# Patient Record
Sex: MALE | Race: BLACK OR AFRICAN AMERICAN | Employment: OTHER | URBAN - METROPOLITAN AREA
[De-identification: names, ages, dates, MRNs, and addresses within clinical notes are randomized per-mention and may not be internally consistent; named-entity substitution may affect disease eponyms.]

---

## 2019-06-08 ENCOUNTER — HOME HEALTH ADMISSION (OUTPATIENT)
Dept: HOME HEALTH SERVICES | Facility: HOME HEALTH | Age: 69
End: 2019-06-08

## 2019-06-08 ENCOUNTER — HOSPITAL ENCOUNTER (EMERGENCY)
Age: 69
Discharge: HOME | DRG: 629 | End: 2019-06-08
Attending: EMERGENCY MEDICINE
Payer: MEDICARE

## 2019-06-08 ENCOUNTER — APPOINTMENT (OUTPATIENT)
Dept: GENERAL RADIOLOGY | Age: 69
DRG: 629 | End: 2019-06-08
Payer: MEDICARE

## 2019-06-08 VITALS
TEMPERATURE: 98 F | HEART RATE: 66 BPM | OXYGEN SATURATION: 99 % | DIASTOLIC BLOOD PRESSURE: 72 MMHG | SYSTOLIC BLOOD PRESSURE: 152 MMHG | RESPIRATION RATE: 16 BRPM

## 2019-06-08 DIAGNOSIS — E11.621 DIABETIC ULCER OF RIGHT HEEL ASSOCIATED WITH TYPE 2 DIABETES MELLITUS, UNSPECIFIED ULCER STAGE (HCC): Primary | ICD-10-CM

## 2019-06-08 DIAGNOSIS — L97.419 DIABETIC ULCER OF RIGHT HEEL ASSOCIATED WITH TYPE 2 DIABETES MELLITUS, UNSPECIFIED ULCER STAGE (HCC): Primary | ICD-10-CM

## 2019-06-08 LAB
ANION GAP SERPL CALC-SCNC: 6 MMOL/L (ref 5–15)
BASOPHILS # BLD: 0 K/UL (ref 0–0.1)
BASOPHILS NFR BLD: 1 % (ref 0–1)
BUN SERPL-MCNC: 22 MG/DL (ref 6–20)
BUN/CREAT SERPL: 17 (ref 12–20)
CALCIUM SERPL-MCNC: 9 MG/DL (ref 8.5–10.1)
CHLORIDE SERPL-SCNC: 105 MMOL/L (ref 97–108)
CO2 SERPL-SCNC: 28 MMOL/L (ref 21–32)
CREAT SERPL-MCNC: 1.28 MG/DL (ref 0.7–1.3)
DIFFERENTIAL METHOD BLD: ABNORMAL
EOSINOPHIL # BLD: 0.3 K/UL (ref 0–0.4)
EOSINOPHIL NFR BLD: 5 % (ref 0–7)
ERYTHROCYTE [DISTWIDTH] IN BLOOD BY AUTOMATED COUNT: 12.7 % (ref 11.5–14.5)
GLUCOSE SERPL-MCNC: 133 MG/DL (ref 65–100)
HCT VFR BLD AUTO: 34 % (ref 36.6–50.3)
HGB BLD-MCNC: 10.7 G/DL (ref 12.1–17)
IMM GRANULOCYTES # BLD AUTO: 0 K/UL (ref 0–0.04)
IMM GRANULOCYTES NFR BLD AUTO: 0 % (ref 0–0.5)
LACTATE SERPL-SCNC: 0.8 MMOL/L (ref 0.4–2)
LYMPHOCYTES # BLD: 1 K/UL (ref 0.8–3.5)
LYMPHOCYTES NFR BLD: 17 % (ref 12–49)
MCH RBC QN AUTO: 28.5 PG (ref 26–34)
MCHC RBC AUTO-ENTMCNC: 31.5 G/DL (ref 30–36.5)
MCV RBC AUTO: 90.4 FL (ref 80–99)
MONOCYTES # BLD: 0.8 K/UL (ref 0–1)
MONOCYTES NFR BLD: 13 % (ref 5–13)
NEUTS SEG # BLD: 3.9 K/UL (ref 1.8–8)
NEUTS SEG NFR BLD: 64 % (ref 32–75)
NRBC # BLD: 0 K/UL (ref 0–0.01)
NRBC BLD-RTO: 0 PER 100 WBC
PLATELET # BLD AUTO: 354 K/UL (ref 150–400)
PMV BLD AUTO: 10 FL (ref 8.9–12.9)
POTASSIUM SERPL-SCNC: 4.3 MMOL/L (ref 3.5–5.1)
RBC # BLD AUTO: 3.76 M/UL (ref 4.1–5.7)
SODIUM SERPL-SCNC: 139 MMOL/L (ref 136–145)
WBC # BLD AUTO: 6.1 K/UL (ref 4.1–11.1)

## 2019-06-08 PROCEDURE — 87077 CULTURE AEROBIC IDENTIFY: CPT

## 2019-06-08 PROCEDURE — 99282 EMERGENCY DEPT VISIT SF MDM: CPT

## 2019-06-08 PROCEDURE — 80048 BASIC METABOLIC PNL TOTAL CA: CPT

## 2019-06-08 PROCEDURE — 83605 ASSAY OF LACTIC ACID: CPT

## 2019-06-08 PROCEDURE — 73630 X-RAY EXAM OF FOOT: CPT

## 2019-06-08 PROCEDURE — 85025 COMPLETE CBC W/AUTO DIFF WBC: CPT

## 2019-06-08 PROCEDURE — 87205 SMEAR GRAM STAIN: CPT

## 2019-06-08 PROCEDURE — 87147 CULTURE TYPE IMMUNOLOGIC: CPT

## 2019-06-08 PROCEDURE — 87186 SC STD MICRODIL/AGAR DIL: CPT

## 2019-06-08 PROCEDURE — 36415 COLL VENOUS BLD VENIPUNCTURE: CPT

## 2019-06-08 RX ORDER — METFORMIN HYDROCHLORIDE 1000 MG/1
1000 TABLET ORAL DAILY
COMMUNITY

## 2019-06-08 RX ORDER — SODIUM CHLORIDE 0.9 % (FLUSH) 0.9 %
5-40 SYRINGE (ML) INJECTION EVERY 8 HOURS
Status: DISCONTINUED | OUTPATIENT
Start: 2019-06-08 | End: 2019-06-08 | Stop reason: HOSPADM

## 2019-06-08 RX ORDER — SODIUM CHLORIDE 0.9 % (FLUSH) 0.9 %
5-40 SYRINGE (ML) INJECTION AS NEEDED
Status: DISCONTINUED | OUTPATIENT
Start: 2019-06-08 | End: 2019-06-08 | Stop reason: HOSPADM

## 2019-06-08 NOTE — ED NOTES
MD Trent Ingram have reviewed discharge instructions with the patient and caregiver. The patient and caregiver verbalized understanding. Pt will leave via w/c with caregiver.

## 2019-06-08 NOTE — ED NOTES
Bedside shift change report given to [address] (oncoming nurse) by Flor Chapin RN (offgoing nurse). Report included the following information SBAR, Kardex, ED Summary and MAR     Pt resting in bed, Ankit WEIR RN compelled wound care; wet to dry dressing.

## 2019-06-08 NOTE — DISCHARGE INSTRUCTIONS
Patient Education        Patient Education        Diabetic Foot Ulcer: Care Instructions  Your Care Instructions  Diabetes can damage the nerve endings and blood vessels in your feet. That means you are less likely to notice when your feet are injured. A small skin problem like a callus, blister, or cracked skin can turn into a larger sore, called a foot ulcer. Foot ulcers form most often on the pad (ball) of the foot or the bottom of the big toe. You can also get them on the top and bottom of each toe. Foot ulcers can get infected. If the infection is severe, then tissue in the foot can die. This is called gangrene. In that case, one or more of the toes, part or all of the foot, and sometimes part of the leg may have to be removed (amputated). Your doctor may have removed the dead tissue and cleaned the ulcer. Your foot wound may be wrapped in a protective bandage. It is very important to keep your weight off your injured foot. After a foot ulcer has formed, it will not heal as long as you keep putting weight on the area. Always get early treatment for foot problems. A minor irritation can lead to a major problem if it's not taken care of soon. Follow-up care is a key part of your treatment and safety. Be sure to make and go to all appointments, and call your doctor if you are having problems. It's also a good idea to know your test results and keep a list of the medicines you take. How can you care for yourself at home? · Follow your doctor's instructions about keeping pressure off the foot ulcer. You may need to use crutches or a wheelchair. Or you may wear a cast or a walking boot. · Follow your doctor's instructions on how to clean the ulcer and change the bandage. · If your doctor prescribed antibiotics, take them as directed. Do not stop taking them just because you feel better. You need to take the full course of antibiotics.   To prevent foot ulcers  · Keep your blood sugar close to normal by watching what and how much you eat. Track your blood sugar, take medicines if prescribed, and get regular exercise. · Do not smoke. Smoking affects blood flow and can make foot problems worse. If you need help quitting, talk to your doctor about stop-smoking programs and medicines. These can increase your chances of quitting for good. · Do not go barefoot. Protect your feet by wearing shoes that fit well. Choose shoes that are made of materials that are flexible and breathable, such as leather or cloth. · Inspect your feet daily for blisters, cuts, cracks, or sores. If you can't see well, use a mirror or have someone help you. · Have your doctor check your feet during each visit. If you have a foot problem, see your doctor. Do not try to treat your foot problem on your own. Home remedies or treatments that you can buy without a prescription (such as corn removers) can be harmful. When should you call for help? Call your doctor now or seek immediate medical care if:    · You have symptoms of infection, such as:  ? Increased pain, swelling, warmth, or redness. ? Red streaks leading from the area. ? Pus draining from the area. ? A fever.    Watch closely for changes in your health, and be sure to contact your doctor if:    · You have a new problem with your feet, such as:  ? A new sore or ulcer. ? A break in the skin that is not healing after several days. ? Bleeding corns or calluses. ? An ingrown toenail.     · You do not get better as expected. Where can you learn more? Go to http://tabitha-carla.info/. Enter T131 in the search box to learn more about \"Diabetic Foot Ulcer: Care Instructions. \"  Current as of: July 25, 2018  Content Version: 11.9  © 2335-1101 [employer]. Care instructions adapted under license by [employer] (which disclaims liability or warranty for this information).  If you have questions about a medical condition or this instruction, always ask your healthcare professional. [place] [address] any warranty or liability for your use of this information. Diabetes Foot Health: Care Instructions  Your Care Instructions    When you have diabetes, your feet need extra care and attention. Diabetes can damage the nerve endings and blood vessels in your feet, making you less likely to notice when your feet are injured. Diabetes also limits your body's ability to fight infection and get blood to areas that need it. If you get a minor foot injury, it could become an ulcer or a serious infection. With good foot care, you can prevent most of these problems. Caring for your feet can be quick and easy. Most of the care can be done when you are bathing or getting ready for bed. Follow-up care is a key part of your treatment and safety. Be sure to make and go to all appointments, and call your doctor if you are having problems. It's also a good idea to know your test results and keep a list of the medicines you take. How can you care for yourself at home? · Keep your blood sugar close to normal by watching what and how much you eat, monitoring blood sugar, taking medicines if prescribed, and getting regular exercise. · Do not smoke. Smoking affects blood flow and can make foot problems worse. If you need help quitting, talk to your doctor about stop-smoking programs and medicines. These can increase your chances of quitting for good. · Eat a diet that is low in fats. High fat intake can cause fat to build up in your blood vessels and decrease blood flow. · Inspect your feet daily for blisters, cuts, cracks, or sores. If you cannot see well, use a mirror or have someone help you. · Take care of your feet:  ? Wash your feet every day. Use warm (not hot) water. Check the water temperature with your wrists or other part of your body, not your feet. ? Dry your feet well. Pat them dry. Do not rub the skin on your feet too hard.  Dry well between your toes. If the skin on your feet stays moist, bacteria or a fungus can grow, which can lead to infection. ? Keep your skin soft. Use moisturizing skin cream to keep the skin on your feet soft and prevent calluses and cracks. But do not put the cream between your toes, and stop using any cream that causes a rash. ? Clean underneath your toenails carefully. Do not use a sharp object to clean underneath your toenails. Use the blunt end of a nail file or other rounded tool. ? Trim and file your toenails straight across to prevent ingrown toenails. Use a nail clipper, not scissors. Use an emery board to smooth the edges. · Change socks daily. Socks without seams are best, because seams often rub the feet. You can find socks for people with diabetes from specialty catalogs. · Look inside your shoes every day for things like gravel or torn linings, which could cause blisters or sores. · Buy shoes that fit well:  ? Look for shoes that have plenty of space around the toes. This helps prevent bunions and blisters. ? Try on shoes while wearing the kind of socks you will usually wear with the shoes. ? Avoid plastic shoes. They may rub your feet and cause blisters. Good shoes should be made of materials that are flexible and breathable, such as leather or cloth. ? Break in new shoes slowly by wearing them for no more than an hour a day for several days. Take extra time to check your feet for red areas, blisters, or other problems after you wear new shoes. · Do not go barefoot. Do not wear sandals, and do not wear shoes with very thin soles. Thin soles are easy to puncture. They also do not protect your feet from hot pavement or cold weather. · Have your doctor check your feet during each visit. If you have a foot problem, see your doctor. Do not try to treat an early foot problem at home. Home remedies or treatments that you can buy without a prescription (such as corn removers) can be harmful.   · Always get early treatment for foot problems. A minor irritation can lead to a major problem if not properly cared for early. When should you call for help? Call your doctor now or seek immediate medical care if:    · You have a foot sore, an ulcer or break in the skin that is not healing after 4 days, bleeding corns or calluses, or an ingrown toenail.     · You have blue or black areas, which can mean bruising or blood flow problems.     · You have peeling skin or tiny blisters between your toes or cracking or oozing of the skin.     · You have a fever for more than 24 hours and a foot sore.     · You have new numbness or tingling in your feet that does not go away after you move your feet or change positions.     · You have unexplained or unusual swelling of the foot or ankle.    Watch closely for changes in your health, and be sure to contact your doctor if:    · You cannot do proper foot care. Where can you learn more? Go to http://tabitha-carla.info/. Enter A739 in the search box to learn more about \"Diabetes Foot Health: Care Instructions. \"  Current as of: July 25, 2018  Content Version: 11.9  © 1383-3757 [employer]. Care instructions adapted under license by [employer] (which disclaims liability or warranty for this information). If you have questions about a medical condition or this instruction, always ask your healthcare professional. [place] [address] any warranty or liability for your use of this information.

## 2019-06-08 NOTE — ED TRIAGE NOTES
Assumed care of pt from [facility]. Pt is A&O x 4 and reports CC of wound to right foot. Pt reports he has had the wound for about a month and it has gotten larger. Pt denies pain. Pt reports he was seen at the [place] yesterday and is here for another opinion. Pt denies any other symptoms at this time. Pt placed on monitor x 2.  VSS

## 2019-06-08 NOTE — PROGRESS NOTES
Mr. Jasper Rodriguez is a 68 YOM, presented to ED for evaluation of right foot wound. CM consulted for assistance with transitional care plans/coordination. CM reviewed medical record, met with Patient and friend/emergency contact Ms. Teresa present at bedside; address/contact information verified on chart. Patient is a retired [employer] , 100% Service-Connected Disabled, receives monthly income from [employer] and skilled nursing. Patient is insured with Medicare A&B, [employer], and Veterans Benefits. Patient lives alone in his home in [place], reported continued independence with ADLs/IADLs, no DME in use at home. Patient has prior medical history of DM, has right foot diabetic ulcer, presented to [facility] ED for second opinion. Patient reported he was seen at [facility] this week, provided with prescription ointment and gauze for wound dressings, and scheduled to see Provider in [address] at [facility] on [facility] 6/10/19. As requested, CM provided additional education, resource information re: wound care services through [facility] and skilled home health care. Patient requested referral for [facility], [facility], referral sent to [facility] as requested. CM encouraged Patient to follow up in [address] as scheduled on 6/10/19, for additional orders, supplies, and treatment recommendations. Patient/friend verbalized understanding. Patient's PCP is Dr. Kirk Zimmerman at the [facility], [phone]. No additional questions/concerns reported at this time. CM provided emotional support, encouragement. CM advised Attending Physician of above assessment information. CM remains available to further assist with any additional transitional care needs as indicated.      Reason for Admission:  Evaluated/treated in ED for right foot diabetic ulcer                    RRAT Score:  5                   Plan for utilizing home health: Referral sent to [facility] for skilled nursing                       Current Advanced Directive/Advance Care Plan: Not on file    Likelihood of Readmission: Low                          Transition of Care Plan: Discharge home, referral sent to [facility]                    Care Management Interventions  PCP Verified by CM: Yes  Mode of Transport at Discharge:  Other (see comment)(family)  Transition of Care Consult (CM Consult): Home Health, Discharge Planning  Discharge Durable Medical Equipment: No  Physical Therapy Consult: No  Occupational Therapy Consult: No  Speech Therapy Consult: No  Current Support Network: Own Home  Confirm Follow Up Transport: Family  Plan discussed with Pt/Family/Caregiver: Yes  Freedom of Choice Offered: Yes  Discharge Location  Discharge Placement: Home with home health    Shirley Hall, [address] [facility]

## 2019-06-10 LAB
BACTERIA SPEC CULT: ABNORMAL
GRAM STN SPEC: ABNORMAL
GRAM STN SPEC: ABNORMAL
SERVICE CMNT-IMP: ABNORMAL

## 2019-06-10 NOTE — PROGRESS NOTES
Called patient. Verify demographics. He notes that he is taking Cipro. He states the wound is improving. He is concerned because home health has not contacted him about follow-up. Information provided to ED case management who will follow up on the home health concern.

## 2019-06-10 NOTE — ED PROVIDER NOTES
EMERGENCY DEPARTMENT HISTORY AND PHYSICAL EXAM      Date: 6/8/2019  Patient Name: Autumn Nails    History of Presenting Illness     Chief Complaint   Patient presents with    Foot Pain     pt stated he has an ulcer on his right foot that he thinks is infected . pt is diabetic        History Provided By: Patient    HPI: Autumn Nails, 68 y.o. male with history of DM presents to the ED for evaluation of a diabetic ulcer on his right heel. He is a [facility] patient and usually gets his care at the [address] [facility]. His partner noticed the ulceration today and became concerned that it is infected. Patient denies fevers or chills. Has had the foot ulceration for about one month. Sees podiatry the [address] [facility] but otherwise performs wound care himself. Has been able to walk and bear weight on the extremity. No swelling or calf pain in the leg. There are no other complaints, changes, or physical findings at this time. PCP: Other, MD Simón    No current facility-administered medications on file prior to encounter. Current Outpatient Medications on File Prior to Encounter   Medication Sig Dispense Refill    metFORMIN (GLUCOPHAGE) 1,000 mg tablet Take 1,000 mg by mouth daily. Past History     Past Medical History:  Past Medical History:   Diagnosis Date    Diabetes [facility])        Past Surgical History:  History reviewed. No pertinent surgical history. Family History:  History reviewed. No pertinent family history. Social History:  Social History     Tobacco Use    Smoking status: Never Smoker    Smokeless tobacco: Never Used   Substance Use Topics    Alcohol use: Yes     Comment: occ    Drug use: Never       Allergies:  No Known Allergies      Review of Systems   Review of Systems   Constitutional: Negative for appetite change, chills and fever. Respiratory: Negative for cough, chest tightness and shortness of breath. Cardiovascular: Negative for chest pain, palpitations and leg swelling.    Gastrointestinal: Negative for abdominal distention, abdominal pain, blood in stool, constipation, diarrhea, nausea and vomiting. Genitourinary: Negative for decreased urine volume, difficulty urinating, dysuria, flank pain, frequency and hematuria. Musculoskeletal: Negative for arthralgias, joint swelling, myalgias, neck pain and neck stiffness. Neurological: Negative for dizziness, weakness, light-headedness, numbness and headaches. Hematological: Negative for adenopathy. All other systems reviewed and are negative. Physical Exam   Physical Exam   Constitutional: He is oriented to person, place, and time. He appears well-developed and well-nourished. No distress. HENT:   Head: Atraumatic. Mouth/Throat: Oropharynx is clear and moist.   Eyes: Pupils are equal, round, and reactive to light. Conjunctivae and EOM are normal. No scleral icterus. Neck: Normal range of motion. Neck supple. No JVD present. Cardiovascular: Normal rate, regular rhythm, normal heart sounds and intact distal pulses. Pulmonary/Chest: Effort normal and breath sounds normal. He exhibits no tenderness. Abdominal: Soft. Bowel sounds are normal. He exhibits no distension. There is no tenderness. Musculoskeletal: Normal range of motion. He exhibits no edema or deformity. Diabetic ulcer on right heel. Patient's dressing removed. Ulcer appears clean with surrounding granulation tissue but without evidence of infection. No purulent drainage, no fluctuance, no erythema surrounding ulcer, not warm to touch. DP pulses intact. Neurological: He is alert and oriented to person, place, and time. No cranial nerve deficit. Skin: Skin is warm and dry. He is not diaphoretic. Nursing note and vitals reviewed.       Diagnostic Study Results     Labs -     Recent Results (from the past 50 hour(s))   CBC WITH AUTOMATED DIFF    Collection Time: 06/08/19  9:14 AM   Result Value Ref Range    WBC 6.1 4.1 - 11.1 K/uL    RBC 3.76 (L) 4.10 - 5.70 M/uL HGB 10.7 (L) 12.1 - 17.0 g/dL    HCT 34.0 (L) 36.6 - 50.3 %    MCV 90.4 80.0 - 99.0 FL    MCH 28.5 26.0 - 34.0 PG    MCHC 31.5 30.0 - 36.5 g/dL    RDW 12.7 11.5 - 14.5 %    PLATELET 949 065 - 325 K/uL    MPV 10.0 8.9 - 12.9 FL    NRBC 0.0 0  WBC    ABSOLUTE NRBC 0.00 0.00 - 0.01 K/uL    NEUTROPHILS 64 32 - 75 %    LYMPHOCYTES 17 12 - 49 %    MONOCYTES 13 5 - 13 %    EOSINOPHILS 5 0 - 7 %    BASOPHILS 1 0 - 1 %    IMMATURE GRANULOCYTES 0 0.0 - 0.5 %    ABS. NEUTROPHILS 3.9 1.8 - 8.0 K/UL    ABS. LYMPHOCYTES 1.0 0.8 - 3.5 K/UL    ABS. MONOCYTES 0.8 0.0 - 1.0 K/UL    ABS. EOSINOPHILS 0.3 0.0 - 0.4 K/UL    ABS. BASOPHILS 0.0 0.0 - 0.1 K/UL    ABS. IMM. GRANS. 0.0 0.00 - 0.04 K/UL    DF AUTOMATED     METABOLIC PANEL, BASIC    Collection Time: 06/08/19  9:14 AM   Result Value Ref Range    Sodium 139 136 - 145 mmol/L    Potassium 4.3 3.5 - 5.1 mmol/L    Chloride 105 97 - 108 mmol/L    CO2 28 21 - 32 mmol/L    Anion gap 6 5 - 15 mmol/L    Glucose 133 (H) 65 - 100 mg/dL    BUN 22 (H) 6 - 20 MG/DL    Creatinine 1.28 0.70 - 1.30 MG/DL    BUN/Creatinine ratio 17 12 - 20      GFR est AA >60 >60 ml/min/1.73m2    GFR est non-AA 55 (L) >60 ml/min/1.73m2    Calcium 9.0 8.5 - 10.1 MG/DL   CULTURE, WOUND W GRAM STAIN    Collection Time: 06/08/19  9:14 AM   Result Value Ref Range    Special Requests: NO SPECIAL REQUESTS      GRAM STAIN OCCASIONAL WBCS SEEN      GRAM STAIN 3+ GRAM NEGATIVE RODS      Culture result: MODERATE POSSIBLE ENTEROCOCCUS SPECIES (A)      Culture result: (A)       LIGHT STREPTOCOCCI, BETA HEMOLYTIC GROUP B . Penicillin and ampicillin are drugs of choice for treatment of beta-hemolytic streptococcal infections. Susceptibility testing of penicillins and beta-lactams approved by the FDA for treatment of beta-hemolytic streptococcal infections need not be performed routinely, because nonsusceptible isolates are extremely rare.  CLSI 2012    Culture result: FEW PROBABLE PSEUDOMONAS SPECIES (A)     LACTIC ACID Collection Time: 06/08/19  9:14 AM   Result Value Ref Range    Lactic acid 0.8 0.4 - 2.0 MMOL/L       Radiologic Studies -   XR FOOT RT MIN 3 V   Final Result   IMPRESSION:    1. Ulceration of the plantar aspect of the heel without evidence of   osteomyelitis. 2. Mild soft tissue swelling of the dorsum of the foot. 3. Degenerative changes. CT Results  (Last 48 hours)    None        CXR Results  (Last 48 hours)    None            Medical Decision Making   I am the first provider for this patient. I reviewed the vital signs, available nursing notes, past medical history, past surgical history, family history and social history. Vital Signs-Reviewed the patient's vital signs. Records Reviewed: Nursing Notes and Old Medical Records    Differential Diagnosis: abscess, cellulitis, gangrene, osteo    Provider Notes (Medical Decision Making):   Patient is a well-appearing 67 yo with a history of dm and pvd. He has had a diabetic ulcer on his right heal for about a month. He sees a podiatrist at the [place] but due to the slow healing process of the ulcer, patient's partner urged him to get evaluated in our ED  The exam does not suggest any evidence of infection, normal wbc, xrays do not show cellulitis. Provided reassurance to patient and partner. I also explained the nature and healing course of these type of ulcers and that a longer healing period is not unusual.  Discussed in detail prevention measures, importance of glucose control and good wound care. CC has assessed patient and we will set up home health to help him with wound care. He sees a podiatrist at the [place] and has a scheduled appt there on Monday. I have urged him to keep that appt. Pt has remained asymptomatic with normal vital signs and feels comfortable going home. I think this is reasonable as no findings today suggest a life-threatening condition. Pt was discharged and was provided written discharge instructions.   Additional verbal instructions and return precautions were given and discussed in detail. Pt was asked to return to the ED immediately for any new or concerning symptoms or if they worsen. Advised to follow-up with PMD or specialist as instructed. Pt was in agreement, endorsed understanding, and all questions were answered. ED Course:     Initial assessment performed. The patients presenting problems have been discussed, and they are in agreement with the care plan formulated and outlined with them. I have encouraged them to ask questions as they arise throughout their visit. ED Course as of Carlos 10 0059   Sat Jun 08, 2019   1058 Seen by Cc. Home health referral made. He has podiatry appt on Monday. [TZ]      ED Course User Index  [TZ] Anthony Anna MD         Disposition:  dc      Diagnosis     Clinical Impression:   1.  Diabetic ulcer of right heel associated with type 2 diabetes mellitus, unspecified ulcer stage ([facility] [address]

## 2019-06-11 ENCOUNTER — HOSPITAL ENCOUNTER (INPATIENT)
Age: 69
LOS: 10 days | Discharge: NURSING, REHAB OR LONG-TERM CARE | DRG: 629 | End: 2019-06-21
Attending: EMERGENCY MEDICINE | Admitting: INTERNAL MEDICINE
Payer: MEDICARE

## 2019-06-11 ENCOUNTER — APPOINTMENT (OUTPATIENT)
Dept: VASCULAR SURGERY | Age: 69
DRG: 629 | End: 2019-06-11
Payer: MEDICARE

## 2019-06-11 ENCOUNTER — APPOINTMENT (OUTPATIENT)
Dept: MRI IMAGING | Age: 69
DRG: 629 | End: 2019-06-11
Payer: MEDICARE

## 2019-06-11 DIAGNOSIS — M86.171 ACUTE OSTEOMYELITIS OF RIGHT FOOT (HCC): ICD-10-CM

## 2019-06-11 DIAGNOSIS — E11.621 DIABETIC ULCER OF RIGHT HEEL ASSOCIATED WITH TYPE 2 DIABETES MELLITUS, UNSPECIFIED ULCER STAGE (HCC): Primary | ICD-10-CM

## 2019-06-11 DIAGNOSIS — L97.419 DIABETIC ULCER OF RIGHT HEEL ASSOCIATED WITH TYPE 2 DIABETES MELLITUS, UNSPECIFIED ULCER STAGE (HCC): Primary | ICD-10-CM

## 2019-06-11 PROBLEM — E11.628 CONTROLLED TYPE 2 DIABETES MELLITUS WITH SKIN COMPLICATION, WITHOUT LONG-TERM CURRENT USE OF INSULIN (HCC): Chronic | Status: ACTIVE | Noted: 2019-06-11

## 2019-06-11 PROBLEM — L97.509 DIABETIC FOOT ULCER (HCC): Status: ACTIVE | Noted: 2019-06-11

## 2019-06-11 LAB
ANION GAP SERPL CALC-SCNC: 4 MMOL/L (ref 5–15)
BASOPHILS # BLD: 0 K/UL (ref 0–0.1)
BASOPHILS NFR BLD: 1 % (ref 0–1)
BUN SERPL-MCNC: 23 MG/DL (ref 6–20)
BUN/CREAT SERPL: 17 (ref 12–20)
CALCIUM SERPL-MCNC: 9.2 MG/DL (ref 8.5–10.1)
CHLORIDE SERPL-SCNC: 108 MMOL/L (ref 97–108)
CO2 SERPL-SCNC: 26 MMOL/L (ref 21–32)
CREAT SERPL-MCNC: 1.35 MG/DL (ref 0.7–1.3)
DIFFERENTIAL METHOD BLD: ABNORMAL
EOSINOPHIL # BLD: 0.3 K/UL (ref 0–0.4)
EOSINOPHIL NFR BLD: 4 % (ref 0–7)
ERYTHROCYTE [DISTWIDTH] IN BLOOD BY AUTOMATED COUNT: 12.8 % (ref 11.5–14.5)
GLUCOSE SERPL-MCNC: 84 MG/DL (ref 65–100)
HCT VFR BLD AUTO: 35.3 % (ref 36.6–50.3)
HGB BLD-MCNC: 11.2 G/DL (ref 12.1–17)
IMM GRANULOCYTES # BLD AUTO: 0 K/UL (ref 0–0.04)
IMM GRANULOCYTES NFR BLD AUTO: 0 % (ref 0–0.5)
LACTATE SERPL-SCNC: 0.7 MMOL/L (ref 0.4–2)
LEFT ABI: 0.8
LEFT ANTERIOR TIBIAL: 115 MMHG
LEFT ARM BP: 137 MMHG
LEFT POSTERIOR TIBIAL: 77 MMHG
LEFT TBI: 0.22
LEFT TOE PRESSURE: 32 MMHG
LYMPHOCYTES # BLD: 2 K/UL (ref 0.8–3.5)
LYMPHOCYTES NFR BLD: 28 % (ref 12–49)
MCH RBC QN AUTO: 28.7 PG (ref 26–34)
MCHC RBC AUTO-ENTMCNC: 31.7 G/DL (ref 30–36.5)
MCV RBC AUTO: 90.5 FL (ref 80–99)
MONOCYTES # BLD: 0.8 K/UL (ref 0–1)
MONOCYTES NFR BLD: 12 % (ref 5–13)
NEUTS SEG # BLD: 3.8 K/UL (ref 1.8–8)
NEUTS SEG NFR BLD: 55 % (ref 32–75)
NRBC # BLD: 0 K/UL (ref 0–0.01)
NRBC BLD-RTO: 0 PER 100 WBC
PLATELET # BLD AUTO: 369 K/UL (ref 150–400)
PMV BLD AUTO: 10.1 FL (ref 8.9–12.9)
POTASSIUM SERPL-SCNC: 4.2 MMOL/L (ref 3.5–5.1)
RBC # BLD AUTO: 3.9 M/UL (ref 4.1–5.7)
RIGHT ABI: 1.15
RIGHT ANTERIOR TIBIAL: 153 MMHG
RIGHT ARM BP: 143 MMHG
RIGHT POSTERIOR TIBIAL: 164 MMHG
RIGHT TBI: 0.52
RIGHT TOE PRESSURE: 74 MMHG
SODIUM SERPL-SCNC: 138 MMOL/L (ref 136–145)
WBC # BLD AUTO: 6.9 K/UL (ref 4.1–11.1)

## 2019-06-11 PROCEDURE — 96365 THER/PROPH/DIAG IV INF INIT: CPT

## 2019-06-11 PROCEDURE — 96376 TX/PRO/DX INJ SAME DRUG ADON: CPT

## 2019-06-11 PROCEDURE — 83605 ASSAY OF LACTIC ACID: CPT

## 2019-06-11 PROCEDURE — 36415 COLL VENOUS BLD VENIPUNCTURE: CPT

## 2019-06-11 PROCEDURE — 74011250637 HC RX REV CODE- 250/637: Performed by: EMERGENCY MEDICINE

## 2019-06-11 PROCEDURE — 93922 UPR/L XTREMITY ART 2 LEVELS: CPT

## 2019-06-11 PROCEDURE — 80048 BASIC METABOLIC PNL TOTAL CA: CPT

## 2019-06-11 PROCEDURE — 87040 BLOOD CULTURE FOR BACTERIA: CPT

## 2019-06-11 PROCEDURE — 73720 MRI LWR EXTREMITY W/O&W/DYE: CPT

## 2019-06-11 PROCEDURE — 99284 EMERGENCY DEPT VISIT MOD MDM: CPT

## 2019-06-11 PROCEDURE — 65270000015 HC RM PRIVATE ONCOLOGY

## 2019-06-11 PROCEDURE — 96375 TX/PRO/DX INJ NEW DRUG ADDON: CPT

## 2019-06-11 PROCEDURE — 85025 COMPLETE CBC W/AUTO DIFF WBC: CPT

## 2019-06-11 PROCEDURE — 93005 ELECTROCARDIOGRAM TRACING: CPT

## 2019-06-11 PROCEDURE — 74011000258 HC RX REV CODE- 258: Performed by: EMERGENCY MEDICINE

## 2019-06-11 PROCEDURE — 74011250636 HC RX REV CODE- 250/636: Performed by: INTERNAL MEDICINE

## 2019-06-11 PROCEDURE — 74011250636 HC RX REV CODE- 250/636: Performed by: EMERGENCY MEDICINE

## 2019-06-11 PROCEDURE — A9575 INJ GADOTERATE MEGLUMI 0.1ML: HCPCS | Performed by: EMERGENCY MEDICINE

## 2019-06-11 PROCEDURE — 99282 EMERGENCY DEPT VISIT SF MDM: CPT

## 2019-06-11 RX ORDER — GADOTERATE MEGLUMINE 376.9 MG/ML
15 INJECTION INTRAVENOUS
Status: COMPLETED | OUTPATIENT
Start: 2019-06-11 | End: 2019-06-11

## 2019-06-11 RX ORDER — SODIUM CHLORIDE 9 MG/ML
75 INJECTION, SOLUTION INTRAVENOUS CONTINUOUS
Status: DISCONTINUED | OUTPATIENT
Start: 2019-06-11 | End: 2019-06-13

## 2019-06-11 RX ORDER — INSULIN LISPRO 100 [IU]/ML
INJECTION, SOLUTION INTRAVENOUS; SUBCUTANEOUS EVERY 6 HOURS
Status: DISCONTINUED | OUTPATIENT
Start: 2019-06-12 | End: 2019-06-19

## 2019-06-11 RX ORDER — ACETAMINOPHEN 325 MG/1
650 TABLET ORAL
Status: DISCONTINUED | OUTPATIENT
Start: 2019-06-11 | End: 2019-06-12

## 2019-06-11 RX ORDER — ACETAMINOPHEN 500 MG
1000 TABLET ORAL ONCE
Status: COMPLETED | OUTPATIENT
Start: 2019-06-11 | End: 2019-06-11

## 2019-06-11 RX ORDER — SODIUM CHLORIDE 0.9 % (FLUSH) 0.9 %
5-40 SYRINGE (ML) INJECTION EVERY 8 HOURS
Status: DISCONTINUED | OUTPATIENT
Start: 2019-06-11 | End: 2019-06-21 | Stop reason: HOSPADM

## 2019-06-11 RX ORDER — ONDANSETRON 2 MG/ML
4 INJECTION INTRAMUSCULAR; INTRAVENOUS
Status: DISCONTINUED | OUTPATIENT
Start: 2019-06-11 | End: 2019-06-21 | Stop reason: HOSPADM

## 2019-06-11 RX ORDER — MAGNESIUM SULFATE 100 %
4 CRYSTALS MISCELLANEOUS AS NEEDED
Status: DISCONTINUED | OUTPATIENT
Start: 2019-06-11 | End: 2019-06-21 | Stop reason: HOSPADM

## 2019-06-11 RX ORDER — LORAZEPAM 2 MG/ML
0.5 INJECTION INTRAMUSCULAR
Status: COMPLETED | OUTPATIENT
Start: 2019-06-11 | End: 2019-06-11

## 2019-06-11 RX ORDER — ASPIRIN 81 MG/1
81 TABLET ORAL DAILY
Status: DISCONTINUED | OUTPATIENT
Start: 2019-06-12 | End: 2019-06-21 | Stop reason: HOSPADM

## 2019-06-11 RX ORDER — VANCOMYCIN/0.9 % SOD CHLORIDE 1.5G/250ML
1500 PLASTIC BAG, INJECTION (ML) INTRAVENOUS ONCE
Status: COMPLETED | OUTPATIENT
Start: 2019-06-11 | End: 2019-06-11

## 2019-06-11 RX ORDER — SODIUM CHLORIDE 0.9 % (FLUSH) 0.9 %
5-40 SYRINGE (ML) INJECTION AS NEEDED
Status: DISCONTINUED | OUTPATIENT
Start: 2019-06-11 | End: 2019-06-21 | Stop reason: HOSPADM

## 2019-06-11 RX ORDER — PEPPERMINT OIL
SPIRIT ORAL ONCE
Status: COMPLETED | OUTPATIENT
Start: 2019-06-11 | End: 2019-06-11

## 2019-06-11 RX ORDER — ENOXAPARIN SODIUM 100 MG/ML
40 INJECTION SUBCUTANEOUS EVERY 24 HOURS
Status: DISCONTINUED | OUTPATIENT
Start: 2019-06-11 | End: 2019-06-12

## 2019-06-11 RX ORDER — LORAZEPAM 2 MG/ML
1 INJECTION INTRAMUSCULAR
Status: COMPLETED | OUTPATIENT
Start: 2019-06-11 | End: 2019-06-11

## 2019-06-11 RX ADMIN — SODIUM CHLORIDE 75 ML/HR: 900 INJECTION, SOLUTION INTRAVENOUS at 23:17

## 2019-06-11 RX ADMIN — LORAZEPAM 0.5 MG: 2 INJECTION INTRAMUSCULAR; INTRAVENOUS at 18:38

## 2019-06-11 RX ADMIN — PIPERACILLIN SODIUM,TAZOBACTAM SODIUM 3.38 G: 3; .375 INJECTION, POWDER, FOR SOLUTION INTRAVENOUS at 20:21

## 2019-06-11 RX ADMIN — LORAZEPAM 0.5 MG: 2 INJECTION INTRAMUSCULAR; INTRAVENOUS at 19:05

## 2019-06-11 RX ADMIN — ENOXAPARIN SODIUM 40 MG: 40 INJECTION SUBCUTANEOUS at 23:27

## 2019-06-11 RX ADMIN — GADOTERATE MEGLUMINE 15 ML: 376.9 INJECTION INTRAVENOUS at 19:42

## 2019-06-11 RX ADMIN — SODIUM CHLORIDE 1000 ML: 900 INJECTION, SOLUTION INTRAVENOUS at 20:30

## 2019-06-11 RX ADMIN — Medication 5 ML: at 23:27

## 2019-06-11 RX ADMIN — Medication: at 20:21

## 2019-06-11 RX ADMIN — ACETAMINOPHEN 1000 MG: 500 TABLET ORAL at 20:20

## 2019-06-11 RX ADMIN — VANCOMYCIN HYDROCHLORIDE 1500 MG: 10 INJECTION, POWDER, LYOPHILIZED, FOR SOLUTION INTRAVENOUS at 21:08

## 2019-06-11 NOTE — ED NOTES
Delay in starting antibiotics so patient can go to MRI; Dr. Freddy Ni aware and okay with patient going to MRI first.

## 2019-06-11 NOTE — ED PROVIDER NOTES
EMERGENCY DEPARTMENT HISTORY AND PHYSICAL EXAM          Date: 6/11/2019  Patient Name: Maritza Velasquez    History of Presenting Illness     Chief Complaint   Patient presents with    Wound Check     Patient sent by foot doctor for chronic diabetic foot ulcer to right foot x 2 months; requests for MRI and DASIA's       History Provided By: Patient    HPI: Maritza Velasquez is a 68 y.o. male, pmhx DM, who presents ambulatory to the ED c/o foot wound x 2 months. Pt hs a rt sided foot wound and was seen Saturday and discharged home without antibiotics. He saw his podiatrist today who sent him for admission. Patient specifically denies any recent fevers, chills, nausea, vomiting, diarrhea, abd pain, CP, SOB, urinary sxs, changes in BM, or headache. His DM is a new dx as of March and his BS have been in the 110s this weekend. PCP: Simón Barr MD   Podiatry: [place]    Allergies: nkda  Social Hx: -tobacco, occasional EtOH,     There are no other complaints, changes, or physical findings at this time. Current Facility-Administered Medications   Medication Dose Route Frequency Provider Last Rate Last Dose    sodium chloride 0.9 % bolus infusion 1,000 mL  1,000 mL IntraVENous ONCE Shauna Gutierrez MD        piperacillin-tazobactam (ZOSYN) 3.375 g in 0.9% sodium chloride (MBP/ADV) 100 mL  3.375 g IntraVENous NOW Shauna Gutierrez MD        vancomycin (VANCOCIN) 1500 mg in  ml infusion  1,500 mg IntraVENous ONCE Shauna Gutierrez MD        LORazepam (ATIVAN) injection 1 mg  1 mg IntraVENous NOW Shauna Gutierrez MD        acetaminophen (TYLENOL) tablet 1,000 mg  1,000 mg Oral ONCE Shauna Gutierrez MD         Current Outpatient Medications   Medication Sig Dispense Refill    metFORMIN (GLUCOPHAGE) 1,000 mg tablet Take 1,000 mg by mouth daily.          Past History     Past Medical History:  Past Medical History:   Diagnosis Date    Diabetes [facility])        Past Surgical History:  No past surgical history on file. Family History:  No family history on file. Social History:  Social History     Tobacco Use    Smoking status: Never Smoker    Smokeless tobacco: Never Used   Substance Use Topics    Alcohol use: Yes     Comment: occ    Drug use: Never       Allergies:  No Known Allergies      Review of Systems   Review of Systems   Constitutional: Negative for activity change, appetite change, chills, fever and unexpected weight change. HENT: Negative for congestion. Eyes: Negative for pain and visual disturbance. Respiratory: Negative for cough and shortness of breath. Cardiovascular: Negative for chest pain. Gastrointestinal: Negative for abdominal pain, diarrhea, nausea and vomiting. Genitourinary: Negative for dysuria. Musculoskeletal: Negative for back pain. Skin: Positive for wound. Negative for rash. Neurological: Negative for headaches. Physical Exam   Physical Exam   Constitutional: He is oriented to person, place, and time. He appears well-developed and well-nourished. Well appearing elderly male currently in mild discomfort   HENT:   Head: Normocephalic and atraumatic. Mouth/Throat: Oropharynx is clear and moist.   Eyes: Pupils are equal, round, and reactive to light. Conjunctivae and EOM are normal. Right eye exhibits no discharge. Left eye exhibits no discharge. Neck: Normal range of motion. Neck supple. Cardiovascular: Normal rate, regular rhythm and normal heart sounds. No murmur heard. Pulmonary/Chest: Effort normal and breath sounds normal. No respiratory distress. He has no wheezes. He has no rales. Abdominal: Soft. Bowel sounds are normal. He exhibits no distension. There is no tenderness. Musculoskeletal: Normal range of motion. He exhibits no edema. Neurological: He is alert and oriented to person, place, and time. No cranial nerve deficit. He exhibits normal muscle tone. Skin: Skin is warm and dry. No rash noted. He is not diaphoretic. Rt lateral heel wound approximately 3x5cm with central necrosis, surrounding erythema and malodorous drainage   Nursing note and vitals reviewed. Diagnostic Study Results     Labs -     Recent Results (from the past 12 hour(s))   CBC WITH AUTOMATED DIFF    Collection Time: 06/11/19  3:22 PM   Result Value Ref Range    WBC 6.9 4.1 - 11.1 K/uL    RBC 3.90 (L) 4.10 - 5.70 M/uL    HGB 11.2 (L) 12.1 - 17.0 g/dL    HCT 35.3 (L) 36.6 - 50.3 %    MCV 90.5 80.0 - 99.0 FL    MCH 28.7 26.0 - 34.0 PG    MCHC 31.7 30.0 - 36.5 g/dL    RDW 12.8 11.5 - 14.5 %    PLATELET 418 840 - 636 K/uL    MPV 10.1 8.9 - 12.9 FL    NRBC 0.0 0  WBC    ABSOLUTE NRBC 0.00 0.00 - 0.01 K/uL    NEUTROPHILS 55 32 - 75 %    LYMPHOCYTES 28 12 - 49 %    MONOCYTES 12 5 - 13 %    EOSINOPHILS 4 0 - 7 %    BASOPHILS 1 0 - 1 %    IMMATURE GRANULOCYTES 0 0.0 - 0.5 %    ABS. NEUTROPHILS 3.8 1.8 - 8.0 K/UL    ABS. LYMPHOCYTES 2.0 0.8 - 3.5 K/UL    ABS. MONOCYTES 0.8 0.0 - 1.0 K/UL    ABS. EOSINOPHILS 0.3 0.0 - 0.4 K/UL    ABS. BASOPHILS 0.0 0.0 - 0.1 K/UL    ABS. IMM.  GRANS. 0.0 0.00 - 0.04 K/UL    DF AUTOMATED     METABOLIC PANEL, BASIC    Collection Time: 06/11/19  3:22 PM   Result Value Ref Range    Sodium 138 136 - 145 mmol/L    Potassium 4.2 3.5 - 5.1 mmol/L    Chloride 108 97 - 108 mmol/L    CO2 26 21 - 32 mmol/L    Anion gap 4 (L) 5 - 15 mmol/L    Glucose 84 65 - 100 mg/dL    BUN 23 (H) 6 - 20 MG/DL    Creatinine 1.35 (H) 0.70 - 1.30 MG/DL    BUN/Creatinine ratio 17 12 - 20      GFR est AA >60 >60 ml/min/1.73m2    GFR est non-AA 52 (L) >60 ml/min/1.73m2    Calcium 9.2 8.5 - 10.1 MG/DL   ANKLE BRACHIAL INDEX    Collection Time: 06/11/19  4:22 PM   Result Value Ref Range    Left arm  mmHg    Left posterior tibial 77 mmHg    Left anterior tibial 115 mmHg    Left toe pressure 32 mmHg    Right arm  mmHg    Right posterior tibial 164 mmHg    Right anterior tibial 153 mmHg    Right toe pressure 74 mmHg    Left DASIA 0.80     Right DASIA 1.15     Left TBI 0.22     Right TBI 0.52        Radiologic Studies -   MRI FOOT RT W WO CONT    (Results Pending)     CT Results  (Last 48 hours)    None        CXR Results  (Last 48 hours)    None            Medical Decision Making   I am the first provider for this patient. I reviewed the vital signs, available nursing notes, past medical history, past surgical history, family history and social history. Vital Signs-Reviewed the patient's vital signs. Patient Vitals for the past 12 hrs:   Temp Pulse Resp BP SpO2   06/11/19 1512 97.8 °F (36.6 °C) 80 18 116/75 99 %       Pulse Oximetry Analysis - 99% on RA    Cardiac Monitor:   Rate: 80bpm  Rhythm: Normal Sinus Rhythm      Records Reviewed: Nursing Notes, Old Medical Records, Previous Radiology Studies and Previous Laboratory Studies    Provider Notes (Medical Decision Making):   MDM: Elderly male with acute worsening of the diabetic wound. On exam today his family member notes it appears much worse as the odor, edema and central necrosis are all new. Broad spectrum abx have been initiated with code purple evaluation. ED Course:   Initial assessment performed. The patients presenting problems have been discussed, and they are in agreement with the care plan formulated and outlined with them. I have encouraged them to ask questions as they arise throughout their visit. CONSULT NOTE:   6:47 PM  Milena Espinosa MD spoke with Dr Phu Rodriguez,   Specialty: Podiatry  Discussed pt's hx, disposition, and available diagnostic and imaging results. Reviewed care plans. Consultant agrees with plans as outlined. He will come to the ER and evaluate the patient and continue with inpatient care. CONSULT NOTE:   6:48 PM  Milena Espinosa MD spoke with Dr Aniyah Mcknight,   Specialty: Hospitalist  Discussed pt's hx, disposition, and available diagnostic and imaging results. Reviewed care plans. Consultant agrees with plans as outlined.   They agreed to admission. Critical Care Time:   0      Diagnosis     Clinical Impression:   1. Diabetic ulcer of right heel associated with type 2 diabetes mellitus, unspecified ulcer stage ([address]        PLAN:  1. Admit IM hospitalist team  2. Podiatry consult      Please note, this dictation was completed with [employer], the computer voice recognition software. Quite often unanticipated grammatical, syntax, homophones, and other interpretive errors are inadvertently transcribed by the computer software. Please disregard these errors. Please excuse any errors that have escaped final proof reading.

## 2019-06-11 NOTE — PROGRESS NOTES

## 2019-06-11 NOTE — ED NOTES
Bedside and Verbal shift change report given to Whit Bliss RN (oncoming nurse) by Bo Dennis RN (offgoing nurse). Report included the following information SBAR, Kardex, ED Summary and Recent Results     Pt in MRI at this time. Will make contact when pt returns.  Antibiotics and meds will be given upon pt return from the room

## 2019-06-11 NOTE — ED NOTES
Gave patient additional 0.5mg of ativan after reporting feeling no difference after the first 0.5 as discussed with Dr. Eddi Joy

## 2019-06-12 ENCOUNTER — ANESTHESIA (OUTPATIENT)
Dept: SURGERY | Age: 69
DRG: 629 | End: 2019-06-12
Payer: MEDICARE

## 2019-06-12 ENCOUNTER — ANESTHESIA EVENT (OUTPATIENT)
Dept: SURGERY | Age: 69
DRG: 629 | End: 2019-06-12
Payer: MEDICARE

## 2019-06-12 LAB
ANION GAP SERPL CALC-SCNC: 4 MMOL/L (ref 5–15)
ATRIAL RATE: 77 BPM
BUN SERPL-MCNC: 18 MG/DL (ref 6–20)
BUN/CREAT SERPL: 17 (ref 12–20)
CALCIUM SERPL-MCNC: 8.3 MG/DL (ref 8.5–10.1)
CALCULATED P AXIS, ECG09: 63 DEGREES
CALCULATED R AXIS, ECG10: 20 DEGREES
CALCULATED T AXIS, ECG11: 23 DEGREES
CHLORIDE SERPL-SCNC: 110 MMOL/L (ref 97–108)
CO2 SERPL-SCNC: 26 MMOL/L (ref 21–32)
CREAT SERPL-MCNC: 1.06 MG/DL (ref 0.7–1.3)
DIAGNOSIS, 93000: NORMAL
ERYTHROCYTE [DISTWIDTH] IN BLOOD BY AUTOMATED COUNT: 12.6 % (ref 11.5–14.5)
EST. AVERAGE GLUCOSE BLD GHB EST-MCNC: 180 MG/DL
GLUCOSE BLD STRIP.AUTO-MCNC: 118 MG/DL (ref 65–100)
GLUCOSE BLD STRIP.AUTO-MCNC: 122 MG/DL (ref 65–100)
GLUCOSE BLD STRIP.AUTO-MCNC: 144 MG/DL (ref 65–100)
GLUCOSE BLD STRIP.AUTO-MCNC: 152 MG/DL (ref 65–100)
GLUCOSE BLD STRIP.AUTO-MCNC: 76 MG/DL (ref 65–100)
GLUCOSE SERPL-MCNC: 98 MG/DL (ref 65–100)
HBA1C MFR BLD: 7.9 % (ref 4.2–6.3)
HCT VFR BLD AUTO: 31 % (ref 36.6–50.3)
HGB BLD-MCNC: 9.9 G/DL (ref 12.1–17)
MCH RBC QN AUTO: 28.5 PG (ref 26–34)
MCHC RBC AUTO-ENTMCNC: 31.9 G/DL (ref 30–36.5)
MCV RBC AUTO: 89.3 FL (ref 80–99)
NRBC # BLD: 0 K/UL (ref 0–0.01)
NRBC BLD-RTO: 0 PER 100 WBC
P-R INTERVAL, ECG05: 214 MS
PLATELET # BLD AUTO: 282 K/UL (ref 150–400)
PMV BLD AUTO: 9.6 FL (ref 8.9–12.9)
POTASSIUM SERPL-SCNC: 3.7 MMOL/L (ref 3.5–5.1)
Q-T INTERVAL, ECG07: 388 MS
QRS DURATION, ECG06: 96 MS
QTC CALCULATION (BEZET), ECG08: 439 MS
RBC # BLD AUTO: 3.47 M/UL (ref 4.1–5.7)
SERVICE CMNT-IMP: ABNORMAL
SERVICE CMNT-IMP: NORMAL
SODIUM SERPL-SCNC: 140 MMOL/L (ref 136–145)
VENTRICULAR RATE, ECG03: 77 BPM
WBC # BLD AUTO: 4.6 K/UL (ref 4.1–11.1)

## 2019-06-12 PROCEDURE — 74011000272 HC RX REV CODE- 272: Performed by: PODIATRIST

## 2019-06-12 PROCEDURE — 83036 HEMOGLOBIN GLYCOSYLATED A1C: CPT

## 2019-06-12 PROCEDURE — 87185 SC STD ENZYME DETCJ PER NZM: CPT

## 2019-06-12 PROCEDURE — 74011000250 HC RX REV CODE- 250: Performed by: INTERNAL MEDICINE

## 2019-06-12 PROCEDURE — 74011250636 HC RX REV CODE- 250/636

## 2019-06-12 PROCEDURE — 74011250636 HC RX REV CODE- 250/636: Performed by: PODIATRIST

## 2019-06-12 PROCEDURE — 77030039825 HC MSK NSL PAP SUPERNO2VA VYRM -B: Performed by: NURSE ANESTHETIST, CERTIFIED REGISTERED

## 2019-06-12 PROCEDURE — 74011250636 HC RX REV CODE- 250/636: Performed by: INTERNAL MEDICINE

## 2019-06-12 PROCEDURE — 77030002996 HC SUT SLK J&J -A: Performed by: PODIATRIST

## 2019-06-12 PROCEDURE — 77030011640 HC PAD GRND REM COVD -A: Performed by: PODIATRIST

## 2019-06-12 PROCEDURE — 77030020143 HC AIRWY LARYN INTUB CGAS -A: Performed by: NURSE ANESTHETIST, CERTIFIED REGISTERED

## 2019-06-12 PROCEDURE — 77030000032 HC CUF TRNQT ZIMM -B: Performed by: PODIATRIST

## 2019-06-12 PROCEDURE — 65270000015 HC RM PRIVATE ONCOLOGY

## 2019-06-12 PROCEDURE — 88311 DECALCIFY TISSUE: CPT

## 2019-06-12 PROCEDURE — 0QBL0ZZ EXCISION OF RIGHT TARSAL, OPEN APPROACH: ICD-10-PCS | Performed by: PODIATRIST

## 2019-06-12 PROCEDURE — 85027 COMPLETE CBC AUTOMATED: CPT

## 2019-06-12 PROCEDURE — 76210000006 HC OR PH I REC 0.5 TO 1 HR: Performed by: PODIATRIST

## 2019-06-12 PROCEDURE — 74011000250 HC RX REV CODE- 250

## 2019-06-12 PROCEDURE — 76060000035 HC ANESTHESIA 2 TO 2.5 HR: Performed by: PODIATRIST

## 2019-06-12 PROCEDURE — 77030013708 HC HNDPC SUC IRR PULS STRY –B: Performed by: PODIATRIST

## 2019-06-12 PROCEDURE — 77030014007 HC SPNG HEMSTAT J&J -B: Performed by: PODIATRIST

## 2019-06-12 PROCEDURE — 87075 CULTR BACTERIA EXCEPT BLOOD: CPT

## 2019-06-12 PROCEDURE — 77030019908 HC STETH ESOPH SIMS -A: Performed by: NURSE ANESTHETIST, CERTIFIED REGISTERED

## 2019-06-12 PROCEDURE — 36415 COLL VENOUS BLD VENIPUNCTURE: CPT

## 2019-06-12 PROCEDURE — 87147 CULTURE TYPE IMMUNOLOGIC: CPT

## 2019-06-12 PROCEDURE — 88307 TISSUE EXAM BY PATHOLOGIST: CPT

## 2019-06-12 PROCEDURE — 77030032490 HC SLV COMPR SCD KNE COVD -B: Performed by: PODIATRIST

## 2019-06-12 PROCEDURE — 77030018836 HC SOL IRR NACL ICUM -A: Performed by: PODIATRIST

## 2019-06-12 PROCEDURE — 77030020782 HC GWN BAIR PAWS FLX 3M -B

## 2019-06-12 PROCEDURE — 80048 BASIC METABOLIC PNL TOTAL CA: CPT

## 2019-06-12 PROCEDURE — 88305 TISSUE EXAM BY PATHOLOGIST: CPT

## 2019-06-12 PROCEDURE — 74011250636 HC RX REV CODE- 250/636: Performed by: ANESTHESIOLOGY

## 2019-06-12 PROCEDURE — 87102 FUNGUS ISOLATION CULTURE: CPT

## 2019-06-12 PROCEDURE — 74011000258 HC RX REV CODE- 258

## 2019-06-12 PROCEDURE — 74011000258 HC RX REV CODE- 258: Performed by: INTERNAL MEDICINE

## 2019-06-12 PROCEDURE — 82962 GLUCOSE BLOOD TEST: CPT

## 2019-06-12 PROCEDURE — 87176 TISSUE HOMOGENIZATION CULTR: CPT

## 2019-06-12 PROCEDURE — 76010000131 HC OR TIME 2 TO 2.5 HR: Performed by: PODIATRIST

## 2019-06-12 PROCEDURE — 77030006788 HC BLD SAW OSC STRY -B: Performed by: PODIATRIST

## 2019-06-12 PROCEDURE — 87077 CULTURE AEROBIC IDENTIFY: CPT

## 2019-06-12 PROCEDURE — 87116 MYCOBACTERIA CULTURE: CPT

## 2019-06-12 PROCEDURE — 87186 SC STD MICRODIL/AGAR DIL: CPT

## 2019-06-12 PROCEDURE — 74011000250 HC RX REV CODE- 250: Performed by: PODIATRIST

## 2019-06-12 PROCEDURE — 87205 SMEAR GRAM STAIN: CPT

## 2019-06-12 RX ORDER — BUPIVACAINE HYDROCHLORIDE 5 MG/ML
INJECTION, SOLUTION EPIDURAL; INTRACAUDAL AS NEEDED
Status: DISCONTINUED | OUTPATIENT
Start: 2019-06-12 | End: 2019-06-12 | Stop reason: HOSPADM

## 2019-06-12 RX ORDER — FENTANYL CITRATE 50 UG/ML
INJECTION, SOLUTION INTRAMUSCULAR; INTRAVENOUS AS NEEDED
Status: DISCONTINUED | OUTPATIENT
Start: 2019-06-12 | End: 2019-06-12 | Stop reason: HOSPADM

## 2019-06-12 RX ORDER — OXYCODONE AND ACETAMINOPHEN 7.5; 325 MG/1; MG/1
1 TABLET ORAL
Status: DISCONTINUED | OUTPATIENT
Start: 2019-06-12 | End: 2019-06-21 | Stop reason: HOSPADM

## 2019-06-12 RX ORDER — MIDAZOLAM HYDROCHLORIDE 1 MG/ML
INJECTION, SOLUTION INTRAMUSCULAR; INTRAVENOUS AS NEEDED
Status: DISCONTINUED | OUTPATIENT
Start: 2019-06-12 | End: 2019-06-12 | Stop reason: HOSPADM

## 2019-06-12 RX ORDER — SODIUM CHLORIDE, SODIUM LACTATE, POTASSIUM CHLORIDE, CALCIUM CHLORIDE 600; 310; 30; 20 MG/100ML; MG/100ML; MG/100ML; MG/100ML
INJECTION, SOLUTION INTRAVENOUS
Status: DISCONTINUED | OUTPATIENT
Start: 2019-06-12 | End: 2019-06-12 | Stop reason: HOSPADM

## 2019-06-12 RX ORDER — MORPHINE SULFATE 2 MG/ML
2 INJECTION, SOLUTION INTRAMUSCULAR; INTRAVENOUS
Status: DISCONTINUED | OUTPATIENT
Start: 2019-06-12 | End: 2019-06-21 | Stop reason: HOSPADM

## 2019-06-12 RX ORDER — PROPOFOL 10 MG/ML
INJECTION, EMULSION INTRAVENOUS
Status: DISCONTINUED | OUTPATIENT
Start: 2019-06-12 | End: 2019-06-12 | Stop reason: HOSPADM

## 2019-06-12 RX ORDER — SODIUM CHLORIDE 0.9 % (FLUSH) 0.9 %
5-40 SYRINGE (ML) INJECTION AS NEEDED
Status: DISCONTINUED | OUTPATIENT
Start: 2019-06-12 | End: 2019-06-12 | Stop reason: HOSPADM

## 2019-06-12 RX ORDER — PROPOFOL 10 MG/ML
INJECTION, EMULSION INTRAVENOUS AS NEEDED
Status: DISCONTINUED | OUTPATIENT
Start: 2019-06-12 | End: 2019-06-12 | Stop reason: HOSPADM

## 2019-06-12 RX ORDER — LIDOCAINE HYDROCHLORIDE 10 MG/ML
0.1 INJECTION, SOLUTION EPIDURAL; INFILTRATION; INTRACAUDAL; PERINEURAL AS NEEDED
Status: DISCONTINUED | OUTPATIENT
Start: 2019-06-12 | End: 2019-06-12 | Stop reason: HOSPADM

## 2019-06-12 RX ORDER — PHENYLEPHRINE HCL IN 0.9% NACL 0.4MG/10ML
SYRINGE (ML) INTRAVENOUS AS NEEDED
Status: DISCONTINUED | OUTPATIENT
Start: 2019-06-12 | End: 2019-06-12 | Stop reason: HOSPADM

## 2019-06-12 RX ORDER — KETAMINE HYDROCHLORIDE 10 MG/ML
INJECTION, SOLUTION INTRAMUSCULAR; INTRAVENOUS AS NEEDED
Status: DISCONTINUED | OUTPATIENT
Start: 2019-06-12 | End: 2019-06-12 | Stop reason: HOSPADM

## 2019-06-12 RX ORDER — GLYCOPYRROLATE 0.2 MG/ML
INJECTION INTRAMUSCULAR; INTRAVENOUS AS NEEDED
Status: DISCONTINUED | OUTPATIENT
Start: 2019-06-12 | End: 2019-06-12 | Stop reason: HOSPADM

## 2019-06-12 RX ORDER — SODIUM CHLORIDE 0.9 % (FLUSH) 0.9 %
5-40 SYRINGE (ML) INJECTION EVERY 8 HOURS
Status: DISCONTINUED | OUTPATIENT
Start: 2019-06-12 | End: 2019-06-12 | Stop reason: HOSPADM

## 2019-06-12 RX ORDER — DEXAMETHASONE SODIUM PHOSPHATE 4 MG/ML
INJECTION, SOLUTION INTRA-ARTICULAR; INTRALESIONAL; INTRAMUSCULAR; INTRAVENOUS; SOFT TISSUE AS NEEDED
Status: DISCONTINUED | OUTPATIENT
Start: 2019-06-12 | End: 2019-06-12 | Stop reason: HOSPADM

## 2019-06-12 RX ORDER — EPHEDRINE SULFATE 50 MG/ML
INJECTION, SOLUTION INTRAVENOUS AS NEEDED
Status: DISCONTINUED | OUTPATIENT
Start: 2019-06-12 | End: 2019-06-12 | Stop reason: HOSPADM

## 2019-06-12 RX ORDER — DIPHENHYDRAMINE HYDROCHLORIDE 50 MG/ML
12.5 INJECTION, SOLUTION INTRAMUSCULAR; INTRAVENOUS AS NEEDED
Status: DISCONTINUED | OUTPATIENT
Start: 2019-06-12 | End: 2019-06-12 | Stop reason: HOSPADM

## 2019-06-12 RX ORDER — ONDANSETRON 2 MG/ML
INJECTION INTRAMUSCULAR; INTRAVENOUS AS NEEDED
Status: DISCONTINUED | OUTPATIENT
Start: 2019-06-12 | End: 2019-06-12 | Stop reason: HOSPADM

## 2019-06-12 RX ORDER — HYDROMORPHONE HYDROCHLORIDE 1 MG/ML
0.2 INJECTION, SOLUTION INTRAMUSCULAR; INTRAVENOUS; SUBCUTANEOUS
Status: DISCONTINUED | OUTPATIENT
Start: 2019-06-12 | End: 2019-06-12 | Stop reason: HOSPADM

## 2019-06-12 RX ORDER — LIDOCAINE HYDROCHLORIDE 20 MG/ML
INJECTION, SOLUTION EPIDURAL; INFILTRATION; INTRACAUDAL; PERINEURAL AS NEEDED
Status: DISCONTINUED | OUTPATIENT
Start: 2019-06-12 | End: 2019-06-12 | Stop reason: HOSPADM

## 2019-06-12 RX ORDER — FENTANYL CITRATE 50 UG/ML
25 INJECTION, SOLUTION INTRAMUSCULAR; INTRAVENOUS
Status: DISCONTINUED | OUTPATIENT
Start: 2019-06-12 | End: 2019-06-12 | Stop reason: HOSPADM

## 2019-06-12 RX ORDER — DEXTROSE 50 % IN WATER (D50W) INTRAVENOUS SYRINGE
Status: COMPLETED
Start: 2019-06-12 | End: 2019-06-12

## 2019-06-12 RX ORDER — SODIUM CHLORIDE, SODIUM LACTATE, POTASSIUM CHLORIDE, CALCIUM CHLORIDE 600; 310; 30; 20 MG/100ML; MG/100ML; MG/100ML; MG/100ML
25 INJECTION, SOLUTION INTRAVENOUS CONTINUOUS
Status: DISCONTINUED | OUTPATIENT
Start: 2019-06-12 | End: 2019-06-12 | Stop reason: HOSPADM

## 2019-06-12 RX ADMIN — EPHEDRINE SULFATE 10 MG: 50 INJECTION, SOLUTION INTRAVENOUS at 19:04

## 2019-06-12 RX ADMIN — EPHEDRINE SULFATE 20 MG: 50 INJECTION, SOLUTION INTRAVENOUS at 19:09

## 2019-06-12 RX ADMIN — Medication 10 ML: at 22:36

## 2019-06-12 RX ADMIN — Medication 80 MCG: at 20:13

## 2019-06-12 RX ADMIN — DEXTROSE MONOHYDRATE 25 G: 500 INJECTION PARENTERAL at 17:51

## 2019-06-12 RX ADMIN — MIDAZOLAM HYDROCHLORIDE 1 MG: 1 INJECTION, SOLUTION INTRAMUSCULAR; INTRAVENOUS at 18:52

## 2019-06-12 RX ADMIN — Medication 80 MCG: at 19:39

## 2019-06-12 RX ADMIN — VANCOMYCIN HYDROCHLORIDE 750 MG: 750 INJECTION, POWDER, LYOPHILIZED, FOR SOLUTION INTRAVENOUS at 22:35

## 2019-06-12 RX ADMIN — PIPERACILLIN SODIUM,TAZOBACTAM SODIUM 3.38 G: 3; .375 INJECTION, POWDER, FOR SOLUTION INTRAVENOUS at 19:15

## 2019-06-12 RX ADMIN — HYDROMORPHONE HYDROCHLORIDE 0.2 MG: 1 INJECTION, SOLUTION INTRAMUSCULAR; INTRAVENOUS; SUBCUTANEOUS at 21:30

## 2019-06-12 RX ADMIN — DEXAMETHASONE SODIUM PHOSPHATE 4 MG: 4 INJECTION, SOLUTION INTRA-ARTICULAR; INTRALESIONAL; INTRAMUSCULAR; INTRAVENOUS; SOFT TISSUE at 19:27

## 2019-06-12 RX ADMIN — ONDANSETRON 4 MG: 2 INJECTION INTRAMUSCULAR; INTRAVENOUS at 19:27

## 2019-06-12 RX ADMIN — PROPOFOL 50 MG: 10 INJECTION, EMULSION INTRAVENOUS at 19:00

## 2019-06-12 RX ADMIN — Medication 5 ML: at 05:17

## 2019-06-12 RX ADMIN — Medication 80 MCG: at 19:31

## 2019-06-12 RX ADMIN — GLYCOPYRROLATE 0.2 MG: 0.2 INJECTION INTRAMUSCULAR; INTRAVENOUS at 19:16

## 2019-06-12 RX ADMIN — MIDAZOLAM HYDROCHLORIDE 1 MG: 1 INJECTION, SOLUTION INTRAMUSCULAR; INTRAVENOUS at 18:49

## 2019-06-12 RX ADMIN — LIDOCAINE HYDROCHLORIDE 40 MG: 20 INJECTION, SOLUTION EPIDURAL; INFILTRATION; INTRACAUDAL; PERINEURAL at 18:54

## 2019-06-12 RX ADMIN — PIPERACILLIN SODIUM,TAZOBACTAM SODIUM 3.38 G: 3; .375 INJECTION, POWDER, FOR SOLUTION INTRAVENOUS at 05:17

## 2019-06-12 RX ADMIN — PROPOFOL 50 MG: 10 INJECTION, EMULSION INTRAVENOUS at 18:54

## 2019-06-12 RX ADMIN — SODIUM CHLORIDE, SODIUM LACTATE, POTASSIUM CHLORIDE, CALCIUM CHLORIDE: 600; 310; 30; 20 INJECTION, SOLUTION INTRAVENOUS at 20:01

## 2019-06-12 RX ADMIN — PIPERACILLIN SODIUM,TAZOBACTAM SODIUM 3.38 G: 3; .375 INJECTION, POWDER, FOR SOLUTION INTRAVENOUS at 14:26

## 2019-06-12 RX ADMIN — SODIUM CHLORIDE, SODIUM LACTATE, POTASSIUM CHLORIDE, CALCIUM CHLORIDE: 600; 310; 30; 20 INJECTION, SOLUTION INTRAVENOUS at 18:49

## 2019-06-12 RX ADMIN — Medication 120 MCG: at 19:16

## 2019-06-12 RX ADMIN — Medication 10 ML: at 13:18

## 2019-06-12 RX ADMIN — Medication: at 08:44

## 2019-06-12 RX ADMIN — FENTANYL CITRATE 25 MCG: 50 INJECTION, SOLUTION INTRAMUSCULAR; INTRAVENOUS at 19:19

## 2019-06-12 RX ADMIN — KETAMINE HYDROCHLORIDE 20 MG: 10 INJECTION, SOLUTION INTRAMUSCULAR; INTRAVENOUS at 18:58

## 2019-06-12 RX ADMIN — FENTANYL CITRATE 25 MCG: 50 INJECTION, SOLUTION INTRAMUSCULAR; INTRAVENOUS at 18:54

## 2019-06-12 RX ADMIN — VANCOMYCIN HYDROCHLORIDE 750 MG: 750 INJECTION, POWDER, LYOPHILIZED, FOR SOLUTION INTRAVENOUS at 08:45

## 2019-06-12 RX ADMIN — FENTANYL CITRATE 25 MCG: 50 INJECTION, SOLUTION INTRAMUSCULAR; INTRAVENOUS at 19:47

## 2019-06-12 RX ADMIN — PROPOFOL 100 MCG/KG/MIN: 10 INJECTION, EMULSION INTRAVENOUS at 18:54

## 2019-06-12 RX ADMIN — FENTANYL CITRATE 25 MCG: 50 INJECTION, SOLUTION INTRAMUSCULAR; INTRAVENOUS at 19:33

## 2019-06-12 RX ADMIN — EPHEDRINE SULFATE 20 MG: 50 INJECTION, SOLUTION INTRAVENOUS at 19:13

## 2019-06-12 NOTE — PROGRESS NOTES
Podiatry    Spoke with pt about salvage vs. BKA. He understands that there is a significant risk of failure after multiple surgeries and months of wound care, but would like to attempt salvage of the right foot. I will put him on the OR schedule for the end of the day. Pt may have breakfast.    Chart reviewed.

## 2019-06-12 NOTE — PROGRESS NOTES
Pharmacy Automatic Renal Dosing Protocol - Antimicrobials    Indication for Antimicrobials: SSTI, bone and joint infection. Diabetic foot infection. Current Regimen of Each Antimicrobial:  Vancomycin 1500 mg IV LOAD + 750 mg IV every 12 hours (start , day )  Zosyn 3.375 g IV every 8 hours (start , day )    Previous Antimicrobial Therapy:      Goal Level: VANCOMYCIN TROUGH GOAL RANGE    Vancomycin Trough: 15 - 20 mcg/mL    Date Dose & Interval Measured (mcg/mL) Extrapolated (mcg/mL)                       Date & time of next level: after second maintenance dose to be given     Significant Cultures:       Radiology / Imaging results: (X-ray, CT scan or MRI):    MRI foot - pending    Paralysis, amputations, malnutrition:     Labs:  Recent Labs     19  1522   CREA 1.35*   BUN 23*   WBC 6.9     Temp (24hrs), Av.6 °F (36.4 °C), Min:97.3 °F (36.3 °C), Max:97.8 °F (36.6 °C)    Creatinine Clearance (mL/min) or Dialysis: 55.1 ml/min    Impression/Plan:   Ordered vancomycin 750 mg IV every 12 hours for an anticipated trough of 16 mcg/ml  Continue zosyn  Antimicrobial stop date 5 days - vancomycin and zosyn  BMP ordered     Pharmacy will follow daily and adjust medications as appropriate for renal function and/or serum levels. Thank you,  Raisa Campo, PHARMD    Recommended duration of therapy  http://[facility]/Pharmacy/Clinical%20Companion/Duration%20of%20ABX%20therapy. docx    Renal Dosing  [email or web address]/[place]/[facility]/Pharmacy/Clinical%20Companion/Renal%[id number]. pdf

## 2019-06-12 NOTE — H&P
Hospitalist Admission Note    NAME: Milly Ku   :  1950   MRN:  [id number]     Date/Time:  2019 8:49 PM    Patient PCP: Simón Barr MD  ______________________________________________________________________  Given the patient's current clinical presentation, I have a high level of concern for decompensation if discharged from the emergency department. Complex decision making was performed, which includes reviewing the patient's available past medical records, laboratory results, and x-ray films. My assessment of this patient's clinical condition and my plan of care is as follows. Assessment / Plan:  Acute Osteomyelitis of R Foot POA  R foot Cellulitis POA  R Diabetic Foot Ulcer- non healing POA  Newly diagnosed DM type 2 POA- in 2019  PAD s/p R leg stent at [place] per pt  Recent Wound Cx- growing Pseudomonas & enterococcal sp  MRI R foot noted- Acute OM of the calcaneum    Admit to medical/surgical bed  IVF  IV abx- vanc + zosyn  Follow Blood Cx  IP Podiatry consulted- NPO p MN for OR in AM- likely needs BKA? IP ID consulted by podiatry  FS Q AC & HS when eating  SSI lispro here  Holding home metformin  Cont home ASA daily  Check HbA1c in AM        Code Status: Full  Surrogate Decision Maker: friend Pris Varney Merlin # [phone]    DVT Prophylaxis: Sq lovenox  GI Prophylaxis: not indicated    Baseline: Pt lives with a friend, ambulatory      Subjective:   CHIEF COMPLAINT: Foul smelling  Non healing R foot ulcer x 2 month    HISTORY OF PRESENT ILLNESS:     Bang Nolasco is a 68 y.o.  male who presents with above complains from home ambulatory sent from [employer] today. [facility] for a non healing R foot Diabetic ulcer - sent to ER for further management.   Pt was seen recently in ER on  & was DC home with OP follow up without any antibiotics per pt-- pt was found to be growing pseudomonas & enterococcal sp on wound Cx from 6/8/2019 & pt claims wound started smelling bad recently. Pt was found to be non toxic in ER with unremarkable blood work but MRI R foot +ve for acute Osteomyelitis. We were asked to admit for work up and evaluation of the above problems. Past Medical History:   Diagnosis Date    Diabetes ([address]         No past surgical history on file. Social History     Tobacco Use    Smoking status: Never Smoker    Smokeless tobacco: Never Used   Substance Use Topics    Alcohol use: Yes     Comment: occ      Family History  DM runs in family per pt    No Known Allergies     Prior to Admission medications    Medication Sig Start Date End Date Taking? Authorizing Provider   metFORMIN (GLUCOPHAGE) 1,000 mg tablet Take 1,000 mg by mouth daily.     Other, MD Simón       REVIEW OF SYSTEMS:           Total of 12 systems reviewed as follows:       POSITIVE= underlined text  Negative = text not underlined  General:  fever, chills, sweats, generalized weakness, weight loss/gain,      loss of appetite   Eyes:    blurred vision, eye pain, loss of vision, double vision  ENT:    rhinorrhea, pharyngitis   Respiratory:   cough, sputum production, SOB, BLACK, wheezing, pleuritic pain   Cardiology:   chest pain, palpitations, orthopnea, PND, edema, syncope   Gastrointestinal:  abdominal pain , N/V, diarrhea, dysphagia, constipation, bleeding   Genitourinary:  frequency, urgency, dysuria, hematuria, incontinence   Muskuloskeletal :  arthralgia, myalgia, back pain  Hematology:  easy bruising, nose or gum bleeding, lymphadenopathy   Dermatological: rash, ulceration (R foot) pruritis, color change / jaundice  Endocrine:   hot flashes or polydipsia   Neurological:  headache, dizziness, confusion, focal weakness, paresthesia,     Speech difficulties, memory loss, gait difficulty  Psychological: Feelings of anxiety, depression, agitation    Objective:   VITALS:    Visit Vitals  /84 (BP 1 Location: Right arm, BP Patient Position: At rest)   Pulse 72   Temp 97.3 °F (36.3 °C)   Resp 19   Ht 6' (1.829 m)   Wt 80.3 kg (177 lb 0.5 oz)   SpO2 99%   BMI 24.01 kg/m²       PHYSICAL EXAM:    General:    Alert, cooperative, no distress, appears stated age. HEENT: Atraumatic, anicteric sclerae, pink conjunctivae     No oral ulcers, mucosa moist, throat clear, dentition fair  Neck:  Supple, symmetrical,  thyroid: non tender  Lungs:   Clear to auscultation bilaterally. No Wheezing or Rhonchi. No rales. Chest wall:  No tenderness  No Accessory muscle use. Heart:   Regular  rhythm,  No  murmur   No edema  Abdomen:   Soft, non-tender. Not distended. Bowel sounds normal  Extremities: No cyanosis. No clubbing, R Heel Ulcer with Necrotic tissue & surrounding erythema with Foul smell +     Skin turgor normal, Capillary refill normal, Radial dial pulse 2+  Skin:     Not pale. Not Jaundiced  No rashes   Psych:  Good insight. Not depressed. Not anxious or agitated. Neurologic: EOMs intact. No facial asymmetry. No aphasia or slurred speech. Symmetrical strength, Sensation grossly intact.  Alert and oriented X 4.     _______________________________________________________________________  Care Plan discussed with:    Comments   Patient x    Family      RN x    Care Manager                    Consultant:  pool GIRON MD [place]   _______________________________________________________________________  Expected  Disposition:   Home with Family    HH/PT/OT/RN ?   SNF/LTC    MAGDAL[place] ?   ________________________________________________________________________  TOTAL TIME:  64 Minutes    Critical Care Provided     Minutes non procedure based      Comments    x Reviewed previous records   >50% of visit spent in counseling and coordination of care x Discussion with patient and questions answered       ________________________________________________________________________  Signed: Dot Torres MD    Procedures: see electronic medical records for all procedures/Xrays and details which were not copied into this note but were reviewed prior to creation of Plan. LAB DATA REVIEWED:    Recent Results (from the past 24 hour(s))   CBC WITH AUTOMATED DIFF    Collection Time: 06/11/19  3:22 PM   Result Value Ref Range    WBC 6.9 4.1 - 11.1 K/uL    RBC 3.90 (L) 4.10 - 5.70 M/uL    HGB 11.2 (L) 12.1 - 17.0 g/dL    HCT 35.3 (L) 36.6 - 50.3 %    MCV 90.5 80.0 - 99.0 FL    MCH 28.7 26.0 - 34.0 PG    MCHC 31.7 30.0 - 36.5 g/dL    RDW 12.8 11.5 - 14.5 %    PLATELET 669 560 - 260 K/uL    MPV 10.1 8.9 - 12.9 FL    NRBC 0.0 0  WBC    ABSOLUTE NRBC 0.00 0.00 - 0.01 K/uL    NEUTROPHILS 55 32 - 75 %    LYMPHOCYTES 28 12 - 49 %    MONOCYTES 12 5 - 13 %    EOSINOPHILS 4 0 - 7 %    BASOPHILS 1 0 - 1 %    IMMATURE GRANULOCYTES 0 0.0 - 0.5 %    ABS. NEUTROPHILS 3.8 1.8 - 8.0 K/UL    ABS. LYMPHOCYTES 2.0 0.8 - 3.5 K/UL    ABS. MONOCYTES 0.8 0.0 - 1.0 K/UL    ABS. EOSINOPHILS 0.3 0.0 - 0.4 K/UL    ABS. BASOPHILS 0.0 0.0 - 0.1 K/UL    ABS. IMM.  GRANS. 0.0 0.00 - 0.04 K/UL    DF AUTOMATED     METABOLIC PANEL, BASIC    Collection Time: 06/11/19  3:22 PM   Result Value Ref Range    Sodium 138 136 - 145 mmol/L    Potassium 4.2 3.5 - 5.1 mmol/L    Chloride 108 97 - 108 mmol/L    CO2 26 21 - 32 mmol/L    Anion gap 4 (L) 5 - 15 mmol/L    Glucose 84 65 - 100 mg/dL    BUN 23 (H) 6 - 20 MG/DL    Creatinine 1.35 (H) 0.70 - 1.30 MG/DL    BUN/Creatinine ratio 17 12 - 20      GFR est AA >60 >60 ml/min/1.73m2    GFR est non-AA 52 (L) >60 ml/min/1.73m2    Calcium 9.2 8.5 - 10.1 MG/DL   ANKLE BRACHIAL INDEX    Collection Time: 06/11/19  4:22 PM   Result Value Ref Range    Left arm  mmHg    Left posterior tibial 77 mmHg    Left anterior tibial 115 mmHg    Left toe pressure 32 mmHg    Right arm  mmHg    Right posterior tibial 164 mmHg    Right anterior tibial 153 mmHg    Right toe pressure 74 mmHg    Left DASIA 0.80     Right DASIA 1.15     Left TBI 0.22     Right TBI 0.52 EKG, 12 LEAD, INITIAL    Collection Time: 06/11/19  6:58 PM   Result Value Ref Range    Ventricular Rate 77 BPM    Atrial Rate 77 BPM    P-R Interval 214 ms    QRS Duration 96 ms    Q-T Interval 388 ms    QTC Calculation (Bezet) 439 ms    Calculated P Axis 63 degrees    Calculated R Axis 20 degrees    Calculated T Axis 23 degrees    Diagnosis       ** Poor data quality, interpretation may be adversely affected  Sinus rhythm with 1st degree AV block  Nonspecific T wave abnormality  No previous ECGs available

## 2019-06-12 NOTE — PERIOP NOTES
TRANSFER - IN REPORT:    Verbal report received from [address] RN(name) on Lissa Sandoval  being received from Oncology Room 1136(unit) for ordered procedure      Report consisted of patients Situation, Background, Assessment and   Recommendations(SBAR). Information from the following report(s) SBAR, Kardex, Intake/Output, MAR, Recent Results and Procedure Verification was reviewed with the receiving nurse. Opportunity for questions and clarification was provided. Assessment completed upon patients arrival to unit and care assumed.

## 2019-06-12 NOTE — CONSULTS
Infectious Disease Consult Note    Reason for Consult: R foot osteomyelitis   Date of Consultation: June 12, 2019  Date of Admission: 6/11/2019  Referring Physician: Zaira Saravia DPM      HPI:      Mr Dami Chiu is a 68year old gentleman with hx of DM, long standing wound on R heel area admitted with necrotic wound and osteomyelitis. He does not recall details but thinks he has had a wound develop on his R lateral foot about 2 months ago. Inciting event not clear but he does not recall any trauma/bites. Denied hardware in foot. Says he has had a draining wound for months and it has been smelling badly. Says he gets local wound care but denied any antibiotics till admission. Does recall part of wound and surrounding being red. Has pain and says has good sensation of foot. Denied fever, chills. Admits to weight loss but no appetite loss. Says he has been ambulating on foot. Says sugars at home in 100-upper 100's ranges. Denied any chest pain, dyspnea, back pain, or any particular joint pain, cough, Urinary symptoms, night sweats, dental issues, diarrhea. He was admitted to [facility] on 6/11/19 when he presented with R foot wound. He also came to ER on 6/8/19 with foot wound complaints. Notes from 6/8/19 ER visit indicate wound was without purulence or surrounding erythema. He was discharged with home care and podiatry follow up. Wound cultures from 6/8/19 + for Ampicillin sensitive E faecalis and Pseudomonas . From chart review, afebrile, normal WBC. A1C 7.9.     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   · Tobacco: denied   · Alcohol:occassionaly   · Illicit Drugs:denied · Sexual History : denied being active sexually   · Travel History: denied   · Exposures:  Has a dog at home, vaccinated, denied any bites, or licks to wounds , denied exposure of wound to any salt water, hot tub etc   Allergies:  No Known Allergies      Review of Systems:     Gen: Negative for chills, fevers, + weight loss   HEENT: Negative for headache, vision changes, ear ache or discharge, tingling,  nasal discharge, swelling, lumps in neck, sores on tongue   CV:  Negative for chest pain, dyspnea on exertion, leg edema   Lungs: Negative for shortness of breath, cough, wheezing   Abdomen: Negative for abdominal pain, nausea, vomiting, diarrhea, constipation   Genitourinary: Negative for genital pain or genital discharge     Neuro: Negative for headache, numbness, tingling, extremity weakness,  syncope, seizures    Skin: Negative for rash, sores/open wounds   Musculoskeletal: + R foot chronic wound, foul smelling discharge, pain     Endocrine: Negative for high or low blood sugars, heat or cold intolerance    Psych: Negative for manic behavior     Medications:  No current facility-administered medications on file prior to encounter. Current Outpatient Medications on File Prior to Encounter   Medication Sig Dispense Refill    metFORMIN (GLUCOPHAGE) 1,000 mg tablet Take 1,000 mg by mouth daily. Current Facility-Administered Medications:     [START ON 6/13/2019] Vancomycin.  Please obtain a trough ~0830 before 0900 dose on 6/13, thanks , , Other, ONCE, Ramone Liao MD    acetaminophen (TYLENOL) tablet 650 mg, 650 mg, Oral, Q6H PRN, Mariann Maciel MD    ondansetron (ZOFRAN) injection 4 mg, 4 mg, IntraVENous, Q4H PRN, Mariann Maciel MD    piperacillin-tazobactam (ZOSYN) 3.375 g in 0.9% sodium chloride (MBP/ADV) 100 mL, 3.375 g, IntraVENous, Q8H, Mariann Ovalle MD, Last Rate: 25 mL/hr at 06/12/19 0517, 3.375 g at 06/12/19 0517    sodium chloride (NS) flush 5-40 mL, 5-40 mL, IntraVENous, Q8H, Mirian COUCH MD, 5 mL at 06/12/19 0517    sodium chloride (NS) flush 5-40 mL, 5-40 mL, IntraVENous, PRN, Mariann Murray MD    ondansetron (ZOFRAN) injection 4 mg, 4 mg, IntraVENous, Q4H PRN, Mariann Murray MD    enoxaparin (LOVENOX) injection 40 mg, 40 mg, SubCUTAneous, Q24H, Mariann Ovalle MD, 40 mg at 06/11/19 2327    insulin lispro (HUMALOG) injection, , SubCUTAneous, Q6H, Mariann Ovalle MD, Stopped at 06/12/19 0000    glucose chewable tablet 16 g, 4 Tab, Oral, PRN, Jessica Diane MD    glucagon (GLUCAGEN) injection 1 mg, 1 mg, IntraMUSCular, PRN, Mirian COUCH MD    0.9% sodium chloride infusion, 75 mL/hr, IntraVENous, CONTINUOUS, Mirian COUCH MD, Last Rate: 75 mL/hr at 06/11/19 2317, 75 mL/hr at 06/11/19 2317    aspirin delayed-release tablet 81 mg, 81 mg, Oral, DAILY, Mirian COUCH MD, Stopped at 06/12/19 0843    vancomycin (VANCOCIN) 750 mg in 0.9% sodium chloride (MBP/ADV) 250 mL, 750 mg, IntraVENous, Q12H, Mariann Ovalle MD, 750 mg at 06/12/19 0845    sodium hypochlorite (FULL STRENGTH DAKIN'S) 0.5% irrigation (bottle), , Topical, DAILY, Mirian COUCH MD        Physical Exam:    Vitals:   Patient Vitals for the past 24 hrs:   Temp Pulse Resp BP SpO2   06/12/19 0752 98.1 °F (36.7 °C) 64 18 159/80 100 %   06/11/19 2348 96.4 °F (35.8 °C) 60 18 138/65 100 %   06/11/19 2127  71 19  100 %   06/11/19 2100  70 17 180/85 100 %   06/11/19 2019 97.3 °F (36.3 °C) 72 19 173/84 99 %   06/11/19 2016    173/84    06/11/19 1512 97.8 °F (36.6 °C) 80 18 116/75 99 %   ·   · GEN: NAD  · HEENT:no scleral icterus,  no cervical  lymphadenopathy, no sinus tenderness, no thrush, dentures   · CV: S1, S2 heard regularly  · Lungs: Clear to auscultation bilaterally  · Abdomen: soft, non distended, non tender,   · Extremities: no edema  · Neuro: Alert, oriented to self, place and situation, moves all extremities to commands, verbal   · Skin: no rash  · MSK: + R foot lateral ulcer , foul odor, no discharge, necrotic wound deep, per podiatry notes, extends to bone       Labs:   Recent Results (from the past 24 hour(s))   CBC WITH AUTOMATED DIFF    Collection Time: 06/11/19  3:22 PM   Result Value Ref Range    WBC 6.9 4.1 - 11.1 K/uL    RBC 3.90 (L) 4.10 - 5.70 M/uL    HGB 11.2 (L) 12.1 - 17.0 g/dL    HCT 35.3 (L) 36.6 - 50.3 %    MCV 90.5 80.0 - 99.0 FL    MCH 28.7 26.0 - 34.0 PG    MCHC 31.7 30.0 - 36.5 g/dL    RDW 12.8 11.5 - 14.5 %    PLATELET 163 803 - 783 K/uL    MPV 10.1 8.9 - 12.9 FL    NRBC 0.0 0  WBC    ABSOLUTE NRBC 0.00 0.00 - 0.01 K/uL    NEUTROPHILS 55 32 - 75 %    LYMPHOCYTES 28 12 - 49 %    MONOCYTES 12 5 - 13 %    EOSINOPHILS 4 0 - 7 %    BASOPHILS 1 0 - 1 %    IMMATURE GRANULOCYTES 0 0.0 - 0.5 %    ABS. NEUTROPHILS 3.8 1.8 - 8.0 K/UL    ABS. LYMPHOCYTES 2.0 0.8 - 3.5 K/UL    ABS. MONOCYTES 0.8 0.0 - 1.0 K/UL    ABS. EOSINOPHILS 0.3 0.0 - 0.4 K/UL    ABS. BASOPHILS 0.0 0.0 - 0.1 K/UL    ABS. IMM.  GRANS. 0.0 0.00 - 0.04 K/UL    DF AUTOMATED     METABOLIC PANEL, BASIC    Collection Time: 06/11/19  3:22 PM   Result Value Ref Range    Sodium 138 136 - 145 mmol/L    Potassium 4.2 3.5 - 5.1 mmol/L    Chloride 108 97 - 108 mmol/L    CO2 26 21 - 32 mmol/L    Anion gap 4 (L) 5 - 15 mmol/L    Glucose 84 65 - 100 mg/dL    BUN 23 (H) 6 - 20 MG/DL    Creatinine 1.35 (H) 0.70 - 1.30 MG/DL    BUN/Creatinine ratio 17 12 - 20      GFR est AA >60 >60 ml/min/1.73m2    GFR est non-AA 52 (L) >60 ml/min/1.73m2    Calcium 9.2 8.5 - 10.1 MG/DL   ANKLE BRACHIAL INDEX    Collection Time: 06/11/19  4:22 PM   Result Value Ref Range    Left arm  mmHg    Left posterior tibial 77 mmHg    Left anterior tibial 115 mmHg    Left toe pressure 32 mmHg    Right arm  mmHg    Right posterior tibial 164 mmHg    Right anterior tibial 153 mmHg    Right toe pressure 74 mmHg    Left DASIA 0.80     Right DASIA 1.15     Left TBI 0.22     Right TBI 0.52    EKG, 12 LEAD, INITIAL    Collection Time: 06/11/19  6:58 PM   Result Value Ref Range    Ventricular Rate 77 BPM    Atrial Rate 77 BPM    P-R Interval 214 ms    QRS Duration 96 ms    Q-T Interval 388 ms    QTC Calculation (Bezet) 439 ms    Calculated P Axis 63 degrees    Calculated R Axis 20 degrees    Calculated T Axis 23 degrees    Diagnosis       ** Poor data quality, interpretation may be adversely affected  Sinus rhythm with 1st degree AV block  Nonspecific T wave abnormality  No previous ECGs available  Confirmed by Ian Valencia ([id number]) on 6/12/2019 8:50:21 AM     CULTURE, BLOOD, PAIRED    Collection Time: 06/11/19  8:29 PM   Result Value Ref Range    Special Requests: NO SPECIAL REQUESTS      Culture result: NO GROWTH AFTER 9 HOURS     LACTIC ACID    Collection Time: 06/11/19  8:29 PM   Result Value Ref Range    Lactic acid 0.7 0.4 - 2.0 MMOL/L   GLUCOSE, POC    Collection Time: 06/12/19 12:00 AM   Result Value Ref Range    Glucose (POC) 122 (H) 65 - 100 mg/dL    Performed by Katheryn ESCOBAR (CON) PCT    CBC W/O DIFF    Collection Time: 06/12/19  5:23 AM   Result Value Ref Range    WBC 4.6 4.1 - 11.1 K/uL    RBC 3.47 (L) 4.10 - 5.70 M/uL    HGB 9.9 (L) 12.1 - 17.0 g/dL    HCT 31.0 (L) 36.6 - 50.3 %    MCV 89.3 80.0 - 99.0 FL    MCH 28.5 26.0 - 34.0 PG    MCHC 31.9 30.0 - 36.5 g/dL    RDW 12.6 11.5 - 14.5 %    PLATELET 156 854 - 439 K/uL    MPV 9.6 8.9 - 12.9 FL    NRBC 0.0 0  WBC    ABSOLUTE NRBC 0.00 0.00 - 1.89 K/uL   METABOLIC PANEL, BASIC    Collection Time: 06/12/19  5:23 AM   Result Value Ref Range    Sodium 140 136 - 145 mmol/L    Potassium 3.7 3.5 - 5.1 mmol/L    Chloride 110 (H) 97 - 108 mmol/L    CO2 26 21 - 32 mmol/L    Anion gap 4 (L) 5 - 15 mmol/L    Glucose 98 65 - 100 mg/dL    BUN 18 6 - 20 MG/DL    Creatinine 1.06 0.70 - 1.30 MG/DL    BUN/Creatinine ratio 17 12 - 20      GFR est AA >60 >60 ml/min/1.73m2    GFR est non-AA >60 >60 ml/min/1.73m2    Calcium 8.3 (L) 8.5 - 10.1 MG/DL   HEMOGLOBIN A1C WITH EAG    Collection Time: 06/12/19  5:23 AM   Result Value Ref Range    Hemoglobin A1c 7.9 (H) 4.2 - 6.3 %    Est. average glucose 180 mg/dL   GLUCOSE, POC    Collection Time: 06/12/19 11:56 AM   Result Value Ref Range    Glucose (POC) 118 (H) 65 - 100 mg/dL    Performed by [facility]        Microbiology Data:       Blood:6/11/19  Specimen Information: Blood        Component Value Ref Range & Units Status   Special Requests: NO SPECIAL REQUESTS    Preliminary   Culture result: NO GROWTH AFTER 9 HOURS    Preliminary     Wound foot  Foot         Component Value Ref Range & Units Status   Special Requests: NO SPECIAL REQUESTS    Final   GRAM STAIN OCCASIONAL WBCS SEEN    Final   GRAM STAIN 3+ GRAM NEGATIVE RODS    Final   Culture result: Abnormal     Final   MODERATE ENTEROCOCCUS FAECALIS GROUP D    Culture result: Abnormal     Final   LIGHT STREPTOCOCCI, BETA HEMOLYTIC GROUP B . Penicillin and ampicillin are drugs of choice for treatment of beta-hemolytic streptococcal infections. Susceptibility testing of penicillins and beta-lactams approved by the FDA for treatment of beta-hemolytic streptococcal infections need not be performed routinely, because nonsusceptible isolates are extremely rare.  CLSI 2012   Culture result: FEW PSEUDOMONAS AERUGINOSAAbnormal     Final   Susceptibility      Enterococcus  faecalis group D Pseudomonas aeruginosa     YASMINE YASMINE    Amikacin ($)   <=16 ug/mL S    Ampicillin ($) <=2 ug/mL S      Aztreonam ($$$$)   <=4 ug/mL S    Cefepime ($$)   <=4 ug/mL S    Ceftazidime ($)   <=1 ug/mL S    Ciprofloxacin ($)   <=1 ug/mL S    Daptomycin ($$$$$) 1 ug/mL S      Gentamicin ($)   <=4 ug/mL S    Imipenem   <=1 ug/mL S    Levofloxacin ($)   <=2 ug/mL S    Linezolid ($$$$$) 2 ug/mL S      Meropenem ($$)   <=1 ug/mL S    Penicillin G ($$) 2 ug/mL S      Piperacillin/Tazobac ($)   <=16 ug/mL S    Tobramycin ($)   <=4 ug/mL S    Vancomycin ($) 2 ug/mL S                    Urine:     Synovial/Joint fluid:     Sputum/BAL/Pleural:     Peritoneal:    Imaging:     MRI R foot   FINDINGS: Bone marrow: There is a small erosion of the posterior lateral  inferior calcaneus with underlying enhancement and edema and decreased T1 signal  consistent with acute osteomyelitis.     Joint fluid:  None.     Tendons: Intact.     Muscles: Mild edema in the musculature likely related to diabetic neuropathy.     Neurovascular bundles: Within normal limits.     Articular cartilage:Intact without focal osteochondral lesion.     Soft tissue mass: There is a soft tissue ulcer of the posterior lateral healed  extending to near to the calcaneus. There is nonspecific subcutaneous edema  surrounding the foot and ankle.     IMPRESSION  IMPRESSION:   Ulceration of the posterior lateral heel with acute osteomyelitis in the  posterior lateral inferior calcaneus.       DASIA 6/11/19  Interpretation Summary     · The right resting DASIA is normal.  · The left resting DASIA is mildly decreased. Arterial disease noted at the level of the femoral artery, popliteal and tibial artery on the left side. · Significant disease in the left pedial arch and digits. · The left TBI is decreased to 0.22. · The right TBI is 0.52. X ray R foot 6/8/19     FINDINGS: Three views of the right foot. There is ulceration of the plantar  aspect of the heel. Moderate degenerative changes are seen in the tibiotalar  joint. There is a pes planus deformity with mild degenerative changes in the  midfoot. There is a type II accessory navicular bone. There is a hallux valgus  deformity. No acute fracture or dislocation. No evidence of osteomyelitis. There  is mild soft tissue swelling of the dorsum of the foot.     IMPRESSION  IMPRESSION:   1. Ulceration of the plantar aspect of the heel without evidence of  osteomyelitis. 2. Mild soft tissue swelling of the dorsum of the foot. 3. Degenerative changes.       Assessment / Plan:       Mr Sulema Andrea is a 68year old gentleman with hx of DM, long standing wound on R heel area admitted with necrotic wound and osteomyelitis. 1) R foot osteomyelitis:  Await OR today  Please send off bone for aerobic, anaerobic bacterial culture, fungal culture , AFB culture and pathology  If any resection /amputation, please send bone for margins   Vancomycin and Zosyn IV . Dosing per pharmacy, trough goal 15-20, with close renal monitoring   Ideally would have liked to get bone biopsies/cultures off antibiotics but already started and plan for OR this evening . Additionally podiatry notes indicate he had copious amounts discharge wt foul odor and also local erythema and edema of R foot   Antibiotic side effects including risk for nephrotoxicity, CDI, hematological, GI adverse effects explained   Final duration, choice and route of antibiotics to be determined   Probiotics/yogurt encouraged   Offloading pressure per Podiatry       2) DM  A1C 7.9     3) Screening: HIV and Hep C recommended and he consented     4) DVT px         Thank for the opportunity to participate in the care of this patient. Please contact with questions or concerns.        Adrian Soto, DO  12:52 PM

## 2019-06-12 NOTE — ANESTHESIA PREPROCEDURE EVALUATION
Relevant Problems   No relevant active problems       Anesthetic History   No history of anesthetic complications            Review of Systems / Medical History  Patient summary reviewed, nursing notes reviewed and pertinent labs reviewed    Pulmonary  Within defined limits                 Neuro/Psych   Within defined limits           Cardiovascular  Within defined limits                Exercise tolerance: >4 METS     GI/Hepatic/Renal  Within defined limits              Endo/Other    Diabetes         Other Findings   Comments: Osteomyelitis                 Anesthetic Plan    ASA: 3  Anesthesia type: MAC and total IV anesthesia    Monitoring Plan: BIS      Induction: Intravenous  Anesthetic plan and risks discussed with: Patient

## 2019-06-12 NOTE — PERIOP NOTES
1745:  Dr. Melissa Tan made aware of BS 68.   Orders obtained and patient medicated per [facility]

## 2019-06-12 NOTE — CONSULTS
Podiatry Consult    Subjective: Pt seen for first time in my office on 19. He related a 2 month Hx of wound lateral right heel. Treated with local wound care and bandaging. Denied any antibiotics. Wound had gotten progressively worse and began to have foul odor recently. Date of Consultation:  2019    Referring Physician:     Patient is a 68 y.o.  male who is being seen for diabetic ulcer and suspected osteomyelitis right heel. Patient Active Problem List    Diagnosis Date Noted    Diabetic foot ulcer ([facility] 2019    Acute osteomyelitis of right foot ([facility] 2019    Controlled type 2 diabetes mellitus with skin complication, without long-term [facility]     Past Medical History:   Diagnosis Date    Diabetes ([facility]       No family history on file. Social History     Tobacco Use    Smoking status: Never Smoker    Smokeless tobacco: Never Used   Substance Use Topics    Alcohol use: Yes     Comment: occ     No past surgical history on file. Prior to Admission medications    Medication Sig Start Date End Date Taking? Authorizing Provider   metFORMIN (GLUCOPHAGE) 1,000 mg tablet Take 1,000 mg by mouth daily. Other, MD Simón     No Known Allergies     Review of Systems:  AO x3. Denies fever, chills, nausea, vomiting. Objective:     Patient Vitals for the past 8 hrs:   BP Temp Pulse Resp SpO2 Height Weight   19 2127   71 19 100 %     19 180/85  70 17 100 %     19 173/84 97.3 °F (36.3 °C) 72 19 99 %     19 173/84         19 1512 116/75 97.8 °F (36.6 °C) 80 18 99 % 6' (1.829 m) 80.3 kg (177 lb 0.5 oz)     Temp (24hrs), Av.6 °F (36.4 °C), Min:97.3 °F (36.3 °C), Max:97.8 °F (36.6 °C)    Lower Extremity Exam  Callus left heel, but no opening at this location.  On lateral right heel is a 7 x 4 x 1 cm ulceration with copious drainage, foul odor and wet necrotic base; wound probes to bone. There is local erythema and calor of the right heel, and edema of the entire right foot. DP and PT 1/4 bilaterally, CFT delayed  Sensation intact to light touch    ABIs: Right TBI is .52 and digital pressure 74 mmHg; no significant arterial disease. MRI right foot: Increased T2 signal and decreased T1 signal right calcaneus along with cortical bone destruction beneath the lateral heel ulcer. Consistent with osteomyelitis. Data Review:   Recent Results (from the past 24 hour(s))   CBC WITH AUTOMATED DIFF    Collection Time: 06/11/19  3:22 PM   Result Value Ref Range    WBC 6.9 4.1 - 11.1 K/uL    RBC 3.90 (L) 4.10 - 5.70 M/uL    HGB 11.2 (L) 12.1 - 17.0 g/dL    HCT 35.3 (L) 36.6 - 50.3 %    MCV 90.5 80.0 - 99.0 FL    MCH 28.7 26.0 - 34.0 PG    MCHC 31.7 30.0 - 36.5 g/dL    RDW 12.8 11.5 - 14.5 %    PLATELET 415 362 - 102 K/uL    MPV 10.1 8.9 - 12.9 FL    NRBC 0.0 0  WBC    ABSOLUTE NRBC 0.00 0.00 - 0.01 K/uL    NEUTROPHILS 55 32 - 75 %    LYMPHOCYTES 28 12 - 49 %    MONOCYTES 12 5 - 13 %    EOSINOPHILS 4 0 - 7 %    BASOPHILS 1 0 - 1 %    IMMATURE GRANULOCYTES 0 0.0 - 0.5 %    ABS. NEUTROPHILS 3.8 1.8 - 8.0 K/UL    ABS. LYMPHOCYTES 2.0 0.8 - 3.5 K/UL    ABS. MONOCYTES 0.8 0.0 - 1.0 K/UL    ABS. EOSINOPHILS 0.3 0.0 - 0.4 K/UL    ABS. BASOPHILS 0.0 0.0 - 0.1 K/UL    ABS. IMM.  GRANS. 0.0 0.00 - 0.04 K/UL    DF AUTOMATED     METABOLIC PANEL, BASIC    Collection Time: 06/11/19  3:22 PM   Result Value Ref Range    Sodium 138 136 - 145 mmol/L    Potassium 4.2 3.5 - 5.1 mmol/L    Chloride 108 97 - 108 mmol/L    CO2 26 21 - 32 mmol/L    Anion gap 4 (L) 5 - 15 mmol/L    Glucose 84 65 - 100 mg/dL    BUN 23 (H) 6 - 20 MG/DL    Creatinine 1.35 (H) 0.70 - 1.30 MG/DL    BUN/Creatinine ratio 17 12 - 20      GFR est AA >60 >60 ml/min/1.73m2    GFR est non-AA 52 (L) >60 ml/min/1.73m2    Calcium 9.2 8.5 - 10.1 MG/DL   ANKLE BRACHIAL INDEX    Collection Time: 06/11/19  4:22 PM   Result Value Ref Range    Left arm BP 137 mmHg    Left posterior tibial 77 mmHg    Left anterior tibial 115 mmHg    Left toe pressure 32 mmHg    Right arm  mmHg    Right posterior tibial 164 mmHg    Right anterior tibial 153 mmHg    Right toe pressure 74 mmHg    Left DASIA 0.80     Right ADSIA 1.15     Left TBI 0.22     Right TBI 0.52    EKG, 12 LEAD, INITIAL    Collection Time: 06/11/19  6:58 PM   Result Value Ref Range    Ventricular Rate 77 BPM    Atrial Rate 77 BPM    P-R Interval 214 ms    QRS Duration 96 ms    Q-T Interval 388 ms    QTC Calculation (Bezet) 439 ms    Calculated P Axis 63 degrees    Calculated R Axis 20 degrees    Calculated T Axis 23 degrees    Diagnosis       ** Poor data quality, interpretation may be adversely affected  Sinus rhythm with 1st degree AV block  Nonspecific T wave abnormality  No previous ECGs available     LACTIC ACID    Collection Time: 06/11/19  8:29 PM   Result Value Ref Range    Lactic acid 0.7 0.4 - 2.0 MMOL/L         Impression:   1. Cellulitis right foot  2. Diabetic ulcer right foot  3. Osteomyelitis right calcaneus      Recommendation:   I will consult Infectious Disease. Will need to discuss BKA versus bone debridement and probable Wound VAC right heel. There is significant risk of continuing onto BKA even with attempts to salvage the limb.

## 2019-06-12 NOTE — PROGRESS NOTES
Reason for Admission:   Acute Osteomyelitis of R foot; [facility] [address] (Diabetic Foot)                   RRAT Score:    12                Plan for utilizing home health:  NO prior HH or SNF in the past. Pt is independent w/ADL's and requires no assistance (prior to this admission). Current Advanced Directive/Advance Care Plan: FULL Code. Not on file. No POA. Has four adult children and all of them reside in [place]. Transition of Care Plan:   Pt lives alone in a tri-level home w/one step to enter the front door. There are a grand total of 14 steps between the different levels in the home. Pt is a  and utilizes the [place] for all medical care. PCP @ [facility] seen @ [facility] on June 3rd, 2019. Pt has [facility]. SW to continue to assist.    Care Management Interventions  PCP Verified by CM: Yes(Yaneth 3, 2019)  Mode of Transport at Discharge:  Other (see comment)(Family/friend)  Transition of Care Consult (CM Consult): Discharge Planning  Discharge Durable Medical Equipment: (No DME or O2.)  Current Support Network: Own Home(Tri-Level home w/one step to enter the home.  )  Confirm Follow Up Transport: Family  Plan discussed with Pt/Family/Caregiver: Yes  Discharge Location  Discharge Placement: (Home )      Cade Harding, MSW  [phone]

## 2019-06-12 NOTE — PROGRESS NOTES
Hospitalist Progress Note    NAME: Cristhian Miller   :  1950   MRN:  [id number]       Assessment / Plan:    Acute Osteomyelitis of R Foot POA  R foot Cellulitis POA  R Diabetic Foot Ulcer- non healing POA  Newly diagnosed DM type 2 POA- in 2019  PAD s/p R leg stent at [place] per pt in 2019   Recent Wound Cx- growing Pseudomonas & enterococcal sp  MRI R foot noted- Acute OM of the calcaneum     Cont on medical/surgical bed  IVF  IV abx- vanc + zosyn  Follow Blood Cx and wound cx/OR specimens  IP Podiatry consulted- NPO for OR today  IP ID consulted by podiatry  FS Q AC & HS when eating, hold metformin, follow with ssi, a1c 7.9  Cont home ASA daily  May need picc/IV abx ? Rehab at [place], lives alone    CKD 2-3  Stable, follow    Mild chronic anemia  -follow           Code Status: Full  Surrogate Decision Maker: friend Natalie Miramontes [facility] [place] # [phone]     DVT Prophylaxis: Sq lovenox  GI Prophylaxis: not indicated  Baseline: Pt lives with a friend, ambulatory    18.5 - 24.9 Normal weight / Body mass index is 24.01 kg/m². Recommended Disposition: TBD     Subjective:     Chief Complaint / Reason for Physician Visit  Foul smelling foot ulcer    Patient reports some tingling in his right foot but no pain and otherwise feels well. Normal appetite, no shortness of breath no chest pain. Plans are to go to the OR later today like to try to save his foot rather than amputation. He lives alone at home we discussed he may need IV antibiotics and rehab at discharge. Patient was evaluated at 11:15 AM        Discussed with RN events overnight.      Review of Systems:  Symptom Y/N Comments  Symptom Y/N Comments   Fever/Chills    Chest Pain n    Poor Appetite n   Edema     Cough n   Abdominal Pain n    Sputum    Joint Pain     SOB/BLACK n   Pruritis/Rash     Nausea/vomit n   Tolerating PT/OT     Diarrhea    Tolerating Diet y    Constipation    Other       Could NOT obtain due to:      Objective:     VITALS:   Last 24hrs VS reviewed since prior progress note. Most recent are:  Patient Vitals for the past 24 hrs:   Temp Pulse Resp BP SpO2   06/12/19 0752 98.1 °F (36.7 °C) 64 18 159/80 100 %   06/11/19 2348 96.4 °F (35.8 °C) 60 18 138/65 100 %   06/11/19 2127  71 19  100 %   06/11/19 2100  70 17 180/85 100 %   06/11/19 2019 97.3 °F (36.3 °C) 72 19 173/84 99 %   06/11/19 2016    173/84    06/11/19 1512 97.8 °F (36.6 °C) 80 18 116/75 99 %       Intake/Output Summary (Last 24 hours) at 6/12/2019 1152  Last data filed at 6/12/2019 1114  Gross per 24 hour   Intake 870 ml   Output 1350 ml   Net -480 ml        PHYSICAL EXAM:  Patient is awake and alert oriented x3 on room air no distress nontoxic-appearing. Conjunctiva pink mucous membranes moist.  Cardiovascular regular rate slight systolic murmur no rubs or gallops. Lungs are clear no wheezes rhonchi or crackles. Abdomen bowel sounds present soft nontender extremities no clubbing cyanosis or edema his right foot is dressed I do not see any surrounding erythema outside of the dressing area I am able to palpate pedal pulses in both feet. Reviewed most current lab test results and cultures  YES  Reviewed most current radiology test results   YES  Review and summation of old records today    NO  Reviewed patient's current orders and MAR    YES  PMH/ reviewed - no change compared to H&P  ________________________________________________________________________  Care Plan discussed with:    Comments   Patient y    Family      RN y    Care Manager     Consultant                        Multidiciplinary team rounds were held today with , nursing, pharmacist and clinical coordinator. Patient's plan of care was discussed; medications were reviewed and discharge planning was addressed.      ________________________________________________________________________  Total NON critical care TIME:   Minutes    Total CRITICAL CARE TIME Spent:   Minutes non procedure based      Comments   >50% of visit spent in counseling and coordination of care     ________________________________________________________________________  Marielle Burgos MD     Procedures: see electronic medical records for all procedures/Xrays and details which were not copied into this note but were reviewed prior to creation of Plan. LABS:  I reviewed today's most current labs and imaging studies.   Pertinent labs include:  Recent Labs     06/12/19  0523 06/11/19  1522   WBC 4.6 6.9   HGB 9.9* 11.2*   HCT 31.0* 35.3*    369     Recent Labs     06/12/19 0523 06/11/19  1522    138   K 3.7 4.2   * 108   CO2 26 26   GLU 98 84   BUN 18 23*   CREA 1.06 1.35*   CA 8.3* 9.2       Signed: Marielle Burgos MD

## 2019-06-13 LAB
ANION GAP SERPL CALC-SCNC: 5 MMOL/L (ref 5–15)
BASOPHILS # BLD: 0 K/UL (ref 0–0.1)
BASOPHILS NFR BLD: 0 % (ref 0–1)
BUN SERPL-MCNC: 13 MG/DL (ref 6–20)
BUN/CREAT SERPL: 13 (ref 12–20)
CALCIUM SERPL-MCNC: 8.4 MG/DL (ref 8.5–10.1)
CHLORIDE SERPL-SCNC: 108 MMOL/L (ref 97–108)
CO2 SERPL-SCNC: 25 MMOL/L (ref 21–32)
CREAT SERPL-MCNC: 0.97 MG/DL (ref 0.7–1.3)
DATE LAST DOSE: ABNORMAL
DIFFERENTIAL METHOD BLD: ABNORMAL
EOSINOPHIL # BLD: 0 K/UL (ref 0–0.4)
EOSINOPHIL NFR BLD: 0 % (ref 0–7)
ERYTHROCYTE [DISTWIDTH] IN BLOOD BY AUTOMATED COUNT: 12.7 % (ref 11.5–14.5)
ERYTHROCYTE [SEDIMENTATION RATE] IN BLOOD: 81 MM/HR (ref 0–20)
GLUCOSE BLD STRIP.AUTO-MCNC: 129 MG/DL (ref 65–100)
GLUCOSE BLD STRIP.AUTO-MCNC: 147 MG/DL (ref 65–100)
GLUCOSE BLD STRIP.AUTO-MCNC: 150 MG/DL (ref 65–100)
GLUCOSE BLD STRIP.AUTO-MCNC: 175 MG/DL (ref 65–100)
GLUCOSE SERPL-MCNC: 138 MG/DL (ref 65–100)
HCT VFR BLD AUTO: 32.7 % (ref 36.6–50.3)
HCV AB SERPL QL IA: NONREACTIVE
HCV COMMENT,HCGAC: NORMAL
HGB BLD-MCNC: 10.4 G/DL (ref 12.1–17)
HIV 1+2 AB+HIV1 P24 AG SERPL QL IA: NONREACTIVE
HIV12 RESULT COMMENT, HHIVC: NORMAL
IMM GRANULOCYTES # BLD AUTO: 0.1 K/UL (ref 0–0.04)
IMM GRANULOCYTES NFR BLD AUTO: 1 % (ref 0–0.5)
LYMPHOCYTES # BLD: 0.8 K/UL (ref 0.8–3.5)
LYMPHOCYTES NFR BLD: 8 % (ref 12–49)
MAGNESIUM SERPL-MCNC: 1.9 MG/DL (ref 1.6–2.4)
MCH RBC QN AUTO: 28.6 PG (ref 26–34)
MCHC RBC AUTO-ENTMCNC: 31.8 G/DL (ref 30–36.5)
MCV RBC AUTO: 89.8 FL (ref 80–99)
MONOCYTES # BLD: 0.8 K/UL (ref 0–1)
MONOCYTES NFR BLD: 8 % (ref 5–13)
NEUTS SEG # BLD: 7.8 K/UL (ref 1.8–8)
NEUTS SEG NFR BLD: 83 % (ref 32–75)
NRBC # BLD: 0 K/UL (ref 0–0.01)
NRBC BLD-RTO: 0 PER 100 WBC
PHOSPHATE SERPL-MCNC: 3.6 MG/DL (ref 2.6–4.7)
PLATELET # BLD AUTO: 302 K/UL (ref 150–400)
PMV BLD AUTO: 9.8 FL (ref 8.9–12.9)
POTASSIUM SERPL-SCNC: 4.1 MMOL/L (ref 3.5–5.1)
RBC # BLD AUTO: 3.64 M/UL (ref 4.1–5.7)
RBC MORPH BLD: ABNORMAL
REPORTED DOSE,DOSE: ABNORMAL UNITS
REPORTED DOSE/TIME,TMG: 2100
SERVICE CMNT-IMP: ABNORMAL
SODIUM SERPL-SCNC: 138 MMOL/L (ref 136–145)
VANCOMYCIN TROUGH SERPL-MCNC: 11.3 UG/ML (ref 5–10)
WBC # BLD AUTO: 9.5 K/UL (ref 4.1–11.1)

## 2019-06-13 PROCEDURE — 36415 COLL VENOUS BLD VENIPUNCTURE: CPT

## 2019-06-13 PROCEDURE — 82962 GLUCOSE BLOOD TEST: CPT

## 2019-06-13 PROCEDURE — 74011250636 HC RX REV CODE- 250/636: Performed by: INTERNAL MEDICINE

## 2019-06-13 PROCEDURE — 85652 RBC SED RATE AUTOMATED: CPT

## 2019-06-13 PROCEDURE — 85025 COMPLETE CBC W/AUTO DIFF WBC: CPT

## 2019-06-13 PROCEDURE — 74011000258 HC RX REV CODE- 258: Performed by: PODIATRIST

## 2019-06-13 PROCEDURE — 94760 N-INVAS EAR/PLS OXIMETRY 1: CPT

## 2019-06-13 PROCEDURE — 84100 ASSAY OF PHOSPHORUS: CPT

## 2019-06-13 PROCEDURE — 77010033678 HC OXYGEN DAILY

## 2019-06-13 PROCEDURE — 83735 ASSAY OF MAGNESIUM: CPT

## 2019-06-13 PROCEDURE — 86803 HEPATITIS C AB TEST: CPT

## 2019-06-13 PROCEDURE — 74011250636 HC RX REV CODE- 250/636: Performed by: PODIATRIST

## 2019-06-13 PROCEDURE — 74011250637 HC RX REV CODE- 250/637: Performed by: PODIATRIST

## 2019-06-13 PROCEDURE — 80048 BASIC METABOLIC PNL TOTAL CA: CPT

## 2019-06-13 PROCEDURE — 65270000015 HC RM PRIVATE ONCOLOGY

## 2019-06-13 PROCEDURE — 87389 HIV-1 AG W/HIV-1&-2 AB AG IA: CPT

## 2019-06-13 PROCEDURE — 74011636637 HC RX REV CODE- 636/637: Performed by: PODIATRIST

## 2019-06-13 PROCEDURE — 80202 ASSAY OF VANCOMYCIN: CPT

## 2019-06-13 RX ORDER — VANCOMYCIN HYDROCHLORIDE
1250 EVERY 12 HOURS
Status: DISCONTINUED | OUTPATIENT
Start: 2019-06-13 | End: 2019-06-16

## 2019-06-13 RX ADMIN — INSULIN LISPRO 2 UNITS: 100 INJECTION, SOLUTION INTRAVENOUS; SUBCUTANEOUS at 01:00

## 2019-06-13 RX ADMIN — OXYCODONE HYDROCHLORIDE AND ACETAMINOPHEN 1 TABLET: 7.5; 325 TABLET ORAL at 12:36

## 2019-06-13 RX ADMIN — Medication 10 ML: at 05:56

## 2019-06-13 RX ADMIN — VANCOMYCIN HYDROCHLORIDE 1250 MG: 10 INJECTION, POWDER, LYOPHILIZED, FOR SOLUTION INTRAVENOUS at 21:12

## 2019-06-13 RX ADMIN — INSULIN LISPRO 2 UNITS: 100 INJECTION, SOLUTION INTRAVENOUS; SUBCUTANEOUS at 18:48

## 2019-06-13 RX ADMIN — OXYCODONE HYDROCHLORIDE AND ACETAMINOPHEN 1 TABLET: 7.5; 325 TABLET ORAL at 01:03

## 2019-06-13 RX ADMIN — ASPIRIN 81 MG: 81 TABLET, COATED ORAL at 10:23

## 2019-06-13 RX ADMIN — PIPERACILLIN SODIUM,TAZOBACTAM SODIUM 3.38 G: 3; .375 INJECTION, POWDER, FOR SOLUTION INTRAVENOUS at 05:23

## 2019-06-13 RX ADMIN — Medication 10 ML: at 13:37

## 2019-06-13 RX ADMIN — VANCOMYCIN HYDROCHLORIDE 750 MG: 750 INJECTION, POWDER, LYOPHILIZED, FOR SOLUTION INTRAVENOUS at 10:23

## 2019-06-13 RX ADMIN — Medication 10 ML: at 21:14

## 2019-06-13 RX ADMIN — INSULIN LISPRO 2 UNITS: 100 INJECTION, SOLUTION INTRAVENOUS; SUBCUTANEOUS at 12:35

## 2019-06-13 RX ADMIN — PIPERACILLIN SODIUM,TAZOBACTAM SODIUM 3.38 G: 3; .375 INJECTION, POWDER, FOR SOLUTION INTRAVENOUS at 20:01

## 2019-06-13 RX ADMIN — PIPERACILLIN SODIUM,TAZOBACTAM SODIUM 3.38 G: 3; .375 INJECTION, POWDER, FOR SOLUTION INTRAVENOUS at 13:37

## 2019-06-13 RX ADMIN — OXYCODONE HYDROCHLORIDE AND ACETAMINOPHEN 1 TABLET: 7.5; 325 TABLET ORAL at 20:01

## 2019-06-13 NOTE — PROGRESS NOTES
Pharmacy Automatic Renal Dosing Protocol - Antimicrobials    Indication for Antimicrobials: SSTI, Osteomyelitis    Current Regimen of Each Antimicrobial:  Vancomycin 1500 mg iv x1, 750 mg IV q12h (start , day 3/5)  Zosyn 3.375 g IV every 8 hours (start , day 3/5)    Previous Antimicrobial Therapy:    Goal Level: VANCOMYCIN TROUGH GOAL RANGE    Vancomycin Trough: 15 - 20 mcg/mL    Date Dose & Interval Measured (mcg/mL) Extrapolated (mcg/mL)    750 mg q12h 11.3 10.2                 Date & time of next level: to be decided    Significant Cultures:    Blood: NG. Pending   Tissue: rare GPC. Pending.  AFB: Pending   Anaerobe: Pending   Fungus: pending    Radiology / Imaging results: (X-ray, CT scan or MRI):    MRI: Ulceration of the posterior lateral heel with acute osteomyelitis in the  posterior lateral inferior calcaneus. Paralysis, amputations, malnutrition:     Labs:  Recent Labs     19  0523 19  1522   CREA 1.06 1.35*   BUN 18 23*   WBC 4.6 6.9     Temp (24hrs), Av.4 °F (36.3 °C), Min:96.4 °F (35.8 °C), Max:98.1 °F (36.7 °C)    Creatinine Clearance (mL/min) or Dialysis: 77 ml/min    Impression/Plan:   · ID on board. · Vancomycin trough sub-therapeutic. Increase dose to 1250 mg IV q12h for a predicted trough ~17.  · Zosyn regimen appropriate. · Continue to monitor for renal function. · Antimicrobial stop date: 616 AM. initially planned dosing consulted x5 days/DPM.       Pharmacy will follow daily and adjust medications as appropriate for renal function and/or serum levels.     Thank you,  Rickie Mcburney, PHARMD

## 2019-06-13 NOTE — PERIOP NOTES
TRANSFER - IN REPORT:    Verbal report received from CATIA Del Valle RN(name) on Cristhian Miller  being received from OR(unit) for routine post - op      Report consisted of patients Situation, Background, Assessment and   Recommendations(SBAR). Information from the following report(s) OR Summary was reviewed with the receiving nurse. Opportunity for questions and clarification was provided. Assessment completed upon patients arrival to unit and care assumed.

## 2019-06-13 NOTE — INTERDISCIPLINARY ROUNDS
Oncology Interdisciplinary rounds were held today to discuss patient plan of care and outcomes. The following members were present: Nursing, Physician, Case Management, Pharmacy, and PT/OT Actual Length of Stay: 2 Expected Length of Stay: - - - Plan            Discharge IV ABX Wound Care Home with family Transport family

## 2019-06-13 NOTE — PROGRESS NOTES
Marla Vaughan from lab called with results from a tissue culture. Patient has rare gram negative bello from his culture. I informed Dr. Crescencio Vaz about this new finding.

## 2019-06-13 NOTE — PROGRESS NOTES
Hospitalist Progress Note    NAME: Jose Londono   :  1950   MRN:  [id number]       Assessment / Plan:    Acute Osteomyelitis of R Foot POA  R foot Cellulitis POA  R Diabetic Foot Ulcer- non healing POA  Newly diagnosed DM type 2 POA- in 2019  PAD s/p R leg stent at [place] per pt in 2019   Recent Wound Cx- growing Pseudomonas & enterococcal sp  MRI R foot noted- Acute OM of the calcaneum     Cont on medical/surgical bed  IVF, stop good pos  IV abx- vanc + zosyn  Follow Blood Cx and wound cx/OR specimens  IP Podiatry consulted-s/p I&D   IP ID consulted by podiatry- appreciate eval  FS Q AC & HS when eating, hold metformin, follow with ssi, a1c 7.9  Cont home ASA daily  May need picc/IV abx ? Rehab at [place], lives alone  Wean o2 off, no sxs, further w/u if documented hypoxia    CKD 2-3  Stable, follow    Mild chronic anemia  -follow           Code Status: Full  Surrogate Decision Maker: friend Natalie Addison # [phone]     DVT Prophylaxis: Sq lovenox  GI Prophylaxis: not indicated  Baseline: Pt lives with a friend, ambulatory    18.5 - 24.9 Normal weight / Body mass index is 24.01 kg/m². Recommended Disposition: TBD     Subjective:     Chief Complaint / Reason for Physician Visit  Foul smelling foot ulcer     Patient reports some tingling in his right foot but no pain and otherwise feels well. Normal appetite, no shortness of breath no chest pain. Plans are to go to the OR later today like to try to save his foot rather than amputation. He lives alone at home we discussed he may need IV antibiotics and rehab at discharge.  pt w/o c/os. No pain in foot. No sob/cp. On  since returning from OR    Patient was evaluated at 11 AM        Discussed with RN events overnight.      Review of Systems:  Symptom Y/N Comments  Symptom Y/N Comments   Fever/Chills    Chest Pain n    Poor Appetite n   Edema     Cough n   Abdominal Pain n    Sputum    Joint Pain     SOB/BLACK n Pruritis/Rash     Nausea/vomit n   Tolerating PT/OT     Diarrhea    Tolerating Diet y    Constipation    Other       Could NOT obtain due to:      Objective:     VITALS:   Last 24hrs VS reviewed since prior progress note. Most recent are:  Patient Vitals for the past 24 hrs:   Temp Pulse Resp BP SpO2   06/13/19 0756 98.2 °F (36.8 °C) 77 16 147/82 100 %   06/13/19 0031 98 °F (36.7 °C)  15 123/69 99 %   06/12/19 2245 98.5 °F (36.9 °C) 83 14 137/72 98 %   06/12/19 2215  76 12 129/65 99 %   06/12/19 2200 98 °F (36.7 °C) 78 12 133/65 97 %   06/12/19 2145  81 12 133/69 96 %   06/12/19 2130  80 18 120/53 96 %   06/12/19 2120  81 15 109/56 96 %   06/12/19 2115  78 13 114/60 97 %   06/12/19 2110  79 13 110/57 97 %   06/12/19 2106 97.6 °F (36.4 °C) 80 12 122/56 97 %   06/12/19 2105  80 13 109/58 97 %   06/12/19 1543 98.1 °F (36.7 °C) 67 18 185/78 100 %       Intake/Output Summary (Last 24 hours) at 6/13/2019 1113  Last data filed at 6/13/2019 0805  Gross per 24 hour   Intake 1100 ml   Output 1750 ml   Net -650 ml        PHYSICAL EXAM:  Patient is awake and alert oriented x3 onNC no distress nontoxic-appearing. Conjunctiva pink mucous membranes moist.  Cardiovascular regular rate slight systolic murmur no rubs or gallops. Lungs are clear no wheezes rhonchi or crackles.   Abdomen bowel sounds present soft nontender extremities no clubbing cyanosis or edema his right foot is dressed blood stained      Reviewed most current lab test results and cultures  YES  Reviewed most current radiology test results   YES  Review and summation of old records today    NO  Reviewed patient's current orders and MAR    YES  PMH/SH reviewed - no change compared to H&P  ________________________________________________________________________  Care Plan discussed with:    Comments   Patient y    Family      RN y    Care Manager     Consultant                        Multidiciplinary team rounds were held today with , nursing, pharmacist and clinical coordinator. Patient's plan of care was discussed; medications were reviewed and discharge planning was addressed. ________________________________________________________________________  Total NON critical care TIME:   Minutes    Total CRITICAL CARE TIME Spent:   Minutes non procedure based      Comments   >50% of visit spent in counseling and coordination of care     ________________________________________________________________________  Yanely Kumar MD     Procedures: see electronic medical records for all procedures/Xrays and details which were not copied into this note but were reviewed prior to creation of Plan. LABS:  I reviewed today's most current labs and imaging studies.   Pertinent labs include:  Recent Labs     06/13/19  0501 06/12/19  0523 06/11/19  1522   WBC 9.5 4.6 6.9   HGB 10.4* 9.9* 11.2*   HCT 32.7* 31.0* 35.3*    282 369     Recent Labs     06/13/19  0501 06/12/19  0523 06/11/19  1522    140 138   K 4.1 3.7 4.2    110* 108   CO2 25 26 26   * 98 84   BUN 13 18 23*   CREA 0.97 1.06 1.35*   CA 8.4* 8.3* 9.2   MG 1.9  --   --    PHOS 3.6  --   --        Signed: Yanely Kumar MD

## 2019-06-13 NOTE — PROGRESS NOTES
Podiatry    Subjective: Resting comfortably. No complaints regarding the right foot. Patient is a 68 y.o.  male who is being seen for osteomyelitis right calcaneus. POD#1    Patient Active Problem List    Diagnosis Date Noted    Diabetic foot ulcer ([facility] 2019    [facility]    Controlled type 2 diabetes mellitus with skin complication, without long-term current use of insulin ([facility] 2019     Past Medical History:   Diagnosis Date    [facility]     DVT [facility]deep venous thrombosis) [facility] 2019    Right leg stent placed    Thromboembolus [facility])      History reviewed. No pertinent surgical history. History reviewed. No pertinent family history. Social History     Tobacco Use    Smoking status: Never Smoker    Smokeless tobacco: Never Used   Substance Use Topics    Alcohol use: Yes     Alcohol/week: 3.6 oz     Types: 6 Cans of beer per week     Comment: occ     No Known Allergies  Prior to Admission medications    Medication Sig Start Date End Date Taking? Authorizing Provider   metFORMIN (GLUCOPHAGE) 1,000 mg tablet Take 1,000 mg by mouth daily. Other, MD Simón       Review of Systems AO x3. NAD. Objective:     Patient Vitals for the past 8 hrs:   BP Temp Pulse Resp SpO2   19 1920 133/69 98.2 °F (36.8 °C) 75 16 100 %   19 1528 136/67 98 °F (36.7 °C) 73 16 100 %     Temp (24hrs), Av.1 °F (36.7 °C), Min:97.6 °F (36.4 °C), Max:98.5 °F (36.9 °C)      Physical Exam Lower Extremity  Dressings removed. Right foot warm, no odor. Packing from Sx removed. Red, bleeding tissue in wound. DP and PT palpable right; CFT less than 3 seconds.     C&S: Intracellular gram+ cocci in pair; gram negative rods    Data Review:   Recent Results (from the past 24 hour(s))   CULTURE, TISSUE W GRAM STAIN    Collection Time: 19  7:37 PM   Result Value Ref Range    Special Requests: NO SPECIAL REQUESTS      GRAM STAIN OCCASIONAL WBCS SEEN      GRAM STAIN        RARE APPARENT INTRACELLULAR GRAM POSITIVE COCCI IN PAIRS    Culture result: RARE GRAM NEGATIVE RODS (A)      Culture result: CULTURE IN PROGRESS,FURTHER UPDATES TO FOLLOW     GLUCOSE, POC    Collection Time: 06/12/19  9:18 PM   Result Value Ref Range    Glucose (POC) 144 (H) 65 - 100 mg/dL    Performed by Allan Tate    GLUCOSE, POC    Collection Time: 06/13/19 12:33 AM   Result Value Ref Range    Glucose (POC) 147 (H) 65 - 100 mg/dL    Performed by Emma Lee (PCT)    CBC WITH AUTOMATED DIFF    Collection Time: 06/13/19  5:01 AM   Result Value Ref Range    WBC 9.5 4.1 - 11.1 K/uL    RBC 3.64 (L) 4.10 - 5.70 M/uL    HGB 10.4 (L) 12.1 - 17.0 g/dL    HCT 32.7 (L) 36.6 - 50.3 %    MCV 89.8 80.0 - 99.0 FL    MCH 28.6 26.0 - 34.0 PG    MCHC 31.8 30.0 - 36.5 g/dL    RDW 12.7 11.5 - 14.5 %    PLATELET 162 871 - 242 K/uL    MPV 9.8 8.9 - 12.9 FL    NRBC 0.0 0  WBC    ABSOLUTE NRBC 0.00 0.00 - 0.01 K/uL    NEUTROPHILS 83 (H) 32 - 75 %    LYMPHOCYTES 8 (L) 12 - 49 %    MONOCYTES 8 5 - 13 %    EOSINOPHILS 0 0 - 7 %    BASOPHILS 0 0 - 1 %    IMMATURE GRANULOCYTES 1 (H) 0.0 - 0.5 %    ABS. NEUTROPHILS 7.8 1.8 - 8.0 K/UL    ABS. LYMPHOCYTES 0.8 0.8 - 3.5 K/UL    ABS. MONOCYTES 0.8 0.0 - 1.0 K/UL    ABS. EOSINOPHILS 0.0 0.0 - 0.4 K/UL    ABS. BASOPHILS 0.0 0.0 - 0.1 K/UL    ABS. IMM.  GRANS. 0.1 (H) 0.00 - 0.04 K/UL    DF AUTOMATED      RBC COMMENTS NORMOCYTIC, NORMOCHROMIC     METABOLIC PANEL, BASIC    Collection Time: 06/13/19  5:01 AM   Result Value Ref Range    Sodium 138 136 - 145 mmol/L    Potassium 4.1 3.5 - 5.1 mmol/L    Chloride 108 97 - 108 mmol/L    CO2 25 21 - 32 mmol/L    Anion gap 5 5 - 15 mmol/L    Glucose 138 (H) 65 - 100 mg/dL    BUN 13 6 - 20 MG/DL    Creatinine 0.97 0.70 - 1.30 MG/DL    BUN/Creatinine ratio 13 12 - 20      GFR est AA >60 >60 ml/min/1.73m2    GFR est non-AA >60 >60 ml/min/1.73m2    Calcium 8.4 (L) 8.5 - 10.1 MG/DL   MAGNESIUM    Collection Time: 06/13/19  5:01 AM   Result Value Ref Range    Magnesium 1.9 1.6 - 2.4 mg/dL   PHOSPHORUS    Collection Time: 06/13/19  5:01 AM   Result Value Ref Range    Phosphorus 3.6 2.6 - 4.7 MG/DL   SED RATE (ESR)    Collection Time: 06/13/19  5:01 AM   Result Value Ref Range    Sed rate, automated 81 (H) 0 - 20 mm/hr   HEPATITIS C AB    Collection Time: 06/13/19  5:01 AM   Result Value Ref Range    Hep C  virus Ab Interp. NONREACTIVE NR      Hep C  virus Ab comment Method used is Siemens Advia Centaur     HIV 1/2 AG/AB, 4TH GENERATION,W RFLX CONFIRM    Collection Time: 06/13/19  5:01 AM   Result Value Ref Range    HIV 1/2 Interpretation NONREACTIVE NR      HIV 1/2 result comment SEE NOTE     GLUCOSE, POC    Collection Time: 06/13/19  5:55 AM   Result Value Ref Range    Glucose (POC) 129 (H) 65 - 100 mg/dL    Performed by Spike Vega, TROUGH    Collection Time: 06/13/19  9:28 AM   Result Value Ref Range    Vancomycin,trough 11.3 (H) 5.0 - 10.0 ug/mL    Reported dose date: [id number]      Reported dose time: 2100      Reported dose: 750 MG UNITS   GLUCOSE, POC    Collection Time: 06/13/19 12:16 PM   Result Value Ref Range    Glucose (POC) 150 (H) 65 - 100 mg/dL    Performed by [address] [place] [address], POC    Collection Time: 06/13/19  6:33 PM   Result Value Ref Range    Glucose (POC) 175 (H) 65 - 100 mg/dL    Performed by Kinjal Kessler          Impression:   POD #1, Debridement of wound and bone right heel   Osteomyelitis right calcaneus      Recommendation:   Wound was packed with Nugauze moist with Dakins solution. Partial return of bleeding after removing the packing for dressing change. If the dressing becomes saturated, then it may be necessary to remove the NuGauze and reapply Surgi-foam. CBC already scheduled to be drawn in the AM. Otherwise the wound looks clean. I still anticipate another debridement and either a Wound VAC or antibiotic bead placement in a few days (Likely Monday).

## 2019-06-13 NOTE — PROGRESS NOTES
Dr. Torito Pat informed me that the night shift nurse could call him to get a verbal order for surgifoam packing from the OR if the dressing starts to bleed through.

## 2019-06-13 NOTE — PROGRESS NOTES
Infectious Disease Progress Note        Mr Hattie Foster seen  Tolerating IV antibiotics, denied GI upset, diarrhea , fever, chills, rash  Has pain in foot (5/10)      Current Facility-Administered Medications:     Vancomycin.  Please obtain a trough ~0830 before 0900 dose on 6/13, thanks , , Other, ONCE, Guillermina Pinzon DPM    oxyCODONE-acetaminophen (PERCOCET 7.5) 7.5-325 mg per tablet 1 Tab, 1 Tab, Oral, Q6H PRN, Guillermina Pinzon, DPM, 1 Tab at 06/13/19 0103    morphine injection 2 mg, 2 mg, IntraVENous, Q3H PRN, Guillermina Pinzon DPM    ondansetron (ZOFRAN) injection 4 mg, 4 mg, IntraVENous, Q4H PRN, Guillermina Pinzon, DPSHAWN    piperacillin-tazobactam (ZOSYN) 3.375 g in 0.9% sodium chloride (MBP/ADV) 100 mL, 3.375 g, IntraVENous, Q8H, Guillermina Pinzon DPM, Last Rate: 25 mL/hr at 06/13/19 0523, 3.375 g at 06/13/19 0523    sodium chloride (NS) flush 5-40 mL, 5-40 mL, IntraVENous, Q8H, Indra Pinzon DPM, 10 mL at 06/13/19 0556    sodium chloride (NS) flush 5-40 mL, 5-40 mL, IntraVENous, PRN, Guillermina Pinzon, DPSHAWN    ondansetron (ZOFRAN) injection 4 mg, 4 mg, IntraVENous, Q4H PRN, Guillermina Pinzon, DPSHAWN    insulin lispro (HUMALOG) injection, , SubCUTAneous, Q6H, Guillermina Pinzon DPM, Stopped at 06/13/19 0600    glucose chewable tablet 16 g, 4 Tab, Oral, PRN, Guillermina Pinzon, DPSHAWN    glucagon (GLUCAGEN) injection 1 mg, 1 mg, IntraMUSCular, PRN, Guillermina Pinzon, DPM    0.9% sodium chloride infusion, 75 mL/hr, IntraVENous, CONTINUOUS, Guillermina Pinzon DPSHAWN, Last Rate: 75 mL/hr at 06/12/19 2130, 75 mL/hr at 06/12/19 2130    aspirin delayed-release tablet 81 mg, 81 mg, Oral, DAILY, Guillermina Pinzon DPM, Stopped at 06/12/19 0843    vancomycin (VANCOCIN) 750 mg in 0.9% sodium chloride (MBP/ADV) 250 mL, 750 mg, IntraVENous, Q12H, Guillermina Pinzon DPM, 750 mg at 06/12/19 2235        Physical Exam:    Vitals:   Patient Vitals for the past 24 hrs:   Temp Pulse Resp BP SpO2   06/13/19 0756 98.2 °F (36.8 °C) 77 16 147/82 100 %   06/13/19 0031 98 °F (36.7 °C)  15 123/69 99 %   06/12/19 2245 98.5 °F (36.9 °C) 83 14 137/72 98 %   06/12/19 2215  76 12 129/65 99 %   06/12/19 2200 98 °F (36.7 °C) 78 12 133/65 97 %   06/12/19 2145  81 12 133/69 96 %   06/12/19 2130  80 18 120/53 96 %   06/12/19 2120  81 15 109/56 96 %   06/12/19 2115  78 13 114/60 97 %   06/12/19 2110  79 13 110/57 97 %   06/12/19 2106 97.6 °F (36.4 °C) 80 12 122/56 97 %   06/12/19 2105  80 13 109/58 97 %   06/12/19 1543 98.1 °F (36.7 °C) 67 18 185/78 100 %     · GEN: NAD  · HEENT:no scleral icterus,  no thrush  · CV: S1, S2 heard regularly  · Lungs: Clear to auscultation bilaterally  · Abdomen: soft, non distended, non tender,   · Extremities: no edema  · Neuro: Alert, oriented to self, place and situation, moves all extremities to commands, verbal   · Skin: no rash  MSK: + R foot post op dressing, some old blood noted on sheets   Labs:   Recent Results (from the past 24 hour(s))   GLUCOSE, POC    Collection Time: 06/12/19 11:56 AM   Result Value Ref Range    Glucose (POC) 118 (H) 65 - 100 mg/dL    Performed by Jono Rodrigues, POC    Collection Time: 06/12/19  5:41 PM   Result Value Ref Range    Glucose (POC) 76 65 - 100 mg/dL    Performed by Dayday De Santiago    GLUCOSE, POC    Collection Time: 06/12/19  6:21 PM   Result Value Ref Range    Glucose (POC) 152 (H) 65 - 100 mg/dL    Performed by BETSY Monsalve STAIN    Collection Time: 06/12/19  7:37 PM   Result Value Ref Range    Special Requests: NO SPECIAL REQUESTS      GRAM STAIN OCCASIONAL WBCS SEEN      GRAM STAIN        RARE APPARENT INTRACELLULAR GRAM POSITIVE COCCI IN PAIRS    Culture result: PENDING    GLUCOSE, POC    Collection Time: 06/12/19  9:18 PM   Result Value Ref Range    Glucose (POC) 144 (H) 65 - 100 mg/dL    Performed by PHANI Connolly    Collection Time: 06/13/19 12:33 AM   Result Value Ref Range    Glucose (POC) 147 (H) 65 - 100 mg/dL    Performed by Andrea Wolf (PCT) CBC WITH AUTOMATED DIFF    Collection Time: 06/13/19  5:01 AM   Result Value Ref Range    WBC 9.5 4.1 - 11.1 K/uL    RBC 3.64 (L) 4.10 - 5.70 M/uL    HGB 10.4 (L) 12.1 - 17.0 g/dL    HCT 32.7 (L) 36.6 - 50.3 %    MCV 89.8 80.0 - 99.0 FL    MCH 28.6 26.0 - 34.0 PG    MCHC 31.8 30.0 - 36.5 g/dL    RDW 12.7 11.5 - 14.5 %    PLATELET 900 187 - 506 K/uL    MPV 9.8 8.9 - 12.9 FL    NRBC 0.0 0  WBC    ABSOLUTE NRBC 0.00 0.00 - 0.01 K/uL    NEUTROPHILS 83 (H) 32 - 75 %    LYMPHOCYTES 8 (L) 12 - 49 %    MONOCYTES 8 5 - 13 %    EOSINOPHILS 0 0 - 7 %    BASOPHILS 0 0 - 1 %    IMMATURE GRANULOCYTES 1 (H) 0.0 - 0.5 %    ABS. NEUTROPHILS 7.8 1.8 - 8.0 K/UL    ABS. LYMPHOCYTES 0.8 0.8 - 3.5 K/UL    ABS. MONOCYTES 0.8 0.0 - 1.0 K/UL    ABS. EOSINOPHILS 0.0 0.0 - 0.4 K/UL    ABS. BASOPHILS 0.0 0.0 - 0.1 K/UL    ABS. IMM. GRANS. 0.1 (H) 0.00 - 0.04 K/UL    DF AUTOMATED      RBC COMMENTS NORMOCYTIC, NORMOCHROMIC     METABOLIC PANEL, BASIC    Collection Time: 06/13/19  5:01 AM   Result Value Ref Range    Sodium 138 136 - 145 mmol/L    Potassium 4.1 3.5 - 5.1 mmol/L    Chloride 108 97 - 108 mmol/L    CO2 25 21 - 32 mmol/L    Anion gap 5 5 - 15 mmol/L    Glucose 138 (H) 65 - 100 mg/dL    BUN 13 6 - 20 MG/DL    Creatinine 0.97 0.70 - 1.30 MG/DL    BUN/Creatinine ratio 13 12 - 20      GFR est AA >60 >60 ml/min/1.73m2    GFR est non-AA >60 >60 ml/min/1.73m2    Calcium 8.4 (L) 8.5 - 10.1 MG/DL   MAGNESIUM    Collection Time: 06/13/19  5:01 AM   Result Value Ref Range    Magnesium 1.9 1.6 - 2.4 mg/dL   PHOSPHORUS    Collection Time: 06/13/19  5:01 AM   Result Value Ref Range    Phosphorus 3.6 2.6 - 4.7 MG/DL   SED RATE (ESR)    Collection Time: 06/13/19  5:01 AM   Result Value Ref Range    Sed rate, automated 81 (H) 0 - 20 mm/hr   HEPATITIS C AB    Collection Time: 06/13/19  5:01 AM   Result Value Ref Range    Hep C  virus Ab Interp.  NONREACTIVE NR      Hep C  virus Ab comment Method used is Siemens Advia Centaur     HIV 1/2 AG/AB, 4TH GENERATION,W RFLX CONFIRM    Collection Time: 06/13/19  5:01 AM   Result Value Ref Range    HIV 1/2 Interpretation NONREACTIVE NR      HIV 1/2 result comment SEE NOTE     GLUCOSE, POC    Collection Time: 06/13/19  5:55 AM   Result Value Ref Range    Glucose (POC) 129 (H) 65 - 100 mg/dL    Performed by Sigrid Huntley        Microbiology Data:       Blood:6/11/19  Specimen Information: Blood        Component Value Ref Range & Units Status   Special Requests: NO SPECIAL REQUESTS    Preliminary   Culture result: NO GROWTH AFTER 9 HOURS    Preliminary     Wound foot  Foot         Component Value Ref Range & Units Status   Special Requests: NO SPECIAL REQUESTS    Final   GRAM STAIN OCCASIONAL WBCS SEEN    Final   GRAM STAIN 3+ GRAM NEGATIVE RODS    Final   Culture result: Abnormal     Final   MODERATE ENTEROCOCCUS FAECALIS GROUP D    Culture result: Abnormal     Final   LIGHT STREPTOCOCCI, BETA HEMOLYTIC GROUP B . Penicillin and ampicillin are drugs of choice for treatment of beta-hemolytic streptococcal infections. Susceptibility testing of penicillins and beta-lactams approved by the FDA for treatment of beta-hemolytic streptococcal infections need not be performed routinely, because nonsusceptible isolates are extremely rare.  CLSI 2012   Culture result: FEW PSEUDOMONAS AERUGINOSAAbnormal     Final   Susceptibility      Enterococcus  faecalis group D Pseudomonas aeruginosa     YASMINE YASMINE    Amikacin ($)   <=16 ug/mL S    Ampicillin ($) <=2 ug/mL S      Aztreonam ($$$$)   <=4 ug/mL S    Cefepime ($$)   <=4 ug/mL S    Ceftazidime ($)   <=1 ug/mL S    Ciprofloxacin ($)   <=1 ug/mL S    Daptomycin ($$$$$) 1 ug/mL S      Gentamicin ($)   <=4 ug/mL S    Imipenem   <=1 ug/mL S    Levofloxacin ($)   <=2 ug/mL S    Linezolid ($$$$$) 2 ug/mL S      Meropenem ($$)   <=1 ug/mL S    Penicillin G ($$) 2 ug/mL S      Piperacillin/Tazobac ($)   <=16 ug/mL S    Tobramycin ($)   <=4 ug/mL S    Vancomycin ($) 2 ug/mL S Urine:     Synovial/Joint fluid:     Sputum/BAL/Pleural:     Peritoneal:    Imaging:     MRI R foot   FINDINGS: Bone marrow: There is a small erosion of the posterior lateral  inferior calcaneus with underlying enhancement and edema and decreased T1 signal  consistent with acute osteomyelitis.     Joint fluid:  None.     Tendons: Intact.     Muscles: Mild edema in the musculature likely related to diabetic neuropathy.     Neurovascular bundles: Within normal limits.     Articular cartilage:Intact without focal osteochondral lesion.     Soft tissue mass: There is a soft tissue ulcer of the posterior lateral healed  extending to near to the calcaneus. There is nonspecific subcutaneous edema  surrounding the foot and ankle.     IMPRESSION  IMPRESSION:   Ulceration of the posterior lateral heel with acute osteomyelitis in the  posterior lateral inferior calcaneus.       DASIA 6/11/19  Interpretation Summary     · The right resting DASIA is normal.  · The left resting DASIA is mildly decreased. Arterial disease noted at the level of the femoral artery, popliteal and tibial artery on the left side. · Significant disease in the left pedial arch and digits. · The left TBI is decreased to 0.22. · The right TBI is 0.52. X ray R foot 6/8/19     FINDINGS: Three views of the right foot. There is ulceration of the plantar  aspect of the heel. Moderate degenerative changes are seen in the tibiotalar  joint. There is a pes planus deformity with mild degenerative changes in the  midfoot. There is a type II accessory navicular bone. There is a hallux valgus  deformity. No acute fracture or dislocation. No evidence of osteomyelitis. There  is mild soft tissue swelling of the dorsum of the foot.     IMPRESSION  IMPRESSION:   1. Ulceration of the plantar aspect of the heel without evidence of  osteomyelitis. 2. Mild soft tissue swelling of the dorsum of the foot. 3. Degenerative changes.       Assessment / Plan: Mr Iman Oglesby is a 68year old gentleman with hx of DM, long standing wound on R heel area admitted with necrotic wound and osteomyelitis. 1) R foot osteomyelitis:  S/P bone /wound R calcaneous on 6/12/19  Await cultures  Bone margins and pathology  Continue renally dosed Vancomycin and Zosyn IV   Vancomycin and Zosyn IV . Dosing per pharmacy, trough goal 15-20, with close renal monitoring   Ideally would have liked to get bone biopsies/cultures off antibiotics but already started and plan for OR this evening . Additionally podiatry notes indicate he had copious amounts discharge wt foul odor and also local erythema and edema of R foot   Antibiotic side effects including risk for nephrotoxicity, CDI, hematological, GI adverse effects explained   Final duration, choice and route of antibiotics to be determined   Probiotics/yogurt encouraged   Offloading pressure per Podiatry   Pain control per primary team       2) DM  A1C 7.9     3) Screening: HIV and Hep C recommended and he consented , tests pending     4) DVT px     Dr Gisela Walters covering on 6/14/19 and please contact weekend team if any acute issues arise over the next 3 days. Thank for the opportunity to participate in the care of this patient. Please contact with questions or concerns.        Rosetta Sosa,   10:27 AM

## 2019-06-13 NOTE — BRIEF OP NOTE
BRIEF OPERATIVE NOTE    Date of Procedure: 6/12/2019   Preoperative Diagnosis: RIGHT CALCANEAL OSTEOMYELITIS  Postoperative Diagnosis: RIGHT CALCANEAL OSTEOMYELITIS    Procedure(s):  DEBRIDEMENT RIGHT HEEL WOUND INCLUDING BONE  Surgeon(s) and Role:     * Eleno Pinzon DPM - Primary         Surgical Assistant: n/a    Surgical Staff:  Circ-1: Skyla Sharp  Circ-Relief: Eva Rodriguez RN  Scrub Tech-1: Collette Phy  Event Time In Time Out   Incision Start 06/12/2019 1924    Incision Close 06/12/2019 2056      Anesthesia: General   Estimated Blood Loss: 50 ml  Specimens:   ID Type Source Tests Collected by Time Destination   1 : Right Calcaneal Bone Biopsy (w/ stitch markings*) [facility] 6/12/2019 1939 Pathology   2 : Right Calcaneal Bone Margin Preservative Heel  [facility] 6/12/2019 2018 Pathology   1 : Right Calcaneal Bone Anaerobic Culture Wound Heel CULTURE, ANAEROBIC [facility] 6/12/2019 1927 Microbiology   2 : Right Calcaneal Bone Aerobic Culture & Gram Stain Wound Heel CULTURE, WOUND W GRAM STAIN [facility] 6/12/2019 1937 Microbiology   3 : Right Calcaneal Bone Fungal Culture Wound Heel CULTURE, FUNGUS [facility] 6/12/2019 1938 Microbiology   4 : Right Calcaneal Bone AFB Culture Wound Heel AFB CULTURE [facility] 6/12/2019 1938 Microbiology      Findings: Necrotic adipose tissue lateral plantar right heel; friable bone lateral right calcaneus   Complications: N/A  Implants: * No implants in log *

## 2019-06-13 NOTE — PERIOP NOTES
TRANSFER - OUT REPORT:    Verbal report given to Mliss Hodgkin RN(name) on Mary Stager  being transferred to 1136(unit) for routine progression of care       Report consisted of patients Situation, Background, Assessment and   Recommendations(SBAR). Information from the following report(s) OR Summary, Intake/Output and MAR was reviewed with the receiving nurse. Opportunity for questions and clarification was provided.       Patient transported with:   O2 @ 2 liters  Registered Nurse

## 2019-06-13 NOTE — PROGRESS NOTES
Oncology End of Shift Note      Bedside shift change report given to Geovanny Nash RN (incoming nurse) by Pillo Arroyo (outgoing nurse) on Maritza Rno. Report included the following information SBAR, Kardex, Intake/Output, MAR and Recent Results. Shift Summary: pt came back from surgery at 2230. Dressing to R foot clean dry and intact. Gave 1 dose of percocet for pain of 6. Labs drawn. No other issues. Issues for Physician to Address:  none     Patient on Cardiac Monitoring?     [] Yes  [x] No    Rhythm:          Shift Events  See above      Pillo Arroyo

## 2019-06-13 NOTE — ANESTHESIA POSTPROCEDURE EVALUATION
Procedure(s):  DEBRIDEMENT RIGHT HEEL WITH BONE.    general    Anesthesia Post Evaluation        Patient location during evaluation: PACU  Note status: Adequate. Level of consciousness: responsive to verbal stimuli and sleepy but conscious  Pain management: satisfactory to patient  Airway patency: patent  Anesthetic complications: no  Cardiovascular status: acceptable  Respiratory status: acceptable  Hydration status: acceptable  Comments: +Post-Anesthesia Evaluation and Assessment    Patient: Maris Villar MRN: [id number]  SSN: [id number]   YOB: 1950  Age: 68 y.o. Sex: male      Cardiovascular Function/Vital Signs    /53   Pulse 80   Temp 36.4 °C (97.6 °F)   Resp 18   Ht 6' (1.829 m)   Wt 80.3 kg (177 lb)   SpO2 96%   BMI 24.01 kg/m²     Patient is status post Procedure(s):  DEBRIDEMENT RIGHT HEEL WITH BONE. Nausea/Vomiting: Controlled. Postoperative hydration reviewed and adequate. Pain:  Pain Scale 1: Visual (06/12/19 2106)  Pain Intensity 1: 0 (06/12/19 2106)   Managed. Neurological Status:   Neuro (WDL): Exceptions to WDL (06/12/19 2106)   At baseline. Mental Status and Level of Consciousness: Arousable. Pulmonary Status:   O2 Device: Nasal cannula (06/12/19 2106)   Adequate oxygenation and airway patent. Complications related to anesthesia: None    Post-anesthesia assessment completed. No concerns. Signed By: Florina Davey MD    6/12/2019  Post anesthesia nausea and vomiting:  controlled      Vitals Value Taken Time   /53 6/12/2019  9:30 PM   Temp 36.4 °C (97.6 °F) 6/12/2019  9:06 PM   Pulse 82 6/12/2019  9:31 PM   Resp 20 6/12/2019  9:31 PM   SpO2 98 % 6/12/2019  9:31 PM   Vitals shown include unvalidated device data.

## 2019-06-13 NOTE — OP NOTES
[place]  OPERATIVE REPORT    Name:  Sara Silva  MR#:  [id number]  :  1950  ACCOUNT #:  [de-identified]  DATE OF SERVICE:  2019    PREOPERATIVE DIAGNOSIS:  Osteomyelitis, right calcaneus. POSTOPERATIVE DIAGNOSIS:  Osteomyelitis, right calcaneus. PROCEDURE PERFORMED:  Debridement of wound including bone, right calcaneus. SURGEON:  Wai Geiger DPM    ASSISTANT:  n/a    ANESTHESIA:  General with right ankle block. COMPLICATIONS:  None. SPECIMENS REMOVED:  Aerobic and anaerobic bone cultures; fungal bone culture; AFB bone culture; specimen of osteomyelitis, right calcaneus; and specimen of bone margin, right calcaneus. IMPLANTS:  None. ESTIMATED BLOOD LOSS:  50 mL    PROCEDURE IN DETAIL:  The patient was brought into the operating room and placed supine upon the OR table. After administration of general anesthesia, the right lower extremity was prepped and draped in the usual sterile technique. Right lower extremity was elevated for 3 minutes before the inflation of the tourniquet around the right calf to a pressure of 330 mmHg. I then injected the right ankle with 18 mL of 0.5% plain Marcaine in the form of an ankle block. Attention was directed to the wound on the lateral right heel. It had a foul odor, a wet necrotic base, and probed to bone of the calcaneal tuberosity. A #15 blade was used to make a circular incision around the main opening to bone and this was excised full skin thickness to the calcaneal bone. This portion of tissue was discarded. A small rongeur was used to take several samples of the exposed and discolored bone of lateral right calcaneus and was sent for aerobic and anaerobic, Gram stain, AFB, and fungal culture. A pair of curved Metzenbaum scissors was used to release soft tissue attachment to the calcaneal tuberosity.   A power micro sagittal saw was then used to resect the right calcaneal tuberosity parallel to the weightbearing surface from lateral to medial.  Additional soft tissue attachments were released with the curved Metzenbaum scissors. A silk suture was tied to a small amount of soft tissue attachment on the lateral aspect of the calcaneal specimen to help orientation for the pathologist.  The whole calcaneal tuberosity was sent to Pathology for examination. The power micro sagittal saw was then used to take an additional wafer of bone from the remaining calcaneus at the lateral margin. This will be labeled as bone margin. The surgical site was then further debrided off necrotic soft tissue, mainly adipose tissue, from the plantar lateral heel area. The site was flushed well with 3 L of sterile saline with 50,000 units of bacitracin mixed in using a power lavage system. The tourniquet was released and there was brisk bleeding from all tissue surfaces from the interior of the wound, which were cauterized. There was no pulsatile flow from any vessels. The tourniquet was elevated again and the wound was packed with a combination of Surgicel and Surgifoam.  After approximately 20 minutes, the tourniquet was released and there was an acceptable amount of light bleeding from the wound. The foot was then cleaned with saline and wrapped in dry sterile dressings with Adaptic over the surgical incision. The patient was transported to the recovery room with vital signs stable and vascular status intact, right foot. He will remain until postanesthesia protocol is met and then he will be transferred back to his hospital bed. I will continue to follow while the patient remains an inpatient. The Lovenox should be held for 1-2 days to be certain of adequate hemostasis.         Ayaan Chin DPM [id number]  D:  06/12/2019 21:14  T:  06/12/2019 21:21  JOB #:  [id number]

## 2019-06-14 LAB
ANION GAP SERPL CALC-SCNC: 6 MMOL/L (ref 5–15)
BASOPHILS # BLD: 0 K/UL (ref 0–0.1)
BASOPHILS NFR BLD: 1 % (ref 0–1)
BUN SERPL-MCNC: 11 MG/DL (ref 6–20)
BUN/CREAT SERPL: 11 (ref 12–20)
CALCIUM SERPL-MCNC: 8.4 MG/DL (ref 8.5–10.1)
CHLORIDE SERPL-SCNC: 106 MMOL/L (ref 97–108)
CO2 SERPL-SCNC: 26 MMOL/L (ref 21–32)
CREAT SERPL-MCNC: 1.03 MG/DL (ref 0.7–1.3)
DIFFERENTIAL METHOD BLD: ABNORMAL
EOSINOPHIL # BLD: 0.4 K/UL (ref 0–0.4)
EOSINOPHIL NFR BLD: 6 % (ref 0–7)
ERYTHROCYTE [DISTWIDTH] IN BLOOD BY AUTOMATED COUNT: 12.8 % (ref 11.5–14.5)
GLUCOSE BLD STRIP.AUTO-MCNC: 105 MG/DL (ref 65–100)
GLUCOSE BLD STRIP.AUTO-MCNC: 124 MG/DL (ref 65–100)
GLUCOSE BLD STRIP.AUTO-MCNC: 132 MG/DL (ref 65–100)
GLUCOSE BLD STRIP.AUTO-MCNC: 142 MG/DL (ref 65–100)
GLUCOSE BLD STRIP.AUTO-MCNC: 166 MG/DL (ref 65–100)
GLUCOSE SERPL-MCNC: 121 MG/DL (ref 65–100)
HCT VFR BLD AUTO: 31.3 % (ref 36.6–50.3)
HGB BLD-MCNC: 9.9 G/DL (ref 12.1–17)
IMM GRANULOCYTES # BLD AUTO: 0 K/UL (ref 0–0.04)
IMM GRANULOCYTES NFR BLD AUTO: 0 % (ref 0–0.5)
LYMPHOCYTES # BLD: 1.4 K/UL (ref 0.8–3.5)
LYMPHOCYTES NFR BLD: 19 % (ref 12–49)
MAGNESIUM SERPL-MCNC: 2 MG/DL (ref 1.6–2.4)
MCH RBC QN AUTO: 28.4 PG (ref 26–34)
MCHC RBC AUTO-ENTMCNC: 31.6 G/DL (ref 30–36.5)
MCV RBC AUTO: 89.9 FL (ref 80–99)
MONOCYTES # BLD: 1 K/UL (ref 0–1)
MONOCYTES NFR BLD: 13 % (ref 5–13)
NEUTS SEG # BLD: 4.6 K/UL (ref 1.8–8)
NEUTS SEG NFR BLD: 61 % (ref 32–75)
NRBC # BLD: 0 K/UL (ref 0–0.01)
NRBC BLD-RTO: 0 PER 100 WBC
PLATELET # BLD AUTO: 264 K/UL (ref 150–400)
PMV BLD AUTO: 9.9 FL (ref 8.9–12.9)
POTASSIUM SERPL-SCNC: 3.7 MMOL/L (ref 3.5–5.1)
RBC # BLD AUTO: 3.48 M/UL (ref 4.1–5.7)
SERVICE CMNT-IMP: ABNORMAL
SODIUM SERPL-SCNC: 138 MMOL/L (ref 136–145)
WBC # BLD AUTO: 7.5 K/UL (ref 4.1–11.1)

## 2019-06-14 PROCEDURE — 77010033678 HC OXYGEN DAILY

## 2019-06-14 PROCEDURE — 94760 N-INVAS EAR/PLS OXIMETRY 1: CPT

## 2019-06-14 PROCEDURE — 36415 COLL VENOUS BLD VENIPUNCTURE: CPT

## 2019-06-14 PROCEDURE — 74011636637 HC RX REV CODE- 636/637: Performed by: PODIATRIST

## 2019-06-14 PROCEDURE — 74011250636 HC RX REV CODE- 250/636: Performed by: INTERNAL MEDICINE

## 2019-06-14 PROCEDURE — 80048 BASIC METABOLIC PNL TOTAL CA: CPT

## 2019-06-14 PROCEDURE — 85025 COMPLETE CBC W/AUTO DIFF WBC: CPT

## 2019-06-14 PROCEDURE — 74011000258 HC RX REV CODE- 258: Performed by: PODIATRIST

## 2019-06-14 PROCEDURE — 74011250636 HC RX REV CODE- 250/636: Performed by: PODIATRIST

## 2019-06-14 PROCEDURE — 74011250637 HC RX REV CODE- 250/637: Performed by: PODIATRIST

## 2019-06-14 PROCEDURE — 74011250637 HC RX REV CODE- 250/637: Performed by: EMERGENCY MEDICINE

## 2019-06-14 PROCEDURE — 74011250636 HC RX REV CODE- 250/636: Performed by: EMERGENCY MEDICINE

## 2019-06-14 PROCEDURE — 83735 ASSAY OF MAGNESIUM: CPT

## 2019-06-14 PROCEDURE — 65270000015 HC RM PRIVATE ONCOLOGY

## 2019-06-14 PROCEDURE — 82962 GLUCOSE BLOOD TEST: CPT

## 2019-06-14 RX ORDER — HEPARIN SODIUM 5000 [USP'U]/ML
5000 INJECTION, SOLUTION INTRAVENOUS; SUBCUTANEOUS EVERY 8 HOURS
Status: COMPLETED | OUTPATIENT
Start: 2019-06-14 | End: 2019-06-16

## 2019-06-14 RX ORDER — ENOXAPARIN SODIUM 100 MG/ML
40 INJECTION SUBCUTANEOUS
Status: DISCONTINUED | OUTPATIENT
Start: 2019-06-15 | End: 2019-06-14

## 2019-06-14 RX ADMIN — VANCOMYCIN HYDROCHLORIDE 1250 MG: 10 INJECTION, POWDER, LYOPHILIZED, FOR SOLUTION INTRAVENOUS at 21:43

## 2019-06-14 RX ADMIN — ASPIRIN 81 MG: 81 TABLET, COATED ORAL at 08:18

## 2019-06-14 RX ADMIN — INSULIN LISPRO 2 UNITS: 100 INJECTION, SOLUTION INTRAVENOUS; SUBCUTANEOUS at 12:02

## 2019-06-14 RX ADMIN — OXYCODONE HYDROCHLORIDE AND ACETAMINOPHEN 1 TABLET: 7.5; 325 TABLET ORAL at 20:09

## 2019-06-14 RX ADMIN — Medication 10 ML: at 06:00

## 2019-06-14 RX ADMIN — Medication 1 CAPSULE: at 08:18

## 2019-06-14 RX ADMIN — PIPERACILLIN SODIUM,TAZOBACTAM SODIUM 3.38 G: 3; .375 INJECTION, POWDER, FOR SOLUTION INTRAVENOUS at 05:12

## 2019-06-14 RX ADMIN — VANCOMYCIN HYDROCHLORIDE 1250 MG: 10 INJECTION, POWDER, LYOPHILIZED, FOR SOLUTION INTRAVENOUS at 10:14

## 2019-06-14 RX ADMIN — HEPARIN SODIUM 5000 UNITS: 5000 INJECTION INTRAVENOUS; SUBCUTANEOUS at 15:04

## 2019-06-14 RX ADMIN — Medication 10 ML: at 23:19

## 2019-06-14 RX ADMIN — PIPERACILLIN SODIUM,TAZOBACTAM SODIUM 3.38 G: 3; .375 INJECTION, POWDER, FOR SOLUTION INTRAVENOUS at 13:06

## 2019-06-14 RX ADMIN — HEPARIN SODIUM 5000 UNITS: 5000 INJECTION INTRAVENOUS; SUBCUTANEOUS at 23:18

## 2019-06-14 RX ADMIN — PIPERACILLIN SODIUM,TAZOBACTAM SODIUM 3.38 G: 3; .375 INJECTION, POWDER, FOR SOLUTION INTRAVENOUS at 20:16

## 2019-06-14 RX ADMIN — INSULIN LISPRO 2 UNITS: 100 INJECTION, SOLUTION INTRAVENOUS; SUBCUTANEOUS at 23:22

## 2019-06-14 RX ADMIN — Medication 10 ML: at 14:00

## 2019-06-14 NOTE — PROGRESS NOTES
Podiatry    Subjective: C/o moderate pain right foot, but has not asked for pain medication today. Patient is a 68 y.o.  male who is being seen for osteomyelitis right calcaneus. Patient Active Problem List    Diagnosis Date Noted    Diabetic foot ulcer ([facility] 2019    [facility]    Controlled type 2 diabetes mellitus with skin complication, without long-term current use of insulin ([facility] 2019     Past Medical History:   Diagnosis Date    Diabetes ([facility]     DVT (deep venous thrombosis) ([facility] 2019    Right leg stent placed    Thromboembolus [facility])      History reviewed. No pertinent surgical history. History reviewed. No pertinent family history. Social History     Tobacco Use    Smoking status: Never Smoker    Smokeless tobacco: Never Used   Substance Use Topics    Alcohol use: Yes     Alcohol/week: 3.6 oz     Types: 6 Cans of beer per week     Comment: occ     No Known Allergies  Prior to Admission medications    Medication Sig Start Date End Date Taking? Authorizing Provider   metFORMIN (GLUCOPHAGE) 1,000 mg tablet Take 1,000 mg by mouth daily. Other, MD Simón       Review of Systems AO x3. NAD. Objective:     Patient Vitals for the past 8 hrs:   BP Temp Pulse Resp SpO2   19 1523 154/65 99.6 °F (37.6 °C) 74 16 100 %     Temp (24hrs), Av.9 °F (37.2 °C), Min:98.2 °F (36.8 °C), Max:99.6 °F (37.6 °C)      Physical Exam Lower Extremity  Reduced erythema and edema right foot. No active bleeding during dressing change. Interior of wound is viable tissue.   DP and PT palpable right      Data Review:   Recent Results (from the past 24 hour(s))   GLUCOSE, POC    Collection Time: 19 12:00 AM   Result Value Ref Range    Glucose (POC) 124 (H) 65 - 100 mg/dL    Performed by Phyllis Adams    CBC WITH AUTOMATED DIFF    Collection Time: 19  4:52 AM   Result Value Ref Range    WBC 7.5 4.1 - 11.1 K/uL    RBC 3.48 (L) 4.10 - 5.70 M/uL    HGB 9.9 (L) 12.1 - 17.0 g/dL    HCT 31.3 (L) 36.6 - 50.3 %    MCV 89.9 80.0 - 99.0 FL    MCH 28.4 26.0 - 34.0 PG    MCHC 31.6 30.0 - 36.5 g/dL    RDW 12.8 11.5 - 14.5 %    PLATELET 419 111 - 189 K/uL    MPV 9.9 8.9 - 12.9 FL    NRBC 0.0 0  WBC    ABSOLUTE NRBC 0.00 0.00 - 0.01 K/uL    NEUTROPHILS 61 32 - 75 %    LYMPHOCYTES 19 12 - 49 %    MONOCYTES 13 5 - 13 %    EOSINOPHILS 6 0 - 7 %    BASOPHILS 1 0 - 1 %    IMMATURE GRANULOCYTES 0 0.0 - 0.5 %    ABS. NEUTROPHILS 4.6 1.8 - 8.0 K/UL    ABS. LYMPHOCYTES 1.4 0.8 - 3.5 K/UL    ABS. MONOCYTES 1.0 0.0 - 1.0 K/UL    ABS. EOSINOPHILS 0.4 0.0 - 0.4 K/UL    ABS. BASOPHILS 0.0 0.0 - 0.1 K/UL    ABS. IMM. GRANS. 0.0 0.00 - 0.04 K/UL    DF AUTOMATED     METABOLIC PANEL, BASIC    Collection Time: 06/14/19  4:52 AM   Result Value Ref Range    Sodium 138 136 - 145 mmol/L    Potassium 3.7 3.5 - 5.1 mmol/L    Chloride 106 97 - 108 mmol/L    CO2 26 21 - 32 mmol/L    Anion gap 6 5 - 15 mmol/L    Glucose 121 (H) 65 - 100 mg/dL    BUN 11 6 - 20 MG/DL    Creatinine 1.03 0.70 - 1.30 MG/DL    BUN/Creatinine ratio 11 (L) 12 - 20      GFR est AA >60 >60 ml/min/1.73m2    GFR est non-AA >60 >60 ml/min/1.73m2    Calcium 8.4 (L) 8.5 - 10.1 MG/DL   MAGNESIUM    Collection Time: 06/14/19  4:52 AM   Result Value Ref Range    Magnesium 2.0 1.6 - 2.4 mg/dL   GLUCOSE, POC    Collection Time: 06/14/19  5:57 AM   Result Value Ref Range    Glucose (POC) 105 (H) 65 - 100 mg/dL    Performed by Therese Teran    GLUCOSE, POC    Collection Time: 06/14/19 11:54 AM   Result Value Ref Range    Glucose (POC) 166 (H) 65 - 100 mg/dL    Performed by Travador    GLUCOSE, POC    Collection Time: 06/14/19  6:04 PM   Result Value Ref Range    Glucose (POC) 132 (H) 65 - 100 mg/dL    Performed by Travador          Impression:   Osteomyelitis right calcaneus, S/P bone debridement      Recommendation:   Bleeding has stopped.  OK for heparin and PT, though avoid weight bearing right heel. Dressings changed. Will need another debridement, likely Monday.

## 2019-06-14 NOTE — PROGRESS NOTES
Bedside shift change report given to Sabra Adamson (oncoming nurse) by Jay Sharma (offgoing nurse). Report included the following information SBAR, Kardex, Procedure Summary, Intake/Output, MAR, Accordion, Recent Results and Med Rec Status.

## 2019-06-14 NOTE — PROGRESS NOTES
Oncology End of Shift Note      Bedside shift change report given to Kenny Marion RN (incoming nurse) by Remy Llanes (outgoing nurse) on [address]. Report included the following information SBAR, Kardex, Intake/Output, MAR and Recent Results. Shift Summary: labs drawn. IV abx hung. IV still patent. Wound care done by Dr Luis A Hughes clean dry and intact. Percocet x1 for pain. Issues for Physician to Address:  none     Patient on Cardiac Monitoring?     [] Yes  [x] No    Rhythm:          Shift Events  See above      Remy Llanes

## 2019-06-14 NOTE — PROGRESS NOTES
Problem: Falls - Risk of  Goal: *Absence of Falls  Description  Document Bishop Sands Fall Risk and appropriate interventions in the flowsheet. Outcome: Progressing Towards Goal     Problem: Diabetes Self-Management  Goal: *Disease process and treatment process  Description  Define diabetes and identify own type of diabetes; list 3 options for treating diabetes. Outcome: Progressing Towards Goal    Pt did not fall during shift and we discussed the disease and tx process of diabetes.

## 2019-06-14 NOTE — PROGRESS NOTES
Hospitalist Progress Note    NAME: Isabell Sosa   :  1950   MRN:  [id number]       Assessment / Plan:    Acute Osteomyelitis of R Foot POA  R foot Cellulitis POA  R Diabetic Foot Ulcer- non healing POA  Newly diagnosed DM type 2 POA- in 2019  PAD s/p R leg stent at [place] per pt in 2019   Recent Wound Cx- growing Pseudomonas & enterococcal sp  MRI R foot noted- Acute OM of the calcaneum     Cont on medical/surgical bed  IVF, stop good pos  IV abx- vanc + zosyn  Follow Blood Cx and wound cx/OR specimens- growing GNRs  IP Podiatry consulted-s/p I&D   IP ID consulted by podiatry- appreciate eval  FS Q AC & HS when eating, hold metformin, follow with ssi, a1c 7.9  Cont home ASA daily  May need picc/IV abx ? Rehab at [place], lives alone  Wean o2 off, no sxs, further w/u if documented hypoxia    CKD 2-3  Stable, follow    Mild chronic anemia  -follow           Code Status: Full  Surrogate Decision Maker: friend Natalie Ames # [phone]     DVT Prophylaxis: Sq lovenox resume if ok with podiatry  GI Prophylaxis: not indicated  Baseline: Pt lives with a friend, ambulatory    18.5 - 24.9 Normal weight / Body mass index is 24.01 kg/m². Recommended Disposition: TBD     Subjective:     Chief Complaint / Reason for Physician Visit  Foul smelling foot ulcer     Patient reports some tingling in his right foot but no pain and otherwise feels well. Normal appetite, no shortness of breath no chest pain. Plans are to go to the OR later today like to try to save his foot rather than amputation. He lives alone at home we discussed he may need IV antibiotics and rehab at discharge.  pt w/o c/os. No pain in foot. No sob/cp. On  since returning from OR     pt w/o c/os. Patient was evaluated at 10:45 AM        Discussed with RN events overnight.      Review of Systems:  Symptom Y/N Comments  Symptom Y/N Comments   Fever/Chills    Chest Pain n    Poor Appetite n   Edema     Cough n Abdominal Pain n    Sputum    Joint Pain     SOB/BLACK n   Pruritis/Rash     Nausea/vomit n   Tolerating PT/OT     Diarrhea    Tolerating Diet y    Constipation    Other       Could NOT obtain due to:      Objective:     VITALS:   Last 24hrs VS reviewed since prior progress note. Most recent are:  Patient Vitals for the past 24 hrs:   Temp Pulse Resp BP SpO2   06/14/19 0713 99 °F (37.2 °C) 70 16 171/73 100 %   06/13/19 2247 98.2 °F (36.8 °C) 76 16 132/72 100 %   06/13/19 1920 98.2 °F (36.8 °C) 75 16 133/69 100 %   06/13/19 1528 98 °F (36.7 °C) 73 16 136/67 100 %       Intake/Output Summary (Last 24 hours) at 6/14/2019 1219  Last data filed at 6/14/2019 0521  Gross per 24 hour   Intake    Output 1125 ml   Net -1125 ml        PHYSICAL EXAM:  Patient is awake and alert oriented x3 onNC no distress nontoxic-appearing. Conjunctiva pink mucous membranes moist.  Cardiovascular regular rate slight systolic murmur no rubs or gallops. Lungs are clear no wheezes rhonchi or crackles. Abdomen bowel sounds present soft nontender extremities no clubbing cyanosis or edema his right foot is dressed       Reviewed most current lab test results and cultures  YES  Reviewed most current radiology test results   YES  Review and summation of old records today    NO  Reviewed patient's current orders and MAR    YES  PMH/ reviewed - no change compared to H&P  ________________________________________________________________________  Care Plan discussed with:    Comments   Patient y    Family      RN y    Care Manager     Consultant                        Multidiciplinary team rounds were held today with , nursing, pharmacist and clinical coordinator. Patient's plan of care was discussed; medications were reviewed and discharge planning was addressed.      ________________________________________________________________________  Total NON critical care TIME:   Minutes    Total CRITICAL CARE TIME Spent:   Minutes non procedure based      Comments   >50% of visit spent in counseling and coordination of care     ________________________________________________________________________  Aleida Cespedes MD     Procedures: see electronic medical records for all procedures/Xrays and details which were not copied into this note but were reviewed prior to creation of Plan. LABS:  I reviewed today's most current labs and imaging studies.   Pertinent labs include:  Recent Labs     06/14/19  0452 06/13/19  0501 06/12/19  0523   WBC 7.5 9.5 4.6   HGB 9.9* 10.4* 9.9*   HCT 31.3* 32.7* 31.0*    302 282     Recent Labs     06/14/19  0452 06/13/19  0501 06/12/19  0523    138 140   K 3.7 4.1 3.7    108 110*   CO2 26 25 26   * 138* 98   BUN 11 13 18   CREA 1.03 0.97 1.06   CA 8.4* 8.4* 8.3*   MG 2.0 1.9  --    PHOS  --  3.6  --        Signed: Aleida Cespedes MD

## 2019-06-14 NOTE — PROGRESS NOTES
MARSHALL:     A) Inpatient rehab       13:55 pm, Outbound call to [facility]). Spoke to \"Julissa\". Inquired if [facility]. SW informed \"no, we don't. The patient will need to bring in a paper script. If the patient is being discharged on IV-Antibiotics the orders for [facility] and IV-Antibiotics must be faxed over to the [facility]). Fax# [phone]. Our dept will forward this information to the patient's PCP here\". Vinod Gonzalez, MSW  [phone]      Met w/patient to inquire where he retrieves his medications from. Pt voiced he only uses the [place]. Asked patient if he'd be agreeable to inpatient rehab pt voiced \"yes\". Freedom of Choice to be offered, and referral to be sent.       Vinod Gonzalez, MSW  [phone]

## 2019-06-14 NOTE — PROGRESS NOTES
Oncology End of Shift Note      Bedside shift change report given to Rich Joyce RN (incoming nurse) by Vicky Gross (outgoing nurse) on [address]. Report included the following information SBAR, Kardex, Intake/Output and MAR. Shift Summary: Pt remains on bed rest due to heavy bleeding during surgery per doctor. Pt started on heparin today. Issues for Physician to Address:       Patient on Cardiac Monitoring?     [] Yes  [x] No    Rhythm:            Vicky Gross

## 2019-06-15 LAB
BACTERIA SPEC CULT: ABNORMAL
GLUCOSE BLD STRIP.AUTO-MCNC: 100 MG/DL (ref 65–100)
GLUCOSE BLD STRIP.AUTO-MCNC: 125 MG/DL (ref 65–100)
GLUCOSE BLD STRIP.AUTO-MCNC: 160 MG/DL (ref 65–100)
GRAM STN SPEC: ABNORMAL
GRAM STN SPEC: ABNORMAL
SERVICE CMNT-IMP: ABNORMAL
SERVICE CMNT-IMP: NORMAL

## 2019-06-15 PROCEDURE — 97161 PT EVAL LOW COMPLEX 20 MIN: CPT | Performed by: PHYSICAL THERAPIST

## 2019-06-15 PROCEDURE — 94760 N-INVAS EAR/PLS OXIMETRY 1: CPT

## 2019-06-15 PROCEDURE — 74011250637 HC RX REV CODE- 250/637: Performed by: EMERGENCY MEDICINE

## 2019-06-15 PROCEDURE — 65270000015 HC RM PRIVATE ONCOLOGY

## 2019-06-15 PROCEDURE — 97116 GAIT TRAINING THERAPY: CPT | Performed by: PHYSICAL THERAPIST

## 2019-06-15 PROCEDURE — 74011250636 HC RX REV CODE- 250/636: Performed by: INTERNAL MEDICINE

## 2019-06-15 PROCEDURE — 74011250637 HC RX REV CODE- 250/637: Performed by: PODIATRIST

## 2019-06-15 PROCEDURE — 74011250636 HC RX REV CODE- 250/636: Performed by: PODIATRIST

## 2019-06-15 PROCEDURE — 74011636637 HC RX REV CODE- 636/637: Performed by: PODIATRIST

## 2019-06-15 PROCEDURE — 74011000258 HC RX REV CODE- 258: Performed by: PODIATRIST

## 2019-06-15 PROCEDURE — 82962 GLUCOSE BLOOD TEST: CPT

## 2019-06-15 PROCEDURE — 74011250636 HC RX REV CODE- 250/636: Performed by: EMERGENCY MEDICINE

## 2019-06-15 RX ADMIN — OXYCODONE HYDROCHLORIDE AND ACETAMINOPHEN 1 TABLET: 7.5; 325 TABLET ORAL at 17:58

## 2019-06-15 RX ADMIN — Medication 10 ML: at 21:01

## 2019-06-15 RX ADMIN — Medication 1 CAPSULE: at 09:59

## 2019-06-15 RX ADMIN — OXYCODONE HYDROCHLORIDE AND ACETAMINOPHEN 1 TABLET: 7.5; 325 TABLET ORAL at 09:59

## 2019-06-15 RX ADMIN — INSULIN LISPRO 2 UNITS: 100 INJECTION, SOLUTION INTRAVENOUS; SUBCUTANEOUS at 18:55

## 2019-06-15 RX ADMIN — Medication 10 ML: at 05:26

## 2019-06-15 RX ADMIN — HEPARIN SODIUM 5000 UNITS: 5000 INJECTION INTRAVENOUS; SUBCUTANEOUS at 15:33

## 2019-06-15 RX ADMIN — ASPIRIN 81 MG: 81 TABLET, COATED ORAL at 09:59

## 2019-06-15 RX ADMIN — HEPARIN SODIUM 5000 UNITS: 5000 INJECTION INTRAVENOUS; SUBCUTANEOUS at 09:59

## 2019-06-15 RX ADMIN — VANCOMYCIN HYDROCHLORIDE 1250 MG: 10 INJECTION, POWDER, LYOPHILIZED, FOR SOLUTION INTRAVENOUS at 10:05

## 2019-06-15 RX ADMIN — PIPERACILLIN SODIUM,TAZOBACTAM SODIUM 3.38 G: 3; .375 INJECTION, POWDER, FOR SOLUTION INTRAVENOUS at 23:05

## 2019-06-15 RX ADMIN — PIPERACILLIN SODIUM,TAZOBACTAM SODIUM 3.38 G: 3; .375 INJECTION, POWDER, FOR SOLUTION INTRAVENOUS at 05:26

## 2019-06-15 RX ADMIN — HEPARIN SODIUM 5000 UNITS: 5000 INJECTION INTRAVENOUS; SUBCUTANEOUS at 23:05

## 2019-06-15 RX ADMIN — PIPERACILLIN SODIUM,TAZOBACTAM SODIUM 3.38 G: 3; .375 INJECTION, POWDER, FOR SOLUTION INTRAVENOUS at 13:54

## 2019-06-15 RX ADMIN — VANCOMYCIN HYDROCHLORIDE 1250 MG: 10 INJECTION, POWDER, LYOPHILIZED, FOR SOLUTION INTRAVENOUS at 20:57

## 2019-06-15 NOTE — PROGRESS NOTES
Oncology End of Shift Note      Bedside shift change report given to Doretha Jimenez RN (incoming nurse) by Mary Anderson (outgoing nurse) on [facility]. Report included the following information SBAR, Kardex, Intake/Output, MAR and Recent Results. Shift Summary: wound care done by Dr Torito Pat at 8472. Dressings clean dry and intact. Percocet given x1. Iv abx given. No labs      Issues for Physician to Address:  none     Patient on Cardiac Monitoring?     [] Yes  [x] No    Rhythm:          Shift Events  See above      Mary Anderson

## 2019-06-15 NOTE — PROGRESS NOTES
Problem: Mobility Impaired (Adult and Pediatric)  Goal: *Acute Goals and Plan of Care (Insert Text)  Description  Physical Therapy Goals  Initiated 6/15/2019  1. Patient will move from supine to sit and sit to supine , scoot up and down and roll side to side in bed with independence within 7 day(s). 2.  Patient will transfer from bed to chair and chair to bed with modified independence using the least restrictive device within 7 day(s). 3.  Patient will perform sit to stand with modified independence within 7 day(s). 4.  Patient will ambulate with modified independence for 200 feet with the least restrictive device within 7 day(s). 5.  Patient will ascend/descend 12 stairs with 1 handrail(s) with modified independence within 7 day(s). Outcome: Not Met   PHYSICAL THERAPY EVALUATION  Patient: Isabell Sosa (88 y.o. male)  Date: 6/15/2019  Primary Diagnosis: Acute osteomyelitis of right foot ([facility] [M86.171]  Di[facility] [E11.621, L97.509]  Procedure(s) (LRB):  DEBRIDEMENT RIGHT HEEL WITH BONE (Right) 3 Days Post-Op   Precautions: toe weight bearing only R foot      ASSESSMENT :  Based on the objective data described below, the patient presents with impaired functional mobility, balance and endurance following admission for R foot osteomyelitis with subsequent debridement of heel. Patient is toe weight bearing only on R and demonstrates good adherence to weight bearing status during ambulation. Patient is able to complete bed mobility with supervision and requires CGA for transfers and ambulation. Gait is slow and antalgic demonstrating non-functional kellen but steady overall with no LOB noted. Patient reports independence at baseline and has been using a cane for ambulation over the last two months secondary to foot wound. Patient lives alone with supportive friends but no one who is able to stay with him if needed. Patient is scheduled for further debridement on 6/17/19.  Depending on further medical course and progress with therapy recommend HHPT vs rehab following discharge. Will likely need RW at discharge. Patient will benefit from skilled intervention to address the above impairments. Patients rehabilitation potential is considered to be Good  Factors which may influence rehabilitation potential include:   ? None noted  ? Mental ability/status  ? Medical condition  ? Home/family situation and support systems  ? Safety awareness  ? Pain tolerance/management  ? Other:      PLAN :  Recommendations and Planned Interventions:  ?           Bed Mobility Training             ? Neuromuscular Re-Education  ? Transfer Training                   ? Orthotic/Prosthetic Training  ? Gait Training                         ? Modalities  ? Therapeutic Exercises           ? Edema Management/Control  ? Therapeutic Activities            ? Patient and Family Training/Education  ? Other (comment):    Frequency/Duration: Patient will be followed by physical therapy  5 times a week to address goals. Discharge Recommendations: Home Health vs rehab depending on further progress with therapy  Further Equipment Recommendations for Discharge: rolling walker     SUBJECTIVE:   Patient stated I'm doing pretty good.     OBJECTIVE DATA SUMMARY:   HISTORY:    Past Medical History:   Diagnosis Date    Diabetes ([facility]     DVT (deep venous thrombosis) ([facility] 05/2019    Right leg stent placed    Thromboembolus [facility])    History reviewed. No pertinent surgical history. Prior Level of Function/Home Situation: patient reports independence with all mobility and ADLs; has been using cane for ambulation; still driving      Home Situation  Home Environment: Private residence  # Steps to Enter:  1(threshold step)  One/Two Story Residence: Two story  # of Interior Steps: 12  Interior Rails: None  Living Alone: Yes  Support Systems: Friends \ neighbors  Patient Expects to be Discharged to[de-identified] Private residence  Current DME Used/Available at Home: Genoveva Bacca, straight  Tub or Shower Type: Tub/Shower combination    EXAMINATION/PRESENTATION/DECISION MAKING:   Critical Behavior:  Neurologic State: Alert  Orientation Level: Oriented X4  Cognition: Follows commands     Hearing: Auditory  Auditory Impairment: None  Hearing Aids/Status: Does not own    Range Of Motion:  AROM: Within functional limits                       Strength:    Strength: Within functional limits                    Tone & Sensation:   Tone: Normal                              Coordination:  Coordination: Within functional limits  Functional Mobility:  Bed Mobility:     Supine to Sit: Stand-by assistance  Sit to Supine: Stand-by assistance  Scooting: Stand-by assistance  Transfers:  Sit to Stand: Contact guard assistance;Assist x2  Stand to Sit: Contact guard assistance;Assist x2                       Balance:   Sitting: Intact  Standing: Impaired  Standing - Static: Good;Constant support  Standing - Dynamic : Fair;Constant support(using RW for support)  Ambulation/Gait Training:  Distance (ft): 100 Feet (ft)  Assistive Device: Walker, rolling;Gait belt  Ambulation - Level of Assistance: Contact guard assistance;Assist x2        Gait Abnormalities: Antalgic;Decreased step clearance  Right Side Weight Bearing: (toe)     Base of Support: Shift to left     Speed/Ana: Pace decreased (<100 feet/min); Slow  Step Length: Left shortened;Right shortened(L>R)      Gait is slow and antalgic but steady overall with no LOB noted; non-functional ana            Functional Measure:    Elder Mobility Scale    8/20         Scores under 10  generally these patients are dependent in mobility maneuvers; require help with  basic ADL, such as transfers, toileting and dressing.     Scores between 10  13  generally these patients are borderline in terms of safe mobility and  independence in ADL i.e. they require some help with some mobility maneuvers. Scores over 14  Generally these patients are able to perform mobility maneuvers alone and safely  and are independent in basic ADL. Pain:  Pain Scale 1: Numeric (0 - 10)  Pain Intensity 1: 6  Pain Location 1: Leg  Pain Orientation 1: Lateral  Pain Description 1: Aching  Pain Intervention(s) 1: Medication (see MAR)  Activity Tolerance:   VSS  Please refer to the flowsheet for vital signs taken during this treatment. After treatment:   ?         Patient left in no apparent distress sitting up in chair  ? Patient left in no apparent distress in bed  ? Call bell left within reach  ? Nursing notified  ? Caregiver present  ? Bed alarm activated    COMMUNICATION/EDUCATION:   The patients plan of care was discussed with: Registered Nurse and Rehabilitation Attendant. ?         Fall prevention education was provided and the patient/caregiver indicated understanding. ? Patient/family have participated as able in goal setting and plan of care. ?         Patient/family agree to work toward stated goals and plan of care. ?         Patient understands intent and goals of therapy, but is neutral about his/her participation. ? Patient is unable to participate in goal setting and plan of care.     Thank you for this referral.  Wisam Lombardo, PT   Time Calculation: 30 mins

## 2019-06-15 NOTE — PROGRESS NOTES
Problem: Falls - Risk of  Goal: *Absence of Falls  Description  Document Bishop Sands Fall Risk and appropriate interventions in the flowsheet.   Outcome: Progressing Towards Goal

## 2019-06-15 NOTE — PROGRESS NOTES
Podiatry    Subjective: Denies fever, chills. No new complaints regarding right foot. Patient is a 68 y.o.  male who is being seen for osteomyelitis right calcaneus. Patient Active Problem List    Diagnosis Date Noted    Diabetic foot ulcer ([facility] 2019    [facility]    Controlled type 2 diabetes mellitus with skin complication, without long-term current use of insulin ([facility] 2019     Past Medical History:   Diagnosis Date    Diabetes ([facility]     DVT (deep venous thrombosis) ([facility] 2019    Right leg stent placed    Thromboembolus [facility])      History reviewed. No pertinent surgical history. History reviewed. No pertinent family history. Social History     Tobacco Use    Smoking status: Never Smoker    Smokeless tobacco: Never Used   Substance Use Topics    Alcohol use: Yes     Alcohol/week: 3.6 oz     Types: 6 Cans of beer per week     Comment: occ     No Known Allergies  Prior to Admission medications    Medication Sig Start Date End Date Taking? Authorizing Provider   metFORMIN (GLUCOPHAGE) 1,000 mg tablet Take 1,000 mg by mouth daily. Other, MD Simón       Review of Systems AO x3. NAD. No fever, chills, nausea or vomiting. Objective:     Patient Vitals for the past 8 hrs:   BP Temp Pulse Resp SpO2   06/15/19 0741 147/79 99.7 °F (37.6 °C) 74 16 99 %     Temp (24hrs), Av.6 °F (37.6 °C), Min:99.3 °F (37.4 °C), Max:99.7 °F (37.6 °C)      Physical Exam lower Extremity  Dressings removed. Still has edema right heel and leg. Wound interior clean and granular, no foul odor. Skin margins mix of granular and necrotic. DP and PT palpable right. Pathology: Bone margin pending.     Data Review:   Recent Results (from the past 24 hour(s))   GLUCOSE, POC    Collection Time: 19 11:54 AM   Result Value Ref Range    Glucose (POC) 166 (H) 65 - 100 mg/dL    Performed by Carolina Lazar, POC    Collection Time: 06/14/19  6:04 PM   Result Value Ref Range    Glucose (POC) 132 (H) 65 - 100 mg/dL    Performed by Orpha [place], POC    Collection Time: 06/14/19 11:17 PM   Result Value Ref Range    Glucose (POC) 142 (H) 65 - 100 mg/dL    Performed by Edgar Hollow    GLUCOSE, POC    Collection Time: 06/15/19  5:25 AM   Result Value Ref Range    Glucose (POC) 100 65 - 100 mg/dL    Performed by Edgar Hollow          Impression:   Osteomyelitis right calcaneus, PO debridement      Recommendation:   Dressings changed. Still plan on additional debridement Monday, will likely use antibiotic beads and Wound VAC.  It would be ideal to have the bone margins available, but I would not delay Sx.

## 2019-06-15 NOTE — PROGRESS NOTES
Hospitalist Progress Note    NAME: [facility]   :  1950   MRN:  [id number]       Assessment / Plan:    Acute Osteomyelitis of R Foot POA  R foot Cellulitis POA  R Diabetic Foot Ulcer- non healing POA  Newly diagnosed DM type 2 POA- in 2019  PAD s/p R leg stent at [address] per pt in 2019   Recent Wound Cx- growing Pseudomonas & enterococcal sp  MRI R foot noted- Acute OM of the calcaneum     Cont on medical/surgical bed  IVF, stop good pos  IV abx- vanc + zosyn  Follow Blood Cx and wound cx/OR specimens- growing beta strep-p  IP Podiatry consulted-s/p I&D   IP ID consulted by podiatry- appreciate eval  FS Q AC & HS when eating, hold metformin, follow with ssi, a1c 7.9  Cont home ASA daily  will need picc/IV abx ? [place] at [place], lives alone    CKD 2-3  Stable, follow    Mild chronic anemia  -follow           Code Status: Full  Surrogate Decision Maker: friend Natalie Leyva Hait # [phone]     DVT Prophylaxis: Sq heparin  GI Prophylaxis: not indicated  Baseline: Pt lives with a friend, ambulatory    18.5 - 24.9 Normal weight / Body mass index is 24.01 kg/m². Recommended Disposition: TBD     Subjective:     Chief Complaint / Reason for Physician Visit  Foul smelling foot ulcer     Patient reports some tingling in his right foot but no pain and otherwise feels well. Normal appetite, no shortness of breath no chest pain. Plans are to go to the OR later today like to try to save his foot rather than amputation. He lives alone at home we discussed he may need IV antibiotics and rehab at discharge.  pt w/o c/os. No pain in foot. No sob/cp. On  since returning from OR     pt w/o c/os. Off o2 no sob. 6/15 no new c/os. Patient was evaluated at 8:30 AM        Discussed with RN events overnight.      Review of Systems:  Symptom Y/N Comments  Symptom Y/N Comments   Fever/Chills    Chest Pain n    Poor Appetite n   Edema     Cough n   Abdominal Pain n    Sputum    Joint Pain SOB/BLACK n   Pruritis/Rash     Nausea/vomit n   Tolerating PT/OT     Diarrhea    Tolerating Diet y    Constipation    Other       Could NOT obtain due to:      Objective:     VITALS:   Last 24hrs VS reviewed since prior progress note. Most recent are:  Patient Vitals for the past 24 hrs:   Temp Pulse Resp BP SpO2   06/15/19 0741 99.7 °F (37.6 °C) 74 16 147/79 99 %   06/14/19 2325 99.3 °F (37.4 °C) 70 16 137/74 98 %   06/14/19 2018 99.7 °F (37.6 °C) 75 17 156/82 98 %   06/14/19 1523 99.6 °F (37.6 °C) 74 16 154/65 100 %       Intake/Output Summary (Last 24 hours) at 6/15/2019 0859  Last data filed at 6/15/2019 0830  Gross per 24 hour   Intake 100 ml   Output 2200 ml   Net -2100 ml        PHYSICAL EXAM:  Patient is awake and alert oriented x3 on ra no distress nontoxic-appearing. Conjunctiva pink mucous membranes moist.  Cardiovascular regular rate slight systolic murmur no rubs or gallops. Lungs are clear no wheezes rhonchi or crackles. Abdomen bowel sounds present soft nontender extremities no clubbing cyanosis or edema his right foot is dressed       Reviewed most current lab test results and cultures  YES  Reviewed most current radiology test results   YES  Review and summation of old records today    NO  Reviewed patient's current orders and MAR    YES  PMH/ reviewed - no change compared to H&P  ________________________________________________________________________  Care Plan discussed with:    Comments   Patient y    Family      RN y    Care Manager     Consultant                        Multidiciplinary team rounds were held today with , nursing, pharmacist and clinical coordinator. Patient's plan of care was discussed; medications were reviewed and discharge planning was addressed.      ________________________________________________________________________  Total NON critical care TIME:   Minutes    Total CRITICAL CARE TIME Spent:   Minutes non procedure based      Comments   >50% of visit spent in counseling and coordination of care     ________________________________________________________________________  Billy Mcdonald MD     Procedures: see electronic medical records for all procedures/Xrays and details which were not copied into this note but were reviewed prior to creation of Plan. LABS:  I reviewed today's most current labs and imaging studies.   Pertinent labs include:  Recent Labs     06/14/19  0452 06/13/19  0501   WBC 7.5 9.5   HGB 9.9* 10.4*   HCT 31.3* 32.7*    302     Recent Labs     06/14/19  0452 06/13/19  0501    138   K 3.7 4.1    108   CO2 26 25   * 138*   BUN 11 13   CREA 1.03 0.97   CA 8.4* 8.4*   MG 2.0 1.9   PHOS  --  3.6       Signed: Billy Mcdonald MD

## 2019-06-16 ENCOUNTER — ANESTHESIA EVENT (OUTPATIENT)
Dept: SURGERY | Age: 69
DRG: 629 | End: 2019-06-16
Payer: MEDICARE

## 2019-06-16 LAB
BACTERIA SPEC CULT: ABNORMAL
BACTERIA SPEC CULT: ABNORMAL
BACTERIA SPEC CULT: NORMAL
GLUCOSE BLD STRIP.AUTO-MCNC: 118 MG/DL (ref 65–100)
GLUCOSE BLD STRIP.AUTO-MCNC: 132 MG/DL (ref 65–100)
GLUCOSE BLD STRIP.AUTO-MCNC: 134 MG/DL (ref 65–100)
GLUCOSE BLD STRIP.AUTO-MCNC: 158 MG/DL (ref 65–100)
SERVICE CMNT-IMP: ABNORMAL
SERVICE CMNT-IMP: NORMAL

## 2019-06-16 PROCEDURE — 65270000015 HC RM PRIVATE ONCOLOGY

## 2019-06-16 PROCEDURE — 82962 GLUCOSE BLOOD TEST: CPT

## 2019-06-16 PROCEDURE — 74011000258 HC RX REV CODE- 258: Performed by: EMERGENCY MEDICINE

## 2019-06-16 PROCEDURE — 94760 N-INVAS EAR/PLS OXIMETRY 1: CPT

## 2019-06-16 PROCEDURE — 74011250636 HC RX REV CODE- 250/636: Performed by: PODIATRIST

## 2019-06-16 PROCEDURE — 74011250637 HC RX REV CODE- 250/637: Performed by: PODIATRIST

## 2019-06-16 PROCEDURE — 74011250637 HC RX REV CODE- 250/637: Performed by: EMERGENCY MEDICINE

## 2019-06-16 PROCEDURE — 74011000258 HC RX REV CODE- 258: Performed by: PODIATRIST

## 2019-06-16 PROCEDURE — 74011250636 HC RX REV CODE- 250/636: Performed by: EMERGENCY MEDICINE

## 2019-06-16 RX ADMIN — HEPARIN SODIUM 5000 UNITS: 5000 INJECTION INTRAVENOUS; SUBCUTANEOUS at 16:31

## 2019-06-16 RX ADMIN — Medication 10 ML: at 13:22

## 2019-06-16 RX ADMIN — OXYCODONE HYDROCHLORIDE AND ACETAMINOPHEN 1 TABLET: 7.5; 325 TABLET ORAL at 19:38

## 2019-06-16 RX ADMIN — VANCOMYCIN HYDROCHLORIDE 1250 MG: 10 INJECTION, POWDER, LYOPHILIZED, FOR SOLUTION INTRAVENOUS at 09:00

## 2019-06-16 RX ADMIN — PIPERACILLIN SODIUM,TAZOBACTAM SODIUM 3.38 G: 3; .375 INJECTION, POWDER, FOR SOLUTION INTRAVENOUS at 06:15

## 2019-06-16 RX ADMIN — OXYCODONE HYDROCHLORIDE AND ACETAMINOPHEN 1 TABLET: 7.5; 325 TABLET ORAL at 06:15

## 2019-06-16 RX ADMIN — PIPERACILLIN SODIUM,TAZOBACTAM SODIUM 3.38 G: 3; .375 INJECTION, POWDER, FOR SOLUTION INTRAVENOUS at 21:20

## 2019-06-16 RX ADMIN — ASPIRIN 81 MG: 81 TABLET, COATED ORAL at 09:28

## 2019-06-16 RX ADMIN — Medication 1 CAPSULE: at 09:28

## 2019-06-16 RX ADMIN — HEPARIN SODIUM 5000 UNITS: 5000 INJECTION INTRAVENOUS; SUBCUTANEOUS at 09:28

## 2019-06-16 RX ADMIN — Medication 10 ML: at 21:20

## 2019-06-16 RX ADMIN — PIPERACILLIN SODIUM,TAZOBACTAM SODIUM 3.38 G: 3; .375 INJECTION, POWDER, FOR SOLUTION INTRAVENOUS at 13:22

## 2019-06-16 RX ADMIN — OXYCODONE HYDROCHLORIDE AND ACETAMINOPHEN 1 TABLET: 7.5; 325 TABLET ORAL at 00:18

## 2019-06-16 RX ADMIN — Medication 10 ML: at 06:17

## 2019-06-16 NOTE — PROGRESS NOTES
Oncology End of Shift Note      Bedside shift change report given to Karis Garcia RN (incoming nurse) by Ana Briones (outgoing nurse) on [address]. Report included the following information SBAR, Kardex, MAR and Recent Results. Shift Summary: No acute changes; c/o moderate pain in heel, PRN medication administered x2      Issues for Physician to Address:       Patient on Cardiac Monitoring?     [] Yes  [x] No    Rhythm:          Shift Events        Ana Briones

## 2019-06-16 NOTE — PROGRESS NOTES
ID on call  Chart checked remotely for bone culture results. No MRSA in culture ( eventhough he was on antibiotics for 24 hrs vanco + zosyn before OR culture was sent,  the culture from 6/8 done before antibiotics in ED did not have MRSA either. ( has GBS, scant e.coli and anerobes,in the surgical culture)  Because of his age, diabetes risk for renal injury high with vanco+ zosyn combo.   DC vanco  Bone pathology pending   will follow him tomorrow

## 2019-06-16 NOTE — PROGRESS NOTES
Podiatry    Subjective: No new complaints. Patient is a 68 y.o.  male who is being seen for osteomyelitis right calcaneus. Patient Active Problem List    Diagnosis Date Noted    Diabetic foot ulcer ([facility] 2019    Acute osteomyel[facility] of right foot ([facility] 2019    Controlled type 2 diabetes mellitus with skin complication, without long-term current use of insulin ([facility] 2019     Past Medical History:   Diagnosis Date    Diabetes ([facility]     DVT (deep venous thrombosis) ([facility] 2019    Right leg stent placed    Thromboembolus [facility])      History reviewed. No pertinent surgical history. History reviewed. No pertinent family history. Social History     Tobacco Use    Smoking status: Never Smoker    Smokeless tobacco: Never Used   Substance Use Topics    Alcohol use: Yes     Alcohol/week: 3.6 oz     Types: 6 Cans of beer per week     Comment: occ     No Known Allergies  Prior to Admission medications    Medication Sig Start Date End Date Taking? Authorizing Provider   metFORMIN (GLUCOPHAGE) 1,000 mg tablet Take 1,000 mg by mouth daily. Other, MD Simón       Review of Systems AO x3. NAD. Objective:     Patient Vitals for the past 8 hrs:   BP Pulse Resp SpO2   19 0700 144/79 66 18 95 %     Temp (24hrs), Av.3 °F (37.4 °C), Min:98.7 °F (37.1 °C), Max:99.8 °F (37.7 °C)      Physical Exam Lower Extremity  Dressings removed. Light bleeding present right heel. Still erythema and edema surrounding wound lateral right heel. No foul odor.   DP and PT palpable    Data Review:   Recent Results (from the past 24 hour(s))   GLUCOSE, POC    Collection Time: 06/15/19 12:21 PM   Result Value Ref Range    Glucose (POC) 125 (H) 65 - 100 mg/dL    Performed by Luis A Juan    GLUCOSE, POC    Collection Time: 06/15/19  6:24 PM   Result Value Ref Range    Glucose (POC) 160 (H) 65 - 100 mg/dL    Performed by Vladislav Killian POC    Collection Time: 19 12:01 AM   Result Value Ref Range    Glucose (POC) 134 (H) 65 - 100 mg/dL    Performed by Radha Fernández    GLUCOSE, POC    Collection Time: 06/16/19  5:46 AM   Result Value Ref Range    Glucose (POC) 118 (H) 65 - 100 mg/dL    Performed by Radha Fernández          Impression:   1. Osteomyelitis right calcaneus  2. Open surgical wound (diabetic ulcer) right heel  3. Continued cellulitis right foot and ankle      Recommendation:   Pt will be taken back to OR tomorrow morning for additional wound debridement and application of Wound VAC. Stop heparin after 3pm dosage today, NPO after midnight.

## 2019-06-16 NOTE — PROGRESS NOTES
Problem: Falls - Risk of  Goal: *Absence of Falls  Description  Document Edin Colmenares Fall Risk and appropriate interventions in the flowsheet.   Outcome: Progressing Towards Goal  Note:   Fall Risk Interventions:  Mobility Interventions: Communicate number of staff needed for ambulation/transfer, Patient to call before getting OOB, PT Consult for mobility concerns, PT Consult for assist device competence         Medication Interventions: Evaluate medications/consider consulting pharmacy, Patient to call before getting OOB, Teach patient to arise slowly    Elimination Interventions: Call light in reach, Patient to call for help with toileting needs, Toileting schedule/hourly rounds, Urinal in reach    History of Falls Interventions: Door open when patient unattended

## 2019-06-16 NOTE — PROGRESS NOTES
Hospitalist Progress Note    NAME: Yvette Anand   :  1950   MRN:  [id number]       Assessment / Plan:    Acute Osteomyelitis of R Foot POA  R foot Cellulitis POA  R Diabetic Foot Ulcer- non healing POA  Newly diagnosed DM type 2 POA- in 2019  PAD s/p R leg stent at [place] per pt in 2019   Recent Wound Cx- growing Pseudomonas & enterococcal sp  MRI R foot noted- Acute OM of the calcaneum     Cont on medical/surgical bed  Off IVFs, good pos  IV abx- vanc + zosyn  Follow Blood Cx and wound cx/OR specimens- growing beta strep-p, ecoli  IP Podiatry consulted-s/p I&D   IP ID consulted by podiatry- appreciate eval  FS Q AC & HS when eating, hold metformin, follow with ssi, a1c 7.9  Cont home ASA daily  will need picc/IV abx ? Rehab at [facility], lives alone  Plans to return to the OR for further I&D on Monday  PT per podiatry  Wound care per pod, may need wound vac at [place]    CKD 2-3  Stable, follow    Mild chronic anemia  -follow           Code Status: Full  Surrogate Decision Maker: friend Natalie Saucedo # [phone]     DVT Prophylaxis: Sq heparin  GI Prophylaxis: not indicated  Baseline: Pt lives with a friend, ambulatory    18.5 - 24.9 Normal weight / Body mass index is 24.01 kg/m². Recommended Disposition: TBD     Subjective:     Chief Complaint / Reason for Physician Visit  Foul smelling foot ulcer     Patient reports some tingling in his right foot but no pain and otherwise feels well. Normal appetite, no shortness of breath no chest pain. Plans are to go to the OR later today like to try to save his foot rather than amputation. He lives alone at home we discussed he may need IV antibiotics and rehab at discharge.  pt w/o c/os. No pain in foot. No sob/cp. On  since returning from OR     pt w/o c/os. Off o2 no sob. 6/15 no new c/os.  no new c/os. Pain controlled    Patient was evaluated at 1:30pm        Discussed with RN events overnight.      Review of Systems:  Symptom Y/N Comments  Symptom Y/N Comments   Fever/Chills    Chest Pain n    Poor Appetite n   Edema     Cough n   Abdominal Pain n    Sputum    Joint Pain     SOB/BLACK n   Pruritis/Rash     Nausea/vomit n   Tolerating PT/OT     Diarrhea    Tolerating Diet y    Constipation    Other       Could NOT obtain due to:      Objective:     VITALS:   Last 24hrs VS reviewed since prior progress note. Most recent are:  Patient Vitals for the past 24 hrs:   Temp Pulse Resp BP SpO2   06/16/19 0700  66 18 144/79 95 %   06/15/19 2312 99.8 °F (37.7 °C) 68 18 148/79 96 %   06/15/19 1950 99.4 °F (37.4 °C) 67 18 126/64 100 %   06/15/19 1525 98.7 °F (37.1 °C) 73 16 147/76 98 %       Intake/Output Summary (Last 24 hours) at 6/16/2019 1350  Last data filed at 6/16/2019 0900  Gross per 24 hour   Intake 2490 ml   Output 600 ml   Net 1890 ml        PHYSICAL EXAM:  Patient is awake and alert oriented x3 on ra no distress nontoxic-appearing. Conjunctiva pink mucous membranes moist.  Cardiovascular regular rate slight systolic murmur no rubs or gallops. Lungs are clear no wheezes rhonchi or crackles. Abdomen bowel sounds present soft nontender extremities no clubbing cyanosis or edema his right foot is dressed no change in exam      Reviewed most current lab test results and cultures  YES  Reviewed most current radiology test results   YES  Review and summation of old records today    NO  Reviewed patient's current orders and MAR    YES  PMH/ reviewed - no change compared to H&P  ________________________________________________________________________  Care Plan discussed with:    Comments   Patient y    Family      RN y    Care Manager     Consultant                        Multidiciplinary team rounds were held today with , nursing, pharmacist and clinical coordinator. Patient's plan of care was discussed; medications were reviewed and discharge planning was addressed. ________________________________________________________________________  Total NON critical care TIME:   Minutes    Total CRITICAL CARE TIME Spent:   Minutes non procedure based      Comments   >50% of visit spent in counseling and coordination of care     ________________________________________________________________________  Antony Mchugh MD     Procedures: see electronic medical records for all procedures/Xrays and details which were not copied into this note but were reviewed prior to creation of Plan. LABS:  I reviewed today's most current labs and imaging studies.   Pertinent labs include:  Recent Labs     06/14/19 0452   WBC 7.5   HGB 9.9*   HCT 31.3*        Recent Labs     06/14/19 0452      K 3.7      CO2 26   *   BUN 11   CREA 1.03   CA 8.4*   MG 2.0       Signed: Antony Mchugh MD

## 2019-06-17 ENCOUNTER — ANESTHESIA (OUTPATIENT)
Dept: SURGERY | Age: 69
DRG: 629 | End: 2019-06-17
Payer: MEDICARE

## 2019-06-17 LAB
ANION GAP SERPL CALC-SCNC: 6 MMOL/L (ref 5–15)
BASOPHILS # BLD: 0 K/UL (ref 0–0.1)
BASOPHILS NFR BLD: 1 % (ref 0–1)
BUN SERPL-MCNC: 10 MG/DL (ref 6–20)
BUN/CREAT SERPL: 10 (ref 12–20)
CALCIUM SERPL-MCNC: 8.8 MG/DL (ref 8.5–10.1)
CHLORIDE SERPL-SCNC: 106 MMOL/L (ref 97–108)
CO2 SERPL-SCNC: 27 MMOL/L (ref 21–32)
CREAT SERPL-MCNC: 1.05 MG/DL (ref 0.7–1.3)
DIFFERENTIAL METHOD BLD: ABNORMAL
EOSINOPHIL # BLD: 0.4 K/UL (ref 0–0.4)
EOSINOPHIL NFR BLD: 6 % (ref 0–7)
ERYTHROCYTE [DISTWIDTH] IN BLOOD BY AUTOMATED COUNT: 12.6 % (ref 11.5–14.5)
GLUCOSE BLD STRIP.AUTO-MCNC: 126 MG/DL (ref 65–100)
GLUCOSE BLD STRIP.AUTO-MCNC: 130 MG/DL (ref 65–100)
GLUCOSE BLD STRIP.AUTO-MCNC: 144 MG/DL (ref 65–100)
GLUCOSE BLD STRIP.AUTO-MCNC: 146 MG/DL (ref 65–100)
GLUCOSE BLD STRIP.AUTO-MCNC: 155 MG/DL (ref 65–100)
GLUCOSE SERPL-MCNC: 112 MG/DL (ref 65–100)
HCT VFR BLD AUTO: 29.7 % (ref 36.6–50.3)
HCT VFR BLD AUTO: 29.9 % (ref 36.6–50.3)
HGB BLD-MCNC: 9.5 G/DL (ref 12.1–17)
HGB BLD-MCNC: 9.7 G/DL (ref 12.1–17)
IMM GRANULOCYTES # BLD AUTO: 0 K/UL (ref 0–0.04)
IMM GRANULOCYTES NFR BLD AUTO: 0 % (ref 0–0.5)
LYMPHOCYTES # BLD: 1.5 K/UL (ref 0.8–3.5)
LYMPHOCYTES NFR BLD: 23 % (ref 12–49)
MAGNESIUM SERPL-MCNC: 2 MG/DL (ref 1.6–2.4)
MCH RBC QN AUTO: 28.2 PG (ref 26–34)
MCHC RBC AUTO-ENTMCNC: 32 G/DL (ref 30–36.5)
MCV RBC AUTO: 88.1 FL (ref 80–99)
MONOCYTES # BLD: 1 K/UL (ref 0–1)
MONOCYTES NFR BLD: 15 % (ref 5–13)
NEUTS SEG # BLD: 3.6 K/UL (ref 1.8–8)
NEUTS SEG NFR BLD: 55 % (ref 32–75)
NRBC # BLD: 0 K/UL (ref 0–0.01)
NRBC BLD-RTO: 0 PER 100 WBC
PLATELET # BLD AUTO: 303 K/UL (ref 150–400)
PMV BLD AUTO: 9.9 FL (ref 8.9–12.9)
POTASSIUM SERPL-SCNC: 3.6 MMOL/L (ref 3.5–5.1)
RBC # BLD AUTO: 3.37 M/UL (ref 4.1–5.7)
SERVICE CMNT-IMP: ABNORMAL
SODIUM SERPL-SCNC: 139 MMOL/L (ref 136–145)
WBC # BLD AUTO: 6.5 K/UL (ref 4.1–11.1)

## 2019-06-17 PROCEDURE — 85025 COMPLETE CBC W/AUTO DIFF WBC: CPT

## 2019-06-17 PROCEDURE — 77030020743 HC CAP PROTCT PIN BATR -A: Performed by: PODIATRIST

## 2019-06-17 PROCEDURE — 74011250636 HC RX REV CODE- 250/636: Performed by: ANESTHESIOLOGY

## 2019-06-17 PROCEDURE — 77030032490 HC SLV COMPR SCD KNE COVD -B: Performed by: PODIATRIST

## 2019-06-17 PROCEDURE — 74011250636 HC RX REV CODE- 250/636

## 2019-06-17 PROCEDURE — 77030018836 HC SOL IRR NACL ICUM -A: Performed by: PODIATRIST

## 2019-06-17 PROCEDURE — 77030000032 HC CUF TRNQT ZIMM -B: Performed by: PODIATRIST

## 2019-06-17 PROCEDURE — 74011000250 HC RX REV CODE- 250: Performed by: PODIATRIST

## 2019-06-17 PROCEDURE — 83735 ASSAY OF MAGNESIUM: CPT

## 2019-06-17 PROCEDURE — 94760 N-INVAS EAR/PLS OXIMETRY 1: CPT

## 2019-06-17 PROCEDURE — 86900 BLOOD TYPING SEROLOGIC ABO: CPT

## 2019-06-17 PROCEDURE — 76210000016 HC OR PH I REC 1 TO 1.5 HR: Performed by: PODIATRIST

## 2019-06-17 PROCEDURE — 74011636637 HC RX REV CODE- 636/637: Performed by: PODIATRIST

## 2019-06-17 PROCEDURE — 74011250637 HC RX REV CODE- 250/637: Performed by: PODIATRIST

## 2019-06-17 PROCEDURE — 77030011640 HC PAD GRND REM COVD -A: Performed by: PODIATRIST

## 2019-06-17 PROCEDURE — 76060000034 HC ANESTHESIA 1.5 TO 2 HR: Performed by: PODIATRIST

## 2019-06-17 PROCEDURE — 85018 HEMOGLOBIN: CPT

## 2019-06-17 PROCEDURE — 3E0V329 INTRODUCTION OF OTHER ANTI-INFECTIVE INTO BONES, PERCUTANEOUS APPROACH: ICD-10-PCS | Performed by: PODIATRIST

## 2019-06-17 PROCEDURE — 80048 BASIC METABOLIC PNL TOTAL CA: CPT

## 2019-06-17 PROCEDURE — 74011000250 HC RX REV CODE- 250

## 2019-06-17 PROCEDURE — 77030002916 HC SUT ETHLN J&J -A: Performed by: PODIATRIST

## 2019-06-17 PROCEDURE — 77030019908 HC STETH ESOPH SIMS -A: Performed by: NURSE ANESTHETIST, CERTIFIED REGISTERED

## 2019-06-17 PROCEDURE — 74011250636 HC RX REV CODE- 250/636: Performed by: PODIATRIST

## 2019-06-17 PROCEDURE — 88304 TISSUE EXAM BY PATHOLOGIST: CPT

## 2019-06-17 PROCEDURE — 77030031139 HC SUT VCRL2 J&J -A: Performed by: PODIATRIST

## 2019-06-17 PROCEDURE — 74011000258 HC RX REV CODE- 258: Performed by: EMERGENCY MEDICINE

## 2019-06-17 PROCEDURE — 88311 DECALCIFY TISSUE: CPT

## 2019-06-17 PROCEDURE — 36415 COLL VENOUS BLD VENIPUNCTURE: CPT

## 2019-06-17 PROCEDURE — 82962 GLUCOSE BLOOD TEST: CPT

## 2019-06-17 PROCEDURE — 77030018717 HC DRSG GRNUFM KCON -B: Performed by: PODIATRIST

## 2019-06-17 PROCEDURE — 77030019952 HC CANSTR VAC ASST KCON -B: Performed by: PODIATRIST

## 2019-06-17 PROCEDURE — 77030020143 HC AIRWY LARYN INTUB CGAS -A: Performed by: NURSE ANESTHETIST, CERTIFIED REGISTERED

## 2019-06-17 PROCEDURE — 77030020782 HC GWN BAIR PAWS FLX 3M -B

## 2019-06-17 PROCEDURE — C1713 ANCHOR/SCREW BN/BN,TIS/BN: HCPCS | Performed by: PODIATRIST

## 2019-06-17 PROCEDURE — 0QBL0ZX EXCISION OF RIGHT TARSAL, OPEN APPROACH, DIAGNOSTIC: ICD-10-PCS | Performed by: PODIATRIST

## 2019-06-17 PROCEDURE — 76010000153 HC OR TIME 1.5 TO 2 HR: Performed by: PODIATRIST

## 2019-06-17 PROCEDURE — 65270000015 HC RM PRIVATE ONCOLOGY

## 2019-06-17 PROCEDURE — 74011250637 HC RX REV CODE- 250/637: Performed by: EMERGENCY MEDICINE

## 2019-06-17 PROCEDURE — 74011250636 HC RX REV CODE- 250/636: Performed by: EMERGENCY MEDICINE

## 2019-06-17 DEVICE — STIMULAN® RAPID CURE PROVIDED STERILE FOR SINGLE PATIENT USE. STIMULAN® RAPID CURE CONTAINS CALCIUM SULFATE POWDER AND MIXING SOLUTION IN PRE-MEASURED QUANTITIES SO THAT WHEN MIXED TOGETHER IN A STERILE MIXING BOWL, THE RESULTANT PASTE IS TO BE DIGITALLY PACKED INTO OPEN BONE VOID/GAP TO SET INSITU OR PLACED INTO THE MOULD PROVIDED, THE MIXTURE SETS TO FORM BEADS. THE BIODEGRADABLE, RADIOPAQUE BEADS ARE RESORBED IN APPROXIMATELY 30 – 60 DAYS WHEN USED IN ACCORDANCE WITH THE DEVICE LABELLING. STIMULAN® RAPID CURE IS MANUFACTURED FROM SYNTHETIC IMPLANT GRADE CALCIUM SULFATE DIHYDRATE(CASO4.2H2O) THAT RESORBS AND IS REPLACED WITH BONE DURING THE HEALING PROCESS. ALSO, AS THE BONE VOID FILLER BEADS ARE BIODEGRADABLE AND BIOCOMPATIBLE, THEY MAY BE USED AT AN INFECTED SITE.
Type: IMPLANTABLE DEVICE | Site: HEEL | Status: FUNCTIONAL
Brand: STIMULAN® RAPID CURE

## 2019-06-17 RX ORDER — SODIUM CHLORIDE 0.9 % (FLUSH) 0.9 %
5-40 SYRINGE (ML) INJECTION AS NEEDED
Status: DISCONTINUED | OUTPATIENT
Start: 2019-06-17 | End: 2019-06-17 | Stop reason: HOSPADM

## 2019-06-17 RX ORDER — SODIUM CHLORIDE 9 MG/ML
25 INJECTION, SOLUTION INTRAVENOUS CONTINUOUS
Status: DISCONTINUED | OUTPATIENT
Start: 2019-06-17 | End: 2019-06-17 | Stop reason: HOSPADM

## 2019-06-17 RX ORDER — ONDANSETRON 2 MG/ML
4 INJECTION INTRAMUSCULAR; INTRAVENOUS AS NEEDED
Status: DISCONTINUED | OUTPATIENT
Start: 2019-06-17 | End: 2019-06-17 | Stop reason: HOSPADM

## 2019-06-17 RX ORDER — MORPHINE SULFATE 10 MG/ML
2 INJECTION, SOLUTION INTRAMUSCULAR; INTRAVENOUS
Status: DISCONTINUED | OUTPATIENT
Start: 2019-06-17 | End: 2019-06-17 | Stop reason: HOSPADM

## 2019-06-17 RX ORDER — LIDOCAINE HYDROCHLORIDE 20 MG/ML
INJECTION, SOLUTION EPIDURAL; INFILTRATION; INTRACAUDAL; PERINEURAL AS NEEDED
Status: DISCONTINUED | OUTPATIENT
Start: 2019-06-17 | End: 2019-06-17 | Stop reason: HOSPADM

## 2019-06-17 RX ORDER — HYDROMORPHONE HYDROCHLORIDE 1 MG/ML
0.5 INJECTION, SOLUTION INTRAMUSCULAR; INTRAVENOUS; SUBCUTANEOUS
Status: DISCONTINUED | OUTPATIENT
Start: 2019-06-17 | End: 2019-06-17 | Stop reason: HOSPADM

## 2019-06-17 RX ORDER — ROPIVACAINE HYDROCHLORIDE 5 MG/ML
30 INJECTION, SOLUTION EPIDURAL; INFILTRATION; PERINEURAL AS NEEDED
Status: DISCONTINUED | OUTPATIENT
Start: 2019-06-17 | End: 2019-06-17 | Stop reason: HOSPADM

## 2019-06-17 RX ORDER — MIDAZOLAM HYDROCHLORIDE 1 MG/ML
1 INJECTION, SOLUTION INTRAMUSCULAR; INTRAVENOUS AS NEEDED
Status: DISCONTINUED | OUTPATIENT
Start: 2019-06-17 | End: 2019-06-17 | Stop reason: HOSPADM

## 2019-06-17 RX ORDER — FENTANYL CITRATE 50 UG/ML
INJECTION, SOLUTION INTRAMUSCULAR; INTRAVENOUS AS NEEDED
Status: DISCONTINUED | OUTPATIENT
Start: 2019-06-17 | End: 2019-06-17 | Stop reason: HOSPADM

## 2019-06-17 RX ORDER — MIDAZOLAM HYDROCHLORIDE 1 MG/ML
0.5 INJECTION, SOLUTION INTRAMUSCULAR; INTRAVENOUS
Status: DISCONTINUED | OUTPATIENT
Start: 2019-06-17 | End: 2019-06-17 | Stop reason: HOSPADM

## 2019-06-17 RX ORDER — SODIUM CHLORIDE, SODIUM LACTATE, POTASSIUM CHLORIDE, CALCIUM CHLORIDE 600; 310; 30; 20 MG/100ML; MG/100ML; MG/100ML; MG/100ML
100 INJECTION, SOLUTION INTRAVENOUS CONTINUOUS
Status: DISCONTINUED | OUTPATIENT
Start: 2019-06-17 | End: 2019-06-17 | Stop reason: HOSPADM

## 2019-06-17 RX ORDER — LIDOCAINE HYDROCHLORIDE 10 MG/ML
0.1 INJECTION, SOLUTION EPIDURAL; INFILTRATION; INTRACAUDAL; PERINEURAL AS NEEDED
Status: DISCONTINUED | OUTPATIENT
Start: 2019-06-17 | End: 2019-06-17 | Stop reason: HOSPADM

## 2019-06-17 RX ORDER — MIDAZOLAM HYDROCHLORIDE 1 MG/ML
INJECTION, SOLUTION INTRAMUSCULAR; INTRAVENOUS AS NEEDED
Status: DISCONTINUED | OUTPATIENT
Start: 2019-06-17 | End: 2019-06-17 | Stop reason: HOSPADM

## 2019-06-17 RX ORDER — GLYCOPYRROLATE 0.2 MG/ML
INJECTION INTRAMUSCULAR; INTRAVENOUS AS NEEDED
Status: DISCONTINUED | OUTPATIENT
Start: 2019-06-17 | End: 2019-06-17 | Stop reason: HOSPADM

## 2019-06-17 RX ORDER — ACETAMINOPHEN 325 MG/1
650 TABLET ORAL ONCE
Status: DISCONTINUED | OUTPATIENT
Start: 2019-06-17 | End: 2019-06-17 | Stop reason: HOSPADM

## 2019-06-17 RX ORDER — DEXAMETHASONE SODIUM PHOSPHATE 4 MG/ML
INJECTION, SOLUTION INTRA-ARTICULAR; INTRALESIONAL; INTRAMUSCULAR; INTRAVENOUS; SOFT TISSUE AS NEEDED
Status: DISCONTINUED | OUTPATIENT
Start: 2019-06-17 | End: 2019-06-17 | Stop reason: HOSPADM

## 2019-06-17 RX ORDER — SODIUM CHLORIDE 0.9 % (FLUSH) 0.9 %
5-40 SYRINGE (ML) INJECTION EVERY 8 HOURS
Status: DISCONTINUED | OUTPATIENT
Start: 2019-06-17 | End: 2019-06-17 | Stop reason: HOSPADM

## 2019-06-17 RX ORDER — FENTANYL CITRATE 50 UG/ML
25 INJECTION, SOLUTION INTRAMUSCULAR; INTRAVENOUS
Status: DISCONTINUED | OUTPATIENT
Start: 2019-06-17 | End: 2019-06-17 | Stop reason: HOSPADM

## 2019-06-17 RX ORDER — DIPHENHYDRAMINE HYDROCHLORIDE 50 MG/ML
12.5 INJECTION, SOLUTION INTRAMUSCULAR; INTRAVENOUS AS NEEDED
Status: DISCONTINUED | OUTPATIENT
Start: 2019-06-17 | End: 2019-06-17 | Stop reason: HOSPADM

## 2019-06-17 RX ORDER — PROPOFOL 10 MG/ML
INJECTION, EMULSION INTRAVENOUS AS NEEDED
Status: DISCONTINUED | OUTPATIENT
Start: 2019-06-17 | End: 2019-06-17 | Stop reason: HOSPADM

## 2019-06-17 RX ORDER — FENTANYL CITRATE 50 UG/ML
50 INJECTION, SOLUTION INTRAMUSCULAR; INTRAVENOUS AS NEEDED
Status: DISCONTINUED | OUTPATIENT
Start: 2019-06-17 | End: 2019-06-17 | Stop reason: HOSPADM

## 2019-06-17 RX ORDER — PHENYLEPHRINE HCL IN 0.9% NACL 0.4MG/10ML
SYRINGE (ML) INTRAVENOUS AS NEEDED
Status: DISCONTINUED | OUTPATIENT
Start: 2019-06-17 | End: 2019-06-17 | Stop reason: HOSPADM

## 2019-06-17 RX ORDER — ONDANSETRON 2 MG/ML
INJECTION INTRAMUSCULAR; INTRAVENOUS AS NEEDED
Status: DISCONTINUED | OUTPATIENT
Start: 2019-06-17 | End: 2019-06-17 | Stop reason: HOSPADM

## 2019-06-17 RX ADMIN — Medication 1 CAPSULE: at 13:11

## 2019-06-17 RX ADMIN — MORPHINE SULFATE 2 MG: 2 INJECTION, SOLUTION INTRAMUSCULAR; INTRAVENOUS at 13:12

## 2019-06-17 RX ADMIN — DEXAMETHASONE SODIUM PHOSPHATE 4 MG: 4 INJECTION, SOLUTION INTRA-ARTICULAR; INTRALESIONAL; INTRAMUSCULAR; INTRAVENOUS; SOFT TISSUE at 09:40

## 2019-06-17 RX ADMIN — SODIUM CHLORIDE, POTASSIUM CHLORIDE, SODIUM LACTATE AND CALCIUM CHLORIDE: 600; 310; 30; 20 INJECTION, SOLUTION INTRAVENOUS at 09:31

## 2019-06-17 RX ADMIN — PIPERACILLIN SODIUM,TAZOBACTAM SODIUM 3.38 G: 3; .375 INJECTION, POWDER, FOR SOLUTION INTRAVENOUS at 21:23

## 2019-06-17 RX ADMIN — ASPIRIN 81 MG: 81 TABLET, COATED ORAL at 13:11

## 2019-06-17 RX ADMIN — Medication 80 MCG: at 10:20

## 2019-06-17 RX ADMIN — Medication 10 ML: at 13:12

## 2019-06-17 RX ADMIN — MORPHINE SULFATE 2 MG: 2 INJECTION, SOLUTION INTRAMUSCULAR; INTRAVENOUS at 21:22

## 2019-06-17 RX ADMIN — ONDANSETRON 4 MG: 2 INJECTION INTRAMUSCULAR; INTRAVENOUS at 10:27

## 2019-06-17 RX ADMIN — PIPERACILLIN SODIUM,TAZOBACTAM SODIUM 3.38 G: 3; .375 INJECTION, POWDER, FOR SOLUTION INTRAVENOUS at 13:11

## 2019-06-17 RX ADMIN — Medication 10 ML: at 21:23

## 2019-06-17 RX ADMIN — Medication 10 ML: at 05:37

## 2019-06-17 RX ADMIN — Medication 80 MCG: at 10:31

## 2019-06-17 RX ADMIN — PROPOFOL 100 MG: 10 INJECTION, EMULSION INTRAVENOUS at 09:36

## 2019-06-17 RX ADMIN — PIPERACILLIN SODIUM,TAZOBACTAM SODIUM 3.38 G: 3; .375 INJECTION, POWDER, FOR SOLUTION INTRAVENOUS at 05:37

## 2019-06-17 RX ADMIN — INSULIN LISPRO 2 UNITS: 100 INJECTION, SOLUTION INTRAVENOUS; SUBCUTANEOUS at 00:02

## 2019-06-17 RX ADMIN — FENTANYL CITRATE 25 MCG: 50 INJECTION, SOLUTION INTRAMUSCULAR; INTRAVENOUS at 09:43

## 2019-06-17 RX ADMIN — Medication 80 MCG: at 10:09

## 2019-06-17 RX ADMIN — MIDAZOLAM HYDROCHLORIDE 2 MG: 1 INJECTION, SOLUTION INTRAMUSCULAR; INTRAVENOUS at 09:31

## 2019-06-17 RX ADMIN — Medication 80 MCG: at 09:55

## 2019-06-17 RX ADMIN — LIDOCAINE HYDROCHLORIDE 40 MG: 20 INJECTION, SOLUTION EPIDURAL; INFILTRATION; INTRACAUDAL; PERINEURAL at 09:36

## 2019-06-17 RX ADMIN — GLYCOPYRROLATE 0.2 MG: 0.2 INJECTION INTRAMUSCULAR; INTRAVENOUS at 09:55

## 2019-06-17 NOTE — ANESTHESIA PREPROCEDURE EVALUATION
Relevant Problems   No relevant active problems       Anesthetic History   No history of anesthetic complications            Review of Systems / Medical History  Patient summary reviewed, nursing notes reviewed and pertinent labs reviewed    Pulmonary  Within defined limits                 Neuro/Psych   Within defined limits           Cardiovascular  Within defined limits                Exercise tolerance: >4 METS     GI/Hepatic/Renal  Within defined limits              Endo/Other  Within defined limits  Diabetes         Other Findings   Comments: dvt           Physical Exam    Airway  Mallampati: II  TM Distance: 4 - 6 cm  Neck ROM: normal range of motion   Mouth opening: Normal     Cardiovascular  Regular rate and rhythm,  S1 and S2 normal,  no murmur, click, rub, or gallop             Dental    Dentition: Full lower dentures and Full upper dentures     Pulmonary  Breath sounds clear to auscultation               Abdominal  GI exam deferred       Other Findings            Anesthetic Plan    ASA: 2  Anesthesia type: MAC          Induction: Intravenous  Anesthetic plan and risks discussed with: Patient

## 2019-06-17 NOTE — PROGRESS NOTES
Physical Therapy    PT intervention deferred this date due to pt in OR for I+D. Will continue to follow.     Dee Marino, PT, MPT

## 2019-06-17 NOTE — OP NOTES
[place] [address]  OPERATIVE REPORT    Name:  Saloni Cheng  MR#:  [id number]  :  1950  ACCOUNT #:  [de-identified]  DATE OF SERVICE:  2019    PREOPERATIVE DIAGNOSES:  1.  Osteomyelitis, right calcaneus. 2.  Open wound, right heel. POSTOPERATIVE DIAGNOSES:  1.  Osteomyelitis, right calcaneus. 2.  Open wound, right heel. PROCEDURE PERFORMED:  1. Debridement of wound and bone, right calcaneus. 2.  Application of wound VAC, right heel. SURGEON:  Claire Cabral DPM    ASSISTANT:  n/a    ANESTHESIA:  General with right ankle block. COMPLICATIONS:  None. SPECIMENS REMOVED:  Bone margin, right calcaneus. IMPLANTS:  Stimulan absorbable beads with 1 g of vancomycin powder incorporated into the mix. ESTIMATED BLOOD LOSS:  5 mL. PROCEDURE IN DETAIL:  The patient was brought into the operating room and placed supine upon the OR table. After the administration of general anesthesia, the right lower extremity was prepped and draped in the usual sterile technique. The right lower extremity was elevated for 3 minutes before the inflation of the tourniquet around the right thigh to a pressure of 350 mmHg. An ankle block was applied to the right ankle utilizing 15 mL of 0.5% plain Marcaine. Attention was directed to the open wound, lateral right heel. Inspection of the wound revealed some additional necrotic subcutaneous tissue along the proximal superior aspect of the wound near the calcaneus and some additional dark necrotic bone visible within the wound mostly towards the posterior plantar remainder of the calcaneus. These were both debrided with the combination of a curette, a rongeur, a small curved Metzenbaum scissors and finally, an oval shaped rotary marii. Surgical site was flushed well with sterile irrigation and no additional necrotic tissue or bone was observed.   A clean rongeur was used to take a small sample of bone from the lateral calcaneus and this will be sent to Pathology marked as bone margin. The Stimulan Bead Set was reconstituted with 1 g of vancomycin powder mixed in and the large size mold for beads was utilized. The entire 10 mL set was then packed into the wound of the right heel. This was then covered with Adaptic and a black GranuFoam was cut to size and placed over the wound. The foot was then covered with the impermeable plastic drape and attached to suction. Suction was initiated at -75 mmHg. The seal was good. There was no significant air leak. The patient was transported to the recovery room with vital signs stable and vascular status intact. He will remain until stable for transfer back to his hospital bed. I will continue to follow the patient while he remains in house.       Suyapa Sheldon DPM [id number]  D:  06/17/2019 11:18  T:  06/17/2019 11:22  JOB #:  [id number]

## 2019-06-17 NOTE — PROGRESS NOTES
Problem: Falls - Risk of  Goal: *Absence of Falls  Description  Document Inna Canela Fall Risk and appropriate interventions in the flowsheet.   Outcome: Progressing Towards Goal  Note:   Fall Risk Interventions:  Mobility Interventions: Communicate number of staff needed for ambulation/transfer, PT Consult for mobility concerns, PT Consult for assist device competence         Medication Interventions: Evaluate medications/consider consulting pharmacy, Patient to call before getting OOB, Teach patient to arise slowly    Elimination Interventions: Call light in reach, Patient to call for help with toileting needs, Toileting schedule/hourly rounds, Urinal in reach    History of Falls Interventions: Door open when patient unattended

## 2019-06-17 NOTE — PROGRESS NOTES
Infectious Disease Progress Note        Mr Barrera Face seen  Came back from debridement   Tolerating IV antibiotics, denied GI upset, diarrhea , fever, chills, rash  Pain controlled per him post op       Current Facility-Administered Medications:     lactobac ac& pc-s.therm-b.anim (JENNIFER Q/RISAQUAD), 1 Cap, Oral, DAILY, Dania Reynolds MD, 1 Cap at 06/17/19 1311    oxyCODONE-acetaminophen (PERCOCET 7.5) 7.5-325 mg per tablet 1 Tab, 1 Tab, Oral, Q6H PRN, Valdo Pinzon DPM, 1 Tab at 06/16/19 1938    morphine injection 2 mg, 2 mg, IntraVENous, Q3H PRN, Valdo Pinzon DPM, 2 mg at 06/17/19 1312    ondansetron (ZOFRAN) injection 4 mg, 4 mg, IntraVENous, Q4H PRN, Valdo Pinzon DPM    piperacillin-tazobactam (ZOSYN) 3.375 g in 0.9% sodium chloride (MBP/ADV) 100 mL, 3.375 g, IntraVENous, Q8H, Dania Reynolds MD, Last Rate: 25 mL/hr at 06/17/19 1311, 3.375 g at 06/17/19 1311    sodium chloride (NS) flush 5-40 mL, 5-40 mL, IntraVENous, Q8H, Indra Pinzon DPSHAWN, 10 mL at 06/17/19 1312    sodium chloride (NS) flush 5-40 mL, 5-40 mL, IntraVENous, PRN, Valdo Pinzon DPM    ondansetron (ZOFRAN) injection 4 mg, 4 mg, IntraVENous, Q4H PRN, Valdo Pinzon DPM    insulin lispro (HUMALOG) injection, , SubCUTAneous, Q6H, Valdo Pinzon DPM, Stopped at 06/17/19 0600    glucose chewable tablet 16 g, 4 Tab, Oral, PRN, Valdo Pinzon DPM    glucagon (GLUCAGEN) injection 1 mg, 1 mg, IntraMUSCular, PRN, Valdo Pinzon DPM    aspirin delayed-release tablet 81 mg, 81 mg, Oral, DAILY, Valdo Pinzon, DPM, 81 mg at 06/17/19 1311        Physical Exam:    Vitals:   Patient Vitals for the past 24 hrs:   Temp Pulse Resp BP SpO2   06/17/19 1215  67 18 137/66 95 %   06/17/19 1200 98.9 °F (37.2 °C) 67 15 132/67 94 %   06/17/19 1145  67 13 138/69 97 %   06/17/19 1130  67 12 133/66 100 %   06/17/19 1120  70 14 140/84 100 %   06/17/19 1115  68 17 130/68 100 %   06/17/19 1110  68 17 127/66 100 %   06/17/19 1106 99.2 °F (37.3 °C) 67 14 123/65 99 %   06/17/19 1105  67 15 124/63 99 %   06/17/19 1103  68 14 123/65 99 %   06/17/19 0832 98.4 °F (36.9 °C) 70 10 149/80 95 %   06/17/19 0732 99.4 °F (37.4 °C) 68 18 156/75 98 %   06/16/19 2341 99.4 °F (37.4 °C) 87 18 167/74 100 %   06/16/19 2008 99.6 °F (37.6 °C) 92 18 175/82 100 %   06/16/19 1520 98.4 °F (36.9 °C) 77 18 179/86 98 %     · GEN: NAD  · HEENT:no scleral icterus,  no thrush  · CV: S1, S2 heard regularly  · Lungs: Clear to auscultation bilaterally  · Abdomen: soft, non distended, non tender,   · Extremities: no edema  · Neuro: Alert, oriented to self, place and situation, moves all extremities to commands, verbal   · Skin: no rash  MSK: + R foot post op dressing, wound vac       Labs:   Recent Results (from the past 24 hour(s))   GLUCOSE, POC    Collection Time: 06/16/19  6:15 PM   Result Value Ref Range    Glucose (POC) 132 (H) 65 - 100 mg/dL    Performed by Kota Godwin (PCT)    GLUCOSE, POC    Collection Time: 06/16/19 11:58 PM   Result Value Ref Range    Glucose (POC) 155 (H) 65 - 100 mg/dL    Performed by Bouf    GLUCOSE, POC    Collection Time: 06/17/19  5:35 AM   Result Value Ref Range    Glucose (POC) 126 (H) 65 - 100 mg/dL    Performed by Bouf    CBC WITH AUTOMATED DIFF    Collection Time: 06/17/19  5:43 AM   Result Value Ref Range    WBC 6.5 4.1 - 11.1 K/uL    RBC 3.37 (L) 4.10 - 5.70 M/uL    HGB 9.5 (L) 12.1 - 17.0 g/dL    HCT 29.7 (L) 36.6 - 50.3 %    MCV 88.1 80.0 - 99.0 FL    MCH 28.2 26.0 - 34.0 PG    MCHC 32.0 30.0 - 36.5 g/dL    RDW 12.6 11.5 - 14.5 %    PLATELET 200 125 - 684 K/uL    MPV 9.9 8.9 - 12.9 FL    NRBC 0.0 0  WBC    ABSOLUTE NRBC 0.00 0.00 - 0.01 K/uL    NEUTROPHILS 55 32 - 75 %    LYMPHOCYTES 23 12 - 49 %    MONOCYTES 15 (H) 5 - 13 %    EOSINOPHILS 6 0 - 7 %    BASOPHILS 1 0 - 1 %    IMMATURE GRANULOCYTES 0 0.0 - 0.5 %    ABS. NEUTROPHILS 3.6 1.8 - 8.0 K/UL    ABS. LYMPHOCYTES 1.5 0.8 - 3.5 K/UL    ABS.  MONOCYTES 1.0 0.0 - 1.0 K/UL    ABS. EOSINOPHILS 0.4 0.0 - 0.4 K/UL    ABS. BASOPHILS 0.0 0.0 - 0.1 K/UL    ABS. IMM. GRANS. 0.0 0.00 - 0.04 K/UL    DF AUTOMATED     METABOLIC PANEL, BASIC    Collection Time: 06/17/19  5:43 AM   Result Value Ref Range    Sodium 139 136 - 145 mmol/L    Potassium 3.6 3.5 - 5.1 mmol/L    Chloride 106 97 - 108 mmol/L    CO2 27 21 - 32 mmol/L    Anion gap 6 5 - 15 mmol/L    Glucose 112 (H) 65 - 100 mg/dL    BUN 10 6 - 20 MG/DL    Creatinine 1.05 0.70 - 1.30 MG/DL    BUN/Creatinine ratio 10 (L) 12 - 20      GFR est AA >60 >60 ml/min/1.73m2    GFR est non-AA >60 >60 ml/min/1.73m2    Calcium 8.8 8.5 - 10.1 MG/DL   MAGNESIUM    Collection Time: 06/17/19  5:43 AM   Result Value Ref Range    Magnesium 2.0 1.6 - 2.4 mg/dL   GLUCOSE, POC    Collection Time: 06/17/19 11:24 AM   Result Value Ref Range    Glucose (POC) 130 (H) 65 - 100 mg/dL    Performed by Melody Marinelli, POC    Collection Time: 06/17/19 12:39 PM   Result Value Ref Range    Glucose (POC) 146 (H) 65 - 100 mg/dL    Performed by Oskar Barrera        Microbiology Data:       Blood:6/11/19         Component Value Ref Range & Units Status   Special Requests: NO SPECIAL REQUESTS    Final   Culture result: NO GROWTH 5 DAYS    Final   Result History         Bone 6/12/19  Bone         Component Value Ref Range & Units Status   Special Requests: NO SPECIAL REQUESTS    Final   GRAM STAIN OCCASIONAL WBCS SEEN    Final   GRAM STAIN    Final   RARE APPARENT INTRACELLULAR GRAM POSITIVE COCCI IN PAIRS    Culture result: SCANT ESCHERICHIA COLIAbnormal     Final   Culture result: Abnormal     Final   SCANT STREPTOCOCCI, BETA HEMOLYTIC GROUP B Penicillin and ampicillin are drugs of choice for treatment of beta-hemolytic streptococcal infections. Susceptibility testing of penicillins and beta-lactams approved by the FDA for treatment of beta-hemolytic streptococcal infections need not be performed routinely, because nonsusceptible isolates are extremely rare. CLSI 2012   Culture result:   Final   (PLEASE REFER TO Constance Pop [id number] FOR ANAEROBIC GROWTH)    Susceptibility      Escherichia coli     YASMINE    Amikacin ($) <=16 ug/mL S    Ampicillin ($) >16 ug/mL R    Ampicillin/sulbactam ($) >16/8 ug/mL R    Aztreonam ($$$$) <=4 ug/mL S    Cefazolin ($) <=8 ug/mL S    Cefepime ($$) <=4 ug/mL S    Cefotaxime <=2 ug/mL S    Ceftazidime ($) <=1 ug/mL S    Ceftriaxone ($) <=1 ug/mL S    Cefuroxime ($) <=4 ug/mL S    Ciprofloxacin ($) <=1 ug/mL S    Gentamicin ($) >8 ug/mL R    Imipenem <=1 ug/mL S    Levofloxacin ($) <=2 ug/mL S    Meropenem ($$) <=1 ug/mL S    Piperacillin/Tazobac ($) <=16 ug/mL S    Tobramycin ($) >8 ug/mL R    Trimeth/Sulfa >2/38 ug/mL R          Result History       Component Value Ref Range & Units Status   Special Requests: HEEL   Final   Culture result: Abnormal     Final   SCANT STREPTOCOCCI, BETA HEMOLYTIC GROUP B Penicillin and ampicillin are drugs of choice for treatment of beta-hemolytic streptococcal infections. Susceptibility testing of penicillins and beta-lactams approved by the FDA for treatment of beta-hemolytic streptococcal infections need not be performed routinely, because nonsusceptible isolates are extremely rare. CLSI 2012   Culture result: Abnormal     Final   LIGHT MIXED ANAEROBIC GRAM NEGATIVE RODS BETA LACTAMASE NEGATIVE                Component Value Ref Range & Units Status   Special Requests: HEEL   Preliminary   Culture result: NO FUNGUS ISOLATED 5 DAYS    Preliminary   Result History       Wound foot  6/8/19          Component Value Ref Range & Units Status   Special Requests: NO SPECIAL REQUESTS    Final   GRAM STAIN OCCASIONAL WBCS SEEN    Final   GRAM STAIN 3+ GRAM NEGATIVE RODS    Final   Culture result: Abnormal     Final   MODERATE ENTEROCOCCUS FAECALIS GROUP D    Culture result: Abnormal     Final   LIGHT STREPTOCOCCI, BETA HEMOLYTIC GROUP B .  Penicillin and ampicillin are drugs of choice for treatment of beta-hemolytic streptococcal infections. Susceptibility testing of penicillins and beta-lactams approved by the FDA for treatment of beta-hemolytic streptococcal infections need not be performed routinely, because nonsusceptible isolates are extremely rare. CLSI 2012   Culture result: FEW PSEUDOMONAS AERUGINOSAAbnormal     Final   Susceptibility      Enterococcus  faecalis group D Pseudomonas aeruginosa     YASMINE YASMINE    Amikacin ($)   <=16 ug/mL S    Ampicillin ($) <=2 ug/mL S      Aztreonam ($$$$)   <=4 ug/mL S    Cefepime ($$)   <=4 ug/mL S    Ceftazidime ($)   <=1 ug/mL S    Ciprofloxacin ($)   <=1 ug/mL S    Daptomycin ($$$$$) 1 ug/mL S      Gentamicin ($)   <=4 ug/mL S    Imipenem   <=1 ug/mL S    Levofloxacin ($)   <=2 ug/mL S    Linezolid ($$$$$) 2 ug/mL S      Meropenem ($$)   <=1 ug/mL S    Penicillin G ($$) 2 ug/mL S      Piperacillin/Tazobac ($)   <=16 ug/mL S    Tobramycin ($)   <=4 ug/mL S    Vancomycin ($) 2 ug/mL S              Result History         Imaging:     MRI R foot   FINDINGS: Bone marrow: There is a small erosion of the posterior lateral  inferior calcaneus with underlying enhancement and edema and decreased T1 signal  consistent with acute osteomyelitis.     Joint fluid:  None.     Tendons: Intact.     Muscles: Mild edema in the musculature likely related to diabetic neuropathy.     Neurovascular bundles: Within normal limits.     Articular cartilage:Intact without focal osteochondral lesion.     Soft tissue mass: There is a soft tissue ulcer of the posterior lateral healed  extending to near to the calcaneus. There is nonspecific subcutaneous edema  surrounding the foot and ankle.     IMPRESSION  IMPRESSION:   Ulceration of the posterior lateral heel with acute osteomyelitis in the  posterior lateral inferior calcaneus.       DASIA 6/11/19  Interpretation Summary     · The right resting DASIA is normal.  · The left resting DASIA is mildly decreased.  Arterial disease noted at the level of the femoral artery, popliteal and tibial artery on the left side. · Significant disease in the left pedial arch and digits. · The left TBI is decreased to 0.22. · The right TBI is 0.52. X ray R foot 6/8/19     FINDINGS: Three views of the right foot. There is ulceration of the plantar  aspect of the heel. Moderate degenerative changes are seen in the tibiotalar  joint. There is a pes planus deformity with mild degenerative changes in the  midfoot. There is a type II accessory navicular bone. There is a hallux valgus  deformity. No acute fracture or dislocation. No evidence of osteomyelitis. There  is mild soft tissue swelling of the dorsum of the foot.     IMPRESSION  IMPRESSION:   1. Ulceration of the plantar aspect of the heel without evidence of  osteomyelitis. 2. Mild soft tissue swelling of the dorsum of the foot. 3. Degenerative changes. Assessment / Plan:       Mr Sulema Andrea is a 68year old gentleman with hx of DM, long standing wound on R heel area admitted with necrotic wound and osteomyelitis. 1) R foot osteomyelitis:  S/P debridement bone /wound R calcaneous on 6/12/19 and again 6/17/19  Await cultures and pathology   Continue Zosyn IV renally dosed   Final duration, choice and route of antibiotics to be determined   Probiotics/yogurt encouraged   Offloading pressure per Podiatry   Pain control per primary team       2) DM  A1C 7.9     3) Screening: HIV and Hep C negative     4) DVT px        Thank for the opportunity to participate in the care of this patient. Please contact with questions or concerns.        Kim Figueroa DO  1:44 PM

## 2019-06-17 NOTE — PROGRESS NOTES
Oncology End of Shift Note      Bedside shift change report given to BRENDA York (incoming nurse) by Spike Marion (outgoing nurse) on [address]. Report included the following information SBAR, Kardex, MAR and Recent Results. Shift Summary: no acute changes; lab drawn      Issues for Physician to Address:       Patient on Cardiac Monitoring?     [] Yes  [x] No    Rhythm:          Shift Events        Spike Marion

## 2019-06-17 NOTE — ROUTINE PROCESS
Patient: Lee Ann Butler MRN: [id number]  SSN: [id number] YOB: 1950  Age: 68 y.o. Sex: male Patient is status post Procedure(s): DEBRIDEMENT WOUND AND BONE RIGHT HEEL APPLICATION OF WOUND VAC INSERTION OF ABSORBABLE ANTIBIOTIC BEADS. Surgeon(s) and Role: * Savannah Pinzon DPM - Primary Local/Dose/Irrigation: 15ml of 0.5% marcaine plain Peripheral IV 06/13/19 Right Forearm (Active) Site Assessment Clean, dry, & intact 6/17/2019  8:38 AM  
Phlebitis Assessment 0 6/17/2019  8:38 AM  
Infiltration Assessment 0 6/17/2019  8:38 AM  
Dressing Status Clean, dry, & intact 6/17/2019  8:38 AM  
Dressing Type Tape;Transparent 6/17/2019  8:38 AM  
Hub Color/Line Status Blue; Infusing 6/17/2019  8:38 AM  
Action Taken Other (comment) 6/13/2019  9:41 AM  
Alcohol Cap Used Yes 6/16/2019  3:46 PM  
   
Peripheral IV 06/17/19 Left Forearm (Active) Hemovac Right Foot (Active) Site Assessment Clean;Dry 6/17/2019 10:09 AM  
Dressing Status Clean, dry, & intact 6/17/2019 10:09 AM  
Drainage Description Serosanguinous 6/17/2019 10:09 AM  
Status Patent; Charged;Draining 6/17/2019 10:09 AM  
           
 
 
 
Dressing/Packing:  Wound Heel Right One site with adaptic and wound vac -Dressing Type: Vacuum dressing; Other (Comment) (06/17/19 1039) Wound Heel Right-Dressing Type: Compression Wrap/Venous Stasis (06/16/19 1546) Splint/Cast:  ] Other:

## 2019-06-17 NOTE — PERIOP NOTES
TRANSFER - OUT REPORT:    Verbal report given to [facility], RN (name) on Leticia Wan  being transferred to [address] [place] [address] (unit) for routine post - op       Report consisted of patients Situation, Background, Assessment and   Recommendations(SBAR). Information from the following report(s) SBAR, Kardex, OR Summary, Procedure Summary, Intake/Output and MAR was reviewed with the receiving nurse. Lines:   Peripheral IV 06/13/19 Right Forearm (Active)   Site Assessment Clean, dry, & intact 6/17/2019 11:03 AM   Phlebitis Assessment 0 6/17/2019 11:03 AM   Infiltration Assessment 0 6/17/2019 11:03 AM   Dressing Status Clean, dry, & intact 6/17/2019 11:03 AM   Dressing Type Transparent;Tape 6/17/2019 11:03 AM   Hub Color/Line Status Capped 6/17/2019 11:03 AM   Action Taken Other (comment) 6/13/2019  9:41 AM   Alcohol Cap Used Yes 6/16/2019  3:46 PM       Peripheral IV 06/17/19 Left Forearm (Active)   Site Assessment Clean, dry, & intact 6/17/2019 11:03 AM   Phlebitis Assessment 0 6/17/2019 11:03 AM   Infiltration Assessment 0 6/17/2019 11:03 AM   Dressing Status Clean, dry, & intact 6/17/2019 11:03 AM   Dressing Type Transparent;Tape 6/17/2019 11:03 AM   Hub Color/Line Status Infusing;Patent 6/17/2019 11:03 AM        Opportunity for questions and clarification was provided.       Patient transported with:   [employer]

## 2019-06-17 NOTE — PERIOP NOTES
TRANSFER - IN REPORT:    Verbal report received from [facility] RN(name) on Javier Hermosilloigham  being received from Oncology Room 1136(unit) for ordered procedure      Report consisted of patients Situation, Background, Assessment and   Recommendations(SBAR). Information from the following report(s) SBAR, Kardex, Intake/Output, MAR, Recent Results and Procedure Verification was reviewed with the receiving nurse. Opportunity for questions and clarification was provided. Assessment completed upon patients arrival to unit and care assumed.

## 2019-06-17 NOTE — INTERDISCIPLINARY ROUNDS
Oncology Interdisciplinary rounds were held today to discuss patient plan of care and outcomes. The following members were present: Nursing, Physician, Case Management, Pharmacy, and PT/OT Actual Length of Stay: 6 DRG GLOS: 6 Expected Length of Stay: 6d 0h 
 
 
 
               Plan            Discharge IV ABX Wound care/Wound vac  Home?? Home health

## 2019-06-17 NOTE — PROGRESS NOTES
Received  Callback from Dr. Allie Arriola, advised of patient condition. Telephone orders received to turn wound  Vac off for 3 hours (until 8 pm), resume, and continue to monitor. Advised to call back if continues.

## 2019-06-17 NOTE — PROGRESS NOTES
Hospitalist Progress Note    NAME: Leticia Wan   :  1950   MRN:  [id number]       Assessment / Plan:    Acute Osteomyelitis of R Foot POA  R foot Cellulitis POA  R Diabetic Foot Ulcer- non healing POA  Newly diagnosed DM type 2 POA- in 2019  PAD s/p R leg stent at [place] per pt in 2019   Recent Wound Cx - growing Pseudomonas & enterococcal sp  MRI R foot noted- Acute OM of the calcaneum     Cont on medical/surgical bed  Off IVFs, good pos  IV abx- vanc + zosyn, vanc dced  per ID  Follow Blood Cx and wound cx/OR specimens- growing beta strep-p, ecoli  IP Podiatry consulted-s/p I&D   IP ID consulted by podiatry- daphne camarena, await cx for further recs  FS Q AC & HS when eating, hold metformin, follow with ssi, a1c 7.9  Cont home ASA daily  will need picc/IV abx ? Rehab at [facility], lives alone  OR today for I&D, cx-p, blood in tubing nursing making podiatry aware  PT per podiatry  Wound care per pod, will need wound vac at dc  T&S and serial h/h, seems like quite a bit of bleeding, follow closely in case needs transfsuion    CKD 2-3  Stable, follow    Elevated BP  -BP has been a bit high, not on meds at home  -since just returned from the OR, will hold on any meds, especially in the setting of possible bleeding post op    Mild chronic anemia  -follow           Code Status: Full  Surrogate Decision Maker: friend Natalielilli Bower Jonnathan # [phone]     DVT Prophylaxis: Sq heparin  GI Prophylaxis: not indicated  Baseline: Pt lives with a friend, ambulatory    18.5 - 24.9 Normal weight / Body mass index is 24.01 kg/m². Recommended Disposition: TBD ? rehab for IV abx, wound vac, PT     Subjective:     Chief Complaint / Reason for Physician Visit  Foul smelling foot ulcer     Patient reports some tingling in his right foot but no pain and otherwise feels well. Normal appetite, no shortness of breath no chest pain.   Plans are to go to the OR later today like to try to save his foot rather than amputation. He lives alone at home we discussed he may need IV antibiotics and rehab at discharge. 6/13 pt w/o c/os. No pain in foot. No sob/cp. On 02 NC since returning from OR    6/14 pt w/o c/os. Off o2 no sob. 6/15 no new c/os. 6/16 no new c/os. Pain controlled    6/17 pt just returned from the OR, now with wound vac, red blood in tubing. Pt w/o c/os. No pain, not LH or dizzy or sob. Patient was evaluated at 1:35pm        Discussed with RN events overnight. Review of Systems:  Symptom Y/N Comments  Symptom Y/N Comments   Fever/Chills    Chest Pain n    Poor Appetite n   Edema     Cough n   Abdominal Pain n    Sputum    Joint Pain     SOB/BLACK n   Pruritis/Rash     Nausea/vomit n   Tolerating PT/OT     Diarrhea    Tolerating Diet y    Constipation    Other       Could NOT obtain due to:      Objective:     VITALS:   Last 24hrs VS reviewed since prior progress note. Most recent are:  Patient Vitals for the past 24 hrs:   Temp Pulse Resp BP SpO2   06/17/19 1215  67 18 137/66 95 %   06/17/19 1200 98.9 °F (37.2 °C) 67 15 132/67 94 %   06/17/19 1145  67 13 138/69 97 %   06/17/19 1130  67 12 133/66 100 %   06/17/19 1120  70 14 140/84 100 %   06/17/19 1115  68 17 130/68 100 %   06/17/19 1110  68 17 127/66 100 %   06/17/19 1106 99.2 °F (37.3 °C) 67 14 123/65 99 %   06/17/19 1105  67 15 124/63 99 %   06/17/19 1103  68 14 123/65 99 %   06/17/19 0832 98.4 °F (36.9 °C) 70 10 149/80 95 %   06/17/19 0732 99.4 °F (37.4 °C) 68 18 156/75 98 %   06/16/19 2341 99.4 °F (37.4 °C) 87 18 167/74 100 %   06/16/19 2008 99.6 °F (37.6 °C) 92 18 175/82 100 %   06/16/19 1520 98.4 °F (36.9 °C) 77 18 179/86 98 %       Intake/Output Summary (Last 24 hours) at 6/17/2019 1429  Last data filed at 6/17/2019 1215  Gross per 24 hour   Intake 1100 ml   Output 800 ml   Net 300 ml        PHYSICAL EXAM:  Patient is awake and alert oriented x3 on ra no distress nontoxic-appearing.   Conjunctiva pink mucous membranes moist. Cardiovascular regular rate slight systolic murmur no rubs or gallops. Lungs are clear no wheezes rhonchi or crackles. Abdomen bowel sounds present soft nontender extremities no clubbing cyanosis or edema his right foot with wound vac, red blood in tubing      Reviewed most current lab test results and cultures  YES  Reviewed most current radiology test results   YES  Review and summation of old records today    NO  Reviewed patient's current orders and MAR    YES  PMH/SH reviewed - no change compared to H&P  ________________________________________________________________________  Care Plan discussed with:    Comments   Patient y    Family      RN y    Care Manager     Consultant                        Multidiciplinary team rounds were held today with , nursing, pharmacist and clinical coordinator. Patient's plan of care was discussed; medications were reviewed and discharge planning was addressed. ________________________________________________________________________  Total NON critical care TIME:   Minutes    Total CRITICAL CARE TIME Spent:   Minutes non procedure based      Comments   >50% of visit spent in counseling and coordination of care     ________________________________________________________________________  Billy Mcdonald MD     Procedures: see electronic medical records for all procedures/Xrays and details which were not copied into this note but were reviewed prior to creation of Plan. LABS:  I reviewed today's most current labs and imaging studies.   Pertinent labs include:  Recent Labs     06/17/19  0543   WBC 6.5   HGB 9.5*   HCT 29.7*        Recent Labs     06/17/19  0543      K 3.6      CO2 27   *   BUN 10   CREA 1.05   CA 8.8   MG 2.0       Signed: Billy Mcdonald MD

## 2019-06-17 NOTE — PROGRESS NOTES
Second page made to Dr. Simi Saravia office regarding volume of drainage to wound vac. Appears to have slowed down and at level of 225. Advised attending. Will follow up for call back.

## 2019-06-17 NOTE — ANESTHESIA POSTPROCEDURE EVALUATION
Procedure(s):  DEBRIDEMENT WOUND AND BONE RIGHT HEEL APPLICATION OF WOUND VAC INSERTION OF ABSORBABLE ANTIBIOTIC BEADS.    general    Anesthesia Post Evaluation        Patient location during evaluation: PACU  Note status: Adequate. Level of consciousness: responsive to verbal stimuli and sleepy but conscious  Pain management: satisfactory to patient  Airway patency: patent  Anesthetic complications: no  Cardiovascular status: acceptable  Respiratory status: acceptable  Hydration status: acceptable  Comments: +Post-Anesthesia Evaluation and Assessment    Patient: Al Cummings MRN: [id number]  SSN: [id number]   YOB: 1950  Age: 68 y.o. Sex: male          Cardiovascular Function/Vital Signs    /66   Pulse 67   Temp 37.2 °C (98.9 °F)   Resp 18   Ht 6' (1.829 m)   Wt 80.3 kg (177 lb)   SpO2 95%   BMI 24.01 kg/m²     Patient is status post Procedure(s):  DEBRIDEMENT WOUND AND BONE RIGHT HEEL APPLICATION OF WOUND VAC INSERTION OF ABSORBABLE ANTIBIOTIC BEADS. Nausea/Vomiting: Controlled. Postoperative hydration reviewed and adequate. Pain:  Pain Scale 1: Numeric (0 - 10) (06/17/19 1200)  Pain Intensity 1: 0 (06/17/19 1200)   Managed. Neurological Status:   Neuro (WDL): Exceptions to WDL (06/17/19 1103)   At baseline. Mental Status and Level of Consciousness: Arousable. Pulmonary Status:   O2 Device: Room air (06/17/19 1200)   Adequate oxygenation and airway patent. Complications related to anesthesia: None    Post-anesthesia assessment completed. No concerns. I have evaluated the patient and the patient is stable and ready to be discharged from PACU . Signed By: Natalya Chavarria MD    6/17/2019        Vitals Value Taken Time   /66 6/17/2019 12:15 PM   Temp 37.2 °C (98.9 °F) 6/17/2019 12:00 PM   Pulse 66 6/17/2019 12:19 PM   Resp 17 6/17/2019 12:19 PM   SpO2 95 % 6/17/2019 12:19 PM   Vitals shown include unvalidated device data.

## 2019-06-17 NOTE — PROGRESS NOTES
Attending discussed volume of wound vac with myself. Advised wound vac level at 150 upon return to room from PACu and currenly at 200 after 2 hours. MD requested page to surgeon. Paged Dr. Tiara De Santiago at [phone]. Awaiting call back.

## 2019-06-17 NOTE — PERIOP NOTES
Handoff Report from Operating Room to PACU    Report received from Merle Smith RN and Alba Caceres CRNA regarding Allyson William. Surgeon(s):  Raz Pinzon DPM  And Procedure(s) (LRB):  DEBRIDEMENT WOUND AND BONE RIGHT HEEL APPLICATION OF WOUND VAC INSERTION OF ABSORBABLE ANTIBIOTIC BEADS (Right)  confirmed   with allergies, drains, dressings and local anesthetic discussed. Anesthesia type, drugs, patient history, complications, estimated blood loss, vital signs, intake and output, and last pain medication, lines and temperature were reviewed.

## 2019-06-17 NOTE — BRIEF OP NOTE
BRIEF OPERATIVE NOTE    Date of Procedure: 6/17/2019   Preoperative Diagnosis: 1. OSTEOMYELITIS RIGHT CALCANEUS, 2. OPEN WOUND RIGHT FOOT  Postoperative Diagnosis: 1. OSTEOMYELITIS RIGHT CALCANEUS, 2. OPEN WOUND RIGHT FOOT    Procedure(s):  1. DEBRIDEMENT WOUND AND BONE RIGHT HEEL, 2.  APPLICATION OF WOUND VAC RIGHT FOOT  Surgeon(s) and Role:     * Missy Pinzon DPM - Primary         Surgical Assistant: N/A    Surgical Staff:  Circ-1: Hakeem Tamez RN  Scrub Tech-1: Lindsey Galaviz  Event Time In Time Out   Incision Start 2518    Incision Close 1049      Anesthesia: General   Estimated Blood Loss: 5 ML  Specimens:   ID Type Source Tests Collected by Time Destination   1 : Bone Margin; Right Calcaneous Osteomyelitis Preservative Foot, Right  Remigio Darnell DPM 6/17/2019 1032 Pathology      Findings: Additional necrotic sub-Q and bone resected    Complications: None  Implants: Stimulans absorbable beads (with 1g Vancomycin powder)

## 2019-06-17 NOTE — PROGRESS NOTES
SW met w/patient to discuss SNF options. Pt voiced \"no SNF. I want HH\". Writer acknowledged understanding. Freedom of Choice offered and signed. Patient elected [facility]. Referral to be sent.     JACQUES Guerra  [phone]

## 2019-06-18 LAB
ABO + RH BLD: NORMAL
ANION GAP SERPL CALC-SCNC: 5 MMOL/L (ref 5–15)
BASOPHILS # BLD: 0 K/UL (ref 0–0.1)
BASOPHILS NFR BLD: 0 % (ref 0–1)
BLOOD GROUP ANTIBODIES SERPL: NORMAL
BUN SERPL-MCNC: 12 MG/DL (ref 6–20)
BUN/CREAT SERPL: 11 (ref 12–20)
CALCIUM SERPL-MCNC: 8.7 MG/DL (ref 8.5–10.1)
CHLORIDE SERPL-SCNC: 106 MMOL/L (ref 97–108)
CO2 SERPL-SCNC: 28 MMOL/L (ref 21–32)
CREAT SERPL-MCNC: 1.12 MG/DL (ref 0.7–1.3)
DIFFERENTIAL METHOD BLD: ABNORMAL
EOSINOPHIL # BLD: 0.1 K/UL (ref 0–0.4)
EOSINOPHIL NFR BLD: 1 % (ref 0–7)
ERYTHROCYTE [DISTWIDTH] IN BLOOD BY AUTOMATED COUNT: 12.6 % (ref 11.5–14.5)
GLUCOSE BLD STRIP.AUTO-MCNC: 116 MG/DL (ref 65–100)
GLUCOSE BLD STRIP.AUTO-MCNC: 119 MG/DL (ref 65–100)
GLUCOSE BLD STRIP.AUTO-MCNC: 147 MG/DL (ref 65–100)
GLUCOSE BLD STRIP.AUTO-MCNC: 162 MG/DL (ref 65–100)
GLUCOSE BLD STRIP.AUTO-MCNC: 180 MG/DL (ref 65–100)
GLUCOSE SERPL-MCNC: 149 MG/DL (ref 65–100)
HCT VFR BLD AUTO: 25.4 % (ref 36.6–50.3)
HGB BLD-MCNC: 8.3 G/DL (ref 12.1–17)
IMM GRANULOCYTES # BLD AUTO: 0 K/UL (ref 0–0.04)
IMM GRANULOCYTES NFR BLD AUTO: 0 % (ref 0–0.5)
LYMPHOCYTES # BLD: 1.4 K/UL (ref 0.8–3.5)
LYMPHOCYTES NFR BLD: 21 % (ref 12–49)
MAGNESIUM SERPL-MCNC: 2.1 MG/DL (ref 1.6–2.4)
MCH RBC QN AUTO: 28.7 PG (ref 26–34)
MCHC RBC AUTO-ENTMCNC: 32.7 G/DL (ref 30–36.5)
MCV RBC AUTO: 87.9 FL (ref 80–99)
MONOCYTES # BLD: 0.8 K/UL (ref 0–1)
MONOCYTES NFR BLD: 11 % (ref 5–13)
NEUTS SEG # BLD: 4.5 K/UL (ref 1.8–8)
NEUTS SEG NFR BLD: 67 % (ref 32–75)
NRBC # BLD: 0 K/UL (ref 0–0.01)
NRBC BLD-RTO: 0 PER 100 WBC
PLATELET # BLD AUTO: 316 K/UL (ref 150–400)
PMV BLD AUTO: 10 FL (ref 8.9–12.9)
POTASSIUM SERPL-SCNC: 3.8 MMOL/L (ref 3.5–5.1)
RBC # BLD AUTO: 2.89 M/UL (ref 4.1–5.7)
SERVICE CMNT-IMP: ABNORMAL
SODIUM SERPL-SCNC: 139 MMOL/L (ref 136–145)
SPECIMEN EXP DATE BLD: NORMAL
WBC # BLD AUTO: 6.8 K/UL (ref 4.1–11.1)

## 2019-06-18 PROCEDURE — 85025 COMPLETE CBC W/AUTO DIFF WBC: CPT

## 2019-06-18 PROCEDURE — 74011250636 HC RX REV CODE- 250/636: Performed by: PODIATRIST

## 2019-06-18 PROCEDURE — 74011636637 HC RX REV CODE- 636/637: Performed by: PODIATRIST

## 2019-06-18 PROCEDURE — 97116 GAIT TRAINING THERAPY: CPT

## 2019-06-18 PROCEDURE — 74011250637 HC RX REV CODE- 250/637: Performed by: PODIATRIST

## 2019-06-18 PROCEDURE — 74011000258 HC RX REV CODE- 258: Performed by: EMERGENCY MEDICINE

## 2019-06-18 PROCEDURE — 36415 COLL VENOUS BLD VENIPUNCTURE: CPT

## 2019-06-18 PROCEDURE — 74011250637 HC RX REV CODE- 250/637: Performed by: EMERGENCY MEDICINE

## 2019-06-18 PROCEDURE — 65270000015 HC RM PRIVATE ONCOLOGY

## 2019-06-18 PROCEDURE — 77010033678 HC OXYGEN DAILY

## 2019-06-18 PROCEDURE — 94760 N-INVAS EAR/PLS OXIMETRY 1: CPT

## 2019-06-18 PROCEDURE — 97530 THERAPEUTIC ACTIVITIES: CPT

## 2019-06-18 PROCEDURE — 80048 BASIC METABOLIC PNL TOTAL CA: CPT

## 2019-06-18 PROCEDURE — 83735 ASSAY OF MAGNESIUM: CPT

## 2019-06-18 PROCEDURE — 74011250636 HC RX REV CODE- 250/636: Performed by: EMERGENCY MEDICINE

## 2019-06-18 PROCEDURE — 82962 GLUCOSE BLOOD TEST: CPT

## 2019-06-18 RX ORDER — ENOXAPARIN SODIUM 100 MG/ML
40 INJECTION SUBCUTANEOUS EVERY 24 HOURS
Status: DISCONTINUED | OUTPATIENT
Start: 2019-06-18 | End: 2019-06-21 | Stop reason: HOSPADM

## 2019-06-18 RX ADMIN — INSULIN LISPRO 2 UNITS: 100 INJECTION, SOLUTION INTRAVENOUS; SUBCUTANEOUS at 19:16

## 2019-06-18 RX ADMIN — ASPIRIN 81 MG: 81 TABLET, COATED ORAL at 09:19

## 2019-06-18 RX ADMIN — ENOXAPARIN SODIUM 40 MG: 40 INJECTION SUBCUTANEOUS at 21:01

## 2019-06-18 RX ADMIN — INSULIN LISPRO 2 UNITS: 100 INJECTION, SOLUTION INTRAVENOUS; SUBCUTANEOUS at 12:00

## 2019-06-18 RX ADMIN — Medication 10 ML: at 14:48

## 2019-06-18 RX ADMIN — PIPERACILLIN SODIUM,TAZOBACTAM SODIUM 3.38 G: 3; .375 INJECTION, POWDER, FOR SOLUTION INTRAVENOUS at 06:11

## 2019-06-18 RX ADMIN — PIPERACILLIN SODIUM,TAZOBACTAM SODIUM 3.38 G: 3; .375 INJECTION, POWDER, FOR SOLUTION INTRAVENOUS at 21:02

## 2019-06-18 RX ADMIN — INSULIN LISPRO 2 UNITS: 100 INJECTION, SOLUTION INTRAVENOUS; SUBCUTANEOUS at 01:37

## 2019-06-18 RX ADMIN — Medication 10 ML: at 06:17

## 2019-06-18 RX ADMIN — PIPERACILLIN SODIUM,TAZOBACTAM SODIUM 3.38 G: 3; .375 INJECTION, POWDER, FOR SOLUTION INTRAVENOUS at 14:47

## 2019-06-18 RX ADMIN — Medication 1 CAPSULE: at 09:19

## 2019-06-18 RX ADMIN — Medication 10 ML: at 21:01

## 2019-06-18 NOTE — PROGRESS NOTES
Podiatry    Subjective: Pt has no new complaints. Patient is a 68 y.o.  male who is being seen for osteomyelitis right calcaneus. Patient Active Problem List    Diagnosis Date Noted    Diabetic foot ulcer ([facility] [address] 2019    [facility]    Controlled type 2 diabetes mellitus with skin complication, without long-term current use of insulin ([facility] [address] 2019     Past Medical History:   Diagnosis Date    Diabetes [facility]     DVT [facility]deep [facility] thrombosis) [facility] 2019    Right leg stent placed    Thromboembolus [facility])      Past Surgical History:   Procedure Laterality Date    VASCULAR SURGERY PROCEDURE UNLIST  2019    stents placed RLE (2)       History reviewed. No pertinent family history. Social History     Tobacco Use    Smoking status: Never Smoker    Smokeless tobacco: Never Used   Substance Use Topics    Alcohol use: Yes     Alcohol/week: 3.6 oz     Types: 6 Cans of beer per week     Comment: occ     No Known Allergies  Prior to Admission medications    Medication Sig Start Date End Date Taking? Authorizing Provider   metFORMIN (GLUCOPHAGE) 1,000 mg tablet Take 1,000 mg by mouth daily. Other, MD Simón       Review of Systems AO x3. NAD. Objective:     Patient Vitals for the past 8 hrs:   BP Temp Pulse Resp SpO2   19 1532 138/72 98.4 °F (36.9 °C) 60 16 96 %     Temp (24hrs), Av.5 °F (36.9 °C), Min:98.1 °F (36.7 °C), Max:98.9 °F (37.2 °C)      Physical Exam Lower Extremity  Still moderate erythema, edema and calor right foot and leg. VAC dressing secure in place. DP and PT palpable  VAC cannister has additional 50 ml of drainage over the past 24 hours.     Data Review:   Recent Results (from the past 24 hour(s))   GLUCOSE, POC    Collection Time: 19 12:30 AM   Result Value Ref Range    Glucose (POC) 162 (H) 65 - 100 mg/dL    Performed by Dorian Grider (PCT)    CBC WITH AUTOMATED DIFF    Collection Time: 19  1:46 AM Result Value Ref Range    WBC 6.8 4.1 - 11.1 K/uL    RBC 2.89 (L) 4.10 - 5.70 M/uL    HGB 8.3 (L) 12.1 - 17.0 g/dL    HCT 25.4 (L) 36.6 - 50.3 %    MCV 87.9 80.0 - 99.0 FL    MCH 28.7 26.0 - 34.0 PG    MCHC 32.7 30.0 - 36.5 g/dL    RDW 12.6 11.5 - 14.5 %    PLATELET 552 443 - 974 K/uL    MPV 10.0 8.9 - 12.9 FL    NRBC 0.0 0  WBC    ABSOLUTE NRBC 0.00 0.00 - 0.01 K/uL    NEUTROPHILS 67 32 - 75 %    LYMPHOCYTES 21 12 - 49 %    MONOCYTES 11 5 - 13 %    EOSINOPHILS 1 0 - 7 %    BASOPHILS 0 0 - 1 %    IMMATURE GRANULOCYTES 0 0.0 - 0.5 %    ABS. NEUTROPHILS 4.5 1.8 - 8.0 K/UL    ABS. LYMPHOCYTES 1.4 0.8 - 3.5 K/UL    ABS. MONOCYTES 0.8 0.0 - 1.0 K/UL    ABS. EOSINOPHILS 0.1 0.0 - 0.4 K/UL    ABS. BASOPHILS 0.0 0.0 - 0.1 K/UL    ABS. IMM.  GRANS. 0.0 0.00 - 0.04 K/UL    DF AUTOMATED     METABOLIC PANEL, BASIC    Collection Time: 06/18/19  1:46 AM   Result Value Ref Range    Sodium 139 136 - 145 mmol/L    Potassium 3.8 3.5 - 5.1 mmol/L    Chloride 106 97 - 108 mmol/L    CO2 28 21 - 32 mmol/L    Anion gap 5 5 - 15 mmol/L    Glucose 149 (H) 65 - 100 mg/dL    BUN 12 6 - 20 MG/DL    Creatinine 1.12 0.70 - 1.30 MG/DL    BUN/Creatinine ratio 11 (L) 12 - 20      GFR est AA >60 >60 ml/min/1.73m2    GFR est non-AA >60 >60 ml/min/1.73m2    Calcium 8.7 8.5 - 10.1 MG/DL   MAGNESIUM    Collection Time: 06/18/19  1:46 AM   Result Value Ref Range    Magnesium 2.1 1.6 - 2.4 mg/dL   GLUCOSE, POC    Collection Time: 06/18/19  6:11 AM   Result Value Ref Range    Glucose (POC) 119 (H) 65 - 100 mg/dL    Performed by Manuela Gomes    GLUCOSE, POC    Collection Time: 06/18/19 11:41 AM   Result Value Ref Range    Glucose (POC) 180 (H) 65 - 100 mg/dL    Performed by Sima Henderson    GLUCOSE, POC    Collection Time: 06/18/19  6:13 PM   Result Value Ref Range    Glucose (POC) 147 (H) 65 - 100 mg/dL    Performed by Tyler Bowie          Impression:   Osteomyelitis right calcaneus  Diabetic ulcer to bone right heel      Recommendation:   No current plans for additional Sx, bleeding controlled; I restarted the orders for Lovenox. Awaiting final bone margin from Sx on 6/17 to help determine duration of IV antibiotics.

## 2019-06-18 NOTE — PROGRESS NOTES
Problem: Mobility Impaired (Adult and Pediatric)  Goal: *Acute Goals and Plan of Care (Insert Text)  Description  Physical Therapy Goals  Initiated 6/15/2019  1. Patient will move from supine to sit and sit to supine , scoot up and down and roll side to side in bed with independence within 7 day(s). 2.  Patient will transfer from bed to chair and chair to bed with modified independence using the least restrictive device within 7 day(s). 3.  Patient will perform sit to stand with modified independence within 7 day(s). 4.  Patient will ambulate with modified independence for 200 feet with the least restrictive device within 7 day(s). 5.  Patient will ascend/descend 12 stairs with 1 handrail(s) with modified independence within 7 day(s). Outcome: Progressing Towards Goal   PHYSICAL THERAPY TREATMENT  Patient: Avelino Agiulera (38 y.o. male)  Date: 6/18/2019  Diagnosis: Acute osteomyelitis of right foot ([address] [M86.171]  Diabetic foot ulcer ([facility] [address] [W55.157, L97.509] <principal problem not specified>  Procedure(s) (LRB):  DEBRIDEMENT WOUND AND BONE RIGHT HEEL APPLICATION OF WOUND VAC INSERTION OF ABSORBABLE ANTIBIOTIC BEADS (Right) 1 Day Post-Op  Precautions: TTWB(TTWB R; no heel wb)  Chart, physical therapy assessment, plan of care and goals were reviewed. ASSESSMENT:  Patient lying in bed when PT arrived. Agreed to therapy and cleared by his nurse. Extra time needed due to patient moves very slowly and managing his wound vac during mobilization. Overall demonstrates improved strength, activity tolerance and mobility skills. Currently independent with supine to sit to supine transfers. Sit to stand is sba with set up for walker and wound-vac. Gait progressed to 150 feet with RW using consistent TTWB/no heel WB on RLE using step to step pattern. Needed cg to min a x 1 to assist with steering walker and tactile cues to maintain upright posture.  He was returned to bed and placed in chair position with R heel floated. Dinner tray positioned in front when the session ended. Recommend HH PT/OT with 24/7 supervision. Otherwise will need SNF level rehab. Progression toward goals:  ?    Improving appropriately and progressing toward goals  ? Improving slowly and progressing toward goals  ? Not making progress toward goals and plan of care will be adjusted     PLAN:  Patient continues to benefit from skilled intervention to address the above impairments. Continue treatment per established plan of care. Discharge Recommendations:  Home Health PT/OT  Further Equipment Recommendations for Discharge:  rolling walker     SUBJECTIVE:   Patient stated ? We should do this everyday. ?    OBJECTIVE DATA SUMMARY:   Critical Behavior:  Neurologic State: Alert  Orientation Level: Oriented X4  Cognition: Appropriate decision making, Appropriate for age attention/concentration, Appropriate safety awareness, Follows commands  Safety/Judgement: Awareness of environment, Good awareness of safety precautions  Functional Mobility Training:  Bed Mobility:  Rolling: Independent  Supine to Sit: Independent  Sit to Supine: Independent  Scooting: Independent        Transfers:  Sit to Stand: Stand-by assistance  Stand to Sit: Supervision        Bed to Chair: Contact guard assistance; Adaptive equipment(WB)                    Balance:  Sitting: Intact  Standing: Impaired  Standing - Static: Good;Constant support  Standing - Dynamic : Not tested  Ambulation/Gait Training:  Distance (ft): 150 Feet (ft)  Assistive Device: Walker, rolling;Gait belt  Ambulation - Level of Assistance: Contact guard assistance;Minimal assistance;Assist x1        Gait Abnormalities: Decreased step clearance; Step to gait(excessive fwd trunk lean)  Right Side Weight Bearing: Toe touch(RLE TTWB only; no heel WB)     Base of Support: Shift to left; Widened     Speed/Ana: Slow  Step Length: Left shortened                        Pain:  Pain Scale 1: Numeric (0 - 10)  Pain Intensity 1: 0              Activity Tolerance:   Good  Please refer to the flowsheet for vital signs taken during this treatment. After treatment:   ?    Patient left in no apparent distress sitting up in chair  ? Patient left in no apparent distress in bed  ? Call bell left within reach  ? Nursing notified  ? Caregiver present  ?     Bed alarm activated    COMMUNICATION/COLLABORATION:   The patient?s plan of care was discussed with: Registered Nurse    Lashaun Rosado, PT   Time Calculation: 24 mins

## 2019-06-18 NOTE — PROGRESS NOTES
Hospitalist Progress Note    NAME: Marcus Lema   :  1950   MRN:  [id number]       Assessment / Plan:    Acute Osteomyelitis of R Foot POA  R foot Cellulitis POA  R Diabetic Foot Ulcer- non healing POA  PAD s/p R leg stent at [place] per pt in 2019   Recent Wound Cx - growing Pseudomonas & enterococcal sp  MRI R foot noted- Acute OM of the calcaneum     Continue zosyn, vanc dced  per ID  Follow Blood Cx and wound cx/OR specimens- growing beta strep-p, ecoli  IP Podiatry consulted-s/p I&D  and   Cont home ASA daily  Appreciate ID input. Final Abx choice/route and duration to be determined. Will likely need PICC and rehab, lives alone  -continue wound vac per podiatry    Newly diagnosed DM type 2 POA- in 2019  FS Q AC & HS when eating, hold metformin, follow with ssi, a1c 7.9    CKD 2-3  Stable, follow    Elevated BP  -BP has been a bit high, not on meds at home  -likely stress related. Monitor    Anemia  -chronic + blood loss from surgery. Follow           Code Status: Full  Surrogate Decision Maker: friend Natalie Mcgovern Maker # [phone]     DVT Prophylaxis: Sq heparin  GI Prophylaxis: not indicated  Baseline: Pt lives with a friend, ambulatory    18.5 - 24.9 Normal weight / Body mass index is 24.01 kg/m². Recommended Disposition: TBD ? rehab for IV abx, wound vac, PT     Subjective:     Chief Complaint / Reason for Physician Visit  Foul smelling foot ulcer     Patient reports some tingling in his right foot but no pain and otherwise feels well. Normal appetite, no shortness of breath no chest pain. Plans are to go to the OR later today like to try to save his foot rather than amputation. He lives alone at home we discussed he may need IV antibiotics and rehab at discharge.  pt w/o c/os. No pain in foot. No sob/cp. On 02 NC since returning from OR     pt w/o c/os. Off o2 no sob. 6/15 no new c/os.  no new c/os.  Pain controlled     pt just returned from the OR, now with wound vac, red blood in tubing. Pt w/o c/os. No pain, not LH or dizzy or sob. 6/18 eating lunch. No new complaints. Wound vac in place        Discussed with RN events overnight. Review of Systems:  Symptom Y/N Comments  Symptom Y/N Comments   Fever/Chills    Chest Pain n    Poor Appetite n   Edema     Cough n   Abdominal Pain n    Sputum    Joint Pain     SOB/BLACK n   Pruritis/Rash     Nausea/vomit n   Tolerating PT/OT     Diarrhea    Tolerating Diet y    Constipation    Other       Could NOT obtain due to:      Objective:     VITALS:   Last 24hrs VS reviewed since prior progress note. Most recent are:  Patient Vitals for the past 24 hrs:   Temp Pulse Resp BP SpO2   06/18/19 0813 98.1 °F (36.7 °C) 63 16 162/84 98 %   06/17/19 2242 98.9 °F (37.2 °C) 62 18 138/71 100 %   06/17/19 1906 99.2 °F (37.3 °C) 68 18 112/63 100 %   06/17/19 1531 98.4 °F (36.9 °C) 67 18 143/68 97 %   06/17/19 1215  67 18 137/66 95 %       Intake/Output Summary (Last 24 hours) at 6/18/2019 1207  Last data filed at 6/18/2019 0200  Gross per 24 hour   Intake 600 ml   Output 1180 ml   Net -580 ml        PHYSICAL EXAM:  PHYSICAL EXAM:  General: WD, WN. Alert, cooperative, no acute distress    EENT:  EOMI. Anicteric sclerae. MMM  Resp:  CTA bilaterally, no wheezing or rales. No accessory muscle use  CV:  Regular  rhythm,  No edema  GI:  Soft, Non distended, Non tender. +Bowel sounds  Neurologic:  Alert and oriented X 3, normal speech,   Psych:   Good insight. Not anxious nor agitated  Skin:  No rashes.   No jaundice (+) right foot wound vac dressing        Reviewed most current lab test results and cultures  YES  Reviewed most current radiology test results   YES  Review and summation of old records today    NO  Reviewed patient's current orders and MAR    YES  PMH/SH reviewed - no change compared to H&P  ________________________________________________________________________  Care Plan discussed with: Comments   Patient y    Family      RN y    Care Manager     Consultant                        Multidiciplinary team rounds were held today with , nursing, pharmacist and clinical coordinator. Patient's plan of care was discussed; medications were reviewed and discharge planning was addressed. ________________________________________________________________________  Total NON critical care TIME:  25   Minutes    Total CRITICAL CARE TIME Spent:   Minutes non procedure based      Comments   >50% of visit spent in counseling and coordination of care     ________________________________________________________________________  Kirstin Glass MD     Procedures: see electronic medical records for all procedures/Xrays and details which were not copied into this note but were reviewed prior to creation of Plan. LABS:  I reviewed today's most current labs and imaging studies.   Pertinent labs include:  Recent Labs     06/18/19  0146 06/17/19  1524 06/17/19  0543   WBC 6.8  --  6.5   HGB 8.3* 9.7* 9.5*   HCT 25.4* 29.9* 29.7*     --  303     Recent Labs     06/18/19  0146 06/17/19  0543    139   K 3.8 3.6    106   CO2 28 27   * 112*   BUN 12 10   CREA 1.12 1.05   CA 8.7 8.8   MG 2.1 2.0       Signed: Kirstin Glass MD

## 2019-06-18 NOTE — PROGRESS NOTES
Infectious Disease Progress Note        Mr Ramez Jc seen  Tolerating IV antibiotics, denied GI upset, diarrhea , fever, chills, rash  Pain controlled per him post op       Current Facility-Administered Medications:     lactobac ac& pc-s.rosette-b.anim (JENNIFER Q/RISAQUAD), 1 Cap, Oral, DAILY, Antony Chang MD, 1 Cap at 06/18/19 0919    oxyCODONE-acetaminophen (PERCOCET 7.5) 7.5-325 mg per tablet 1 Tab, 1 Tab, Oral, Q6H PRN, Belock, Fe Naren, DPM, 1 Tab at 06/16/19 1938    morphine injection 2 mg, 2 mg, IntraVENous, Q3H PRN, Belock, Fe Naren, DPM, 2 mg at 06/17/19 2122    ondansetron (ZOFRAN) injection 4 mg, 4 mg, IntraVENous, Q4H PRN, Belock, Fe Naren, DPM    piperacillin-tazobactam (ZOSYN) 3.375 g in 0.9% sodium chloride (MBP/ADV) 100 mL, 3.375 g, IntraVENous, Q8H, Dania Reynolds MD, Last Rate: 25 mL/hr at 06/18/19 0611, 3.375 g at 06/18/19 0611    sodium chloride (NS) flush 5-40 mL, 5-40 mL, IntraVENous, Q8H, Belock, Fe Naren, DPM, 10 mL at 06/18/19 0617    sodium chloride (NS) flush 5-40 mL, 5-40 mL, IntraVENous, PRN, Belock, Fe Naren, DPM    ondansetron (ZOFRAN) injection 4 mg, 4 mg, IntraVENous, Q4H PRN, Belock, Fe Naren, DPM    insulin lispro (HUMALOG) injection, , SubCUTAneous, Q6H, Belock, Fe Naren, DPM, 2 Units at 06/18/19 1200    glucose chewable tablet 16 g, 4 Tab, Oral, PRN, Belock, Fe Naren, DPM    glucagon (GLUCAGEN) injection 1 mg, 1 mg, IntraMUSCular, PRN, Belock, Fe Naren, DPM    aspirin delayed-release tablet 81 mg, 81 mg, Oral, DAILY, Belock, Fe Naren, DPM, 81 mg at 06/18/19 0919        Physical Exam:    Vitals:   Patient Vitals for the past 24 hrs:   Temp Pulse Resp BP SpO2   06/18/19 0813 98.1 °F (36.7 °C) 63 16 162/84 98 %   06/17/19 2242 98.9 °F (37.2 °C) 62 18 138/71 100 %   06/17/19 1906 99.2 °F (37.3 °C) 68 18 112/63 100 %   06/17/19 1531 98.4 °F (36.9 °C) 67 18 143/68 97 %     · GEN: NAD  · HEENT:no scleral icterus,  no thrush  · CV: S1, S2 heard regularly  · Lungs: Clear to auscultation bilaterally  · Abdomen: soft, non distended, non tender,   · Extremities: no edema  · Neuro: Alert, oriented to self, place and situation, moves all extremities to commands, verbal   · Skin: no rash  MSK: + Rfoot with wound vac, no erythema seen, slight edema, warmth felt       Labs:   Recent Results (from the past 24 hour(s))   HGB & HCT    Collection Time: 06/17/19  3:24 PM   Result Value Ref Range    HGB 9.7 (L) 12.1 - 17.0 g/dL    HCT 29.9 (L) 36.6 - 50.3 %   TYPE & SCREEN    Collection Time: 06/17/19  3:24 PM   Result Value Ref Range    Crossmatch Expiration 06/20/2019     ABO/Rh(D) B POSITIVE     Antibody screen NEG    GLUCOSE, POC    Collection Time: 06/17/19  5:39 PM   Result Value Ref Range    Glucose (POC) 144 (H) 65 - 100 mg/dL    Performed by Priya Singh    GLUCOSE, POC    Collection Time: 06/18/19 12:30 AM   Result Value Ref Range    Glucose (POC) 162 (H) 65 - 100 mg/dL    Performed by Neptali Quevedo (PCT)    CBC WITH AUTOMATED DIFF    Collection Time: 06/18/19  1:46 AM   Result Value Ref Range    WBC 6.8 4.1 - 11.1 K/uL    RBC 2.89 (L) 4.10 - 5.70 M/uL    HGB 8.3 (L) 12.1 - 17.0 g/dL    HCT 25.4 (L) 36.6 - 50.3 %    MCV 87.9 80.0 - 99.0 FL    MCH 28.7 26.0 - 34.0 PG    MCHC 32.7 30.0 - 36.5 g/dL    RDW 12.6 11.5 - 14.5 %    PLATELET 547 332 - 809 K/uL    MPV 10.0 8.9 - 12.9 FL    NRBC 0.0 0  WBC    ABSOLUTE NRBC 0.00 0.00 - 0.01 K/uL    NEUTROPHILS 67 32 - 75 %    LYMPHOCYTES 21 12 - 49 %    MONOCYTES 11 5 - 13 %    EOSINOPHILS 1 0 - 7 %    BASOPHILS 0 0 - 1 %    IMMATURE GRANULOCYTES 0 0.0 - 0.5 %    ABS. NEUTROPHILS 4.5 1.8 - 8.0 K/UL    ABS. LYMPHOCYTES 1.4 0.8 - 3.5 K/UL    ABS. MONOCYTES 0.8 0.0 - 1.0 K/UL    ABS. EOSINOPHILS 0.1 0.0 - 0.4 K/UL    ABS. BASOPHILS 0.0 0.0 - 0.1 K/UL    ABS. IMM.  GRANS. 0.0 0.00 - 0.04 K/UL    DF AUTOMATED     METABOLIC PANEL, BASIC    Collection Time: 06/18/19  1:46 AM   Result Value Ref Range    Sodium 139 136 - 145 mmol/L    Potassium 3.8 3.5 - 5.1 mmol/L Chloride 106 97 - 108 mmol/L    CO2 28 21 - 32 mmol/L    Anion gap 5 5 - 15 mmol/L    Glucose 149 (H) 65 - 100 mg/dL    BUN 12 6 - 20 MG/DL    Creatinine 1.12 0.70 - 1.30 MG/DL    BUN/Creatinine ratio 11 (L) 12 - 20      GFR est AA >60 >60 ml/min/1.73m2    GFR est non-AA >60 >60 ml/min/1.73m2    Calcium 8.7 8.5 - 10.1 MG/DL   MAGNESIUM    Collection Time: 06/18/19  1:46 AM   Result Value Ref Range    Magnesium 2.1 1.6 - 2.4 mg/dL   GLUCOSE, POC    Collection Time: 06/18/19  6:11 AM   Result Value Ref Range    Glucose (POC) 119 (H) 65 - 100 mg/dL    Performed by Kaelyn Reilly    GLUCOSE, POC    Collection Time: 06/18/19 11:41 AM   Result Value Ref Range    Glucose (POC) 180 (H) 65 - 100 mg/dL    Performed by Laura Brasher        Microbiology Data:       Blood:6/11/19         Component Value Ref Range & Units Status   Special Requests: NO SPECIAL REQUESTS    Final   Culture result: NO GROWTH 5 DAYS    Final   Result History         Bone 6/12/19  Bone         Component Value Ref Range & Units Status   Special Requests: NO SPECIAL REQUESTS    Final   GRAM STAIN OCCASIONAL WBCS SEEN    Final   GRAM STAIN    Final   RARE APPARENT INTRACELLULAR GRAM POSITIVE COCCI IN PAIRS    Culture result: SCANT ESCHERICHIA COLIAbnormal     Final   Culture result: Abnormal     Final   SCANT STREPTOCOCCI, BETA HEMOLYTIC GROUP B Penicillin and ampicillin are drugs of choice for treatment of beta-hemolytic streptococcal infections. Susceptibility testing of penicillins and beta-lactams approved by the FDA for treatment of beta-hemolytic streptococcal infections need not be performed routinely, because nonsusceptible isolates are extremely rare.  CLSI 2012   Culture result:   Final   (PLEASE REFER TO Renée More [id number] FOR ANAEROBIC GROWTH)    Susceptibility      Escherichia coli     YASMINE    Amikacin ($) <=16 ug/mL S    Ampicillin ($) >16 ug/mL R    Ampicillin/sulbactam ($) >16/8 ug/mL R    Aztreonam ($$$$) <=4 ug/mL S    Cefazolin ($) <=8 ug/mL S Cefepime ($$) <=4 ug/mL S    Cefotaxime <=2 ug/mL S    Ceftazidime ($) <=1 ug/mL S    Ceftriaxone ($) <=1 ug/mL S    Cefuroxime ($) <=4 ug/mL S    Ciprofloxacin ($) <=1 ug/mL S    Gentamicin ($) >8 ug/mL R    Imipenem <=1 ug/mL S    Levofloxacin ($) <=2 ug/mL S    Meropenem ($$) <=1 ug/mL S    Piperacillin/Tazobac ($) <=16 ug/mL S    Tobramycin ($) >8 ug/mL R    Trimeth/Sulfa >2/38 ug/mL R          Result History       Component Value Ref Range & Units Status   Special Requests: HEEL   Final   Culture result: Abnormal     Final   SCANT STREPTOCOCCI, BETA HEMOLYTIC GROUP B Penicillin and ampicillin are drugs of choice for treatment of beta-hemolytic streptococcal infections. Susceptibility testing of penicillins and beta-lactams approved by the FDA for treatment of beta-hemolytic streptococcal infections need not be performed routinely, because nonsusceptible isolates are extremely rare. CLSI 2012   Culture result: Abnormal     Final   LIGHT MIXED ANAEROBIC GRAM NEGATIVE RODS BETA LACTAMASE NEGATIVE                Component Value Ref Range & Units Status   Special Requests: HEEL   Preliminary   Culture result: NO FUNGUS ISOLATED 5 DAYS    Preliminary   Result History       Wound foot  6/8/19          Component Value Ref Range & Units Status   Special Requests: NO SPECIAL REQUESTS    Final   GRAM STAIN OCCASIONAL WBCS SEEN    Final   GRAM STAIN 3+ GRAM NEGATIVE RODS    Final   Culture result: Abnormal     Final   MODERATE ENTEROCOCCUS FAECALIS GROUP D    Culture result: Abnormal     Final   LIGHT STREPTOCOCCI, BETA HEMOLYTIC GROUP B . Penicillin and ampicillin are drugs of choice for treatment of beta-hemolytic streptococcal infections. Susceptibility testing of penicillins and beta-lactams approved by the FDA for treatment of beta-hemolytic streptococcal infections need not be performed routinely, because nonsusceptible isolates are extremely rare.  CLSI 2012   Culture result: FEW PSEUDOMONAS AERUGINOSAAbnormal     Final   Susceptibility      Enterococcus  faecalis group D Pseudomonas aeruginosa     YASMINE YASMINE    Amikacin ($)   <=16 ug/mL S    Ampicillin ($) <=2 ug/mL S      Aztreonam ($$$$)   <=4 ug/mL S    Cefepime ($$)   <=4 ug/mL S    Ceftazidime ($)   <=1 ug/mL S    Ciprofloxacin ($)   <=1 ug/mL S    Daptomycin ($$$$$) 1 ug/mL S      Gentamicin ($)   <=4 ug/mL S    Imipenem   <=1 ug/mL S    Levofloxacin ($)   <=2 ug/mL S    Linezolid ($$$$$) 2 ug/mL S      Meropenem ($$)   <=1 ug/mL S    Penicillin G ($$) 2 ug/mL S      Piperacillin/Tazobac ($)   <=16 ug/mL S    Tobramycin ($)   <=4 ug/mL S    Vancomycin ($) 2 ug/mL S              Result History         Imaging:     MRI R foot   FINDINGS: Bone marrow: There is a small erosion of the posterior lateral  inferior calcaneus with underlying enhancement and edema and decreased T1 signal  consistent with acute osteomyelitis.     Joint fluid:  None.     Tendons: Intact.     Muscles: Mild edema in the musculature likely related to diabetic neuropathy.     Neurovascular bundles: Within normal limits.     Articular cartilage:Intact without focal osteochondral lesion.     Soft tissue mass: There is a soft tissue ulcer of the posterior lateral healed  extending to near to the calcaneus. There is nonspecific subcutaneous edema  surrounding the foot and ankle.     IMPRESSION  IMPRESSION:   Ulceration of the posterior lateral heel with acute osteomyelitis in the  posterior lateral inferior calcaneus.       DASIA 6/11/19  Interpretation Summary     · The right resting DASIA is normal.  · The left resting DASIA is mildly decreased. Arterial disease noted at the level of the femoral artery, popliteal and tibial artery on the left side. · Significant disease in the left pedial arch and digits. · The left TBI is decreased to 0.22. · The right TBI is 0.52. X ray R foot 6/8/19     FINDINGS: Three views of the right foot. There is ulceration of the plantar  aspect of the heel.  Moderate degenerative changes are seen in the tibiotalar  joint. There is a pes planus deformity with mild degenerative changes in the  midfoot. There is a type II accessory navicular bone. There is a hallux valgus  deformity. No acute fracture or dislocation. No evidence of osteomyelitis. There  is mild soft tissue swelling of the dorsum of the foot.     IMPRESSION  IMPRESSION:   1. Ulceration of the plantar aspect of the heel without evidence of  osteomyelitis. 2. Mild soft tissue swelling of the dorsum of the foot. 3. Degenerative changes. Assessment / Plan:       Mr Jamel Suarez is a 68year old gentleman with hx of DM, long standing wound on R heel area admitted with necrotic wound and osteomyelitis. 1) R foot osteomyelitis:  S/P debridement bone /wound R calcaneous on 6/12/19 and again 6/17/19  Cultures of bone with  Strep, Ecoli, ( mixed anaerobic GNR beta lactamase negative labelled as wound )  Cultures wound with Pseudomonas and E faecalis   Continue Zosyn IV renally dosed   Final duration, choice and route of antibiotics to be determined pending pathology   If residual osteomyelitis suspected and bone pathology margins are + for osteomyelitis, needs 6-8 weeks IV antibiotics   If no residual osteomyelitis suspected and bone margins are negative, then would not given long term antibiotics   Probiotics/yogurt encouraged   Offloading pressure per Podiatry   Pain control per primary team       2) DM  A1C 7.9     3) Screening: HIV and Hep C negative     4) DVT px        Thank for the opportunity to participate in the care of this patient. Please contact with questions or concerns.        Konrad Ferguson DO  1:57 PM

## 2019-06-18 NOTE — PROGRESS NOTES
Spiritual Care Assessment/Progress Note  [facility]      NAME: Vin Campos      MRN: [id number]  AGE: 68 y.o. SEX: male  Anabaptist Affiliation: Confucianist   Language: English     6/18/2019     Total Time (in minutes): 7     Spiritual Assessment begun in [facility] through conversation with:         []Patient        [] Family    [] Friend(s)        Reason for Consult: Initial/Spiritual assessment, patient floor     Spiritual beliefs: (Please include comment if needed)     [] Identifies with a john tradition:         [] Supported by a john community:            [] Claims no spiritual orientation:           [] Seeking spiritual identity:                [] Adheres to an individual form of spirituality:           [x] Not able to assess:                           Identified resources for coping:      [] Prayer                               [] Music                  [] Guided Imagery     [] Family/friends                 [] Pet visits     [] Devotional reading                         [x] Unknown     [] Other:                                               Interventions offered during this visit: (See comments for more details)    Patient Interventions: Other (comment)(Pastoral Care Note)           Plan of Care:     [] Support spiritual and/or cultural needs    [] Support AMD and/or advance care planning process      [] Support grieving process   [] Coordinate Rites and/or Rituals    [] Coordination with community clergy   [] No spiritual needs identified at this time   [] Detailed Plan of Care below (See Comments)  [] Make referral to Music Therapy  [] Make referral to Pet Therapy     [] Make referral to Addiction services  [] Make referral to [facility]  [] Make referral to Spiritual Care Partner  [] No future visits requested        [x] Follow up visits as needed     Comments:Attempted Initial Spiritual Assessment in Oncology. Unable to assess patients needs.   No family present at this time. Left Pastoral Care note. Chaplains will follow as able and/or as needed. [address] Paging Service [phone]

## 2019-06-18 NOTE — PROGRESS NOTES
Oncology End of Shift Note      Bedside shift change report given to Misael Michelle (incoming nurse) by Meka Reyes and Magaly De Oliveira (outgoing nurse) on [facility]. Report included the following information SBAR, Kardex, Intake/Output and MAR. Shift Summary: Wound vac turned off during day shift for 3 hours per order. Wound vac turned on at beginning of night shift. Minimal output from wound vac. Labs drawn. PRN morphine x1. Sliding scale coverage given at midnight. Patient remained stable through the night. Issues for Physician to Address:       Patient on Cardiac Monitoring?     [] Yes  [x] No    Rhythm:          Mili Adkins

## 2019-06-18 NOTE — PROGRESS NOTES
Oncology End of Shift Note      Bedside shift change report given to [facility], RN (incoming nurse) by Stephan Osuna RN (outgoing nurse) on [facility]. Report included the following information SBAR. Shift Summary:   Pt working with PT and getting IV abx. Wound vaq draining scant dark red blood. Pt eating well and required 2 doses of insulin      Issues for Physician to Address:  Switch insulin to ACHS     Patient on Cardiac Monitoring?     [] Yes  [x] No    Rhythm:          Stephan Osuna RN

## 2019-06-18 NOTE — WOUND CARE
Wound care nurses consulted by Dr Allie Arriola after OR VAC placement to right heel 6/17 stating \" Wound VAC dressing changes; Adaptic, Black Granufoam, -125 mmHg. Do not remove the implanted antibiotic beads, do not replace any that fall out of wound\". Vac check shows that there is aprox 350 ml's of sanguinous drainage in cannister and patient state that the \" Told them to turn it down and put it on intermittent\". Settings at -50 mm/hg, intermittent (10 mins on and 10 mins off). Seal is occlusive and staff states that he has put out a lot less drainage since settings changed. Will reach out to Dr Allie Arriola to see when he wants dressing changed MWF vs. Mon and Thurs. And await further instructions on settings.  
 
Cintia Arora RN, [employer]

## 2019-06-19 LAB
ABO + RH BLD: NORMAL
ANION GAP SERPL CALC-SCNC: 7 MMOL/L (ref 5–15)
BLOOD GROUP ANTIBODIES SERPL: NORMAL
BUN SERPL-MCNC: 15 MG/DL (ref 6–20)
BUN/CREAT SERPL: 13 (ref 12–20)
CALCIUM SERPL-MCNC: 8.8 MG/DL (ref 8.5–10.1)
CHLORIDE SERPL-SCNC: 106 MMOL/L (ref 97–108)
CO2 SERPL-SCNC: 27 MMOL/L (ref 21–32)
CREAT SERPL-MCNC: 1.15 MG/DL (ref 0.7–1.3)
GLUCOSE BLD STRIP.AUTO-MCNC: 104 MG/DL (ref 65–100)
GLUCOSE BLD STRIP.AUTO-MCNC: 115 MG/DL (ref 65–100)
GLUCOSE BLD STRIP.AUTO-MCNC: 146 MG/DL (ref 65–100)
GLUCOSE BLD STRIP.AUTO-MCNC: 156 MG/DL (ref 65–100)
GLUCOSE SERPL-MCNC: 99 MG/DL (ref 65–100)
HCT VFR BLD AUTO: 29.2 % (ref 36.6–50.3)
HGB BLD-MCNC: 9.2 G/DL (ref 12.1–17)
POTASSIUM SERPL-SCNC: 3.7 MMOL/L (ref 3.5–5.1)
RETICS # AUTO: 0.05 M/UL (ref 0.03–0.1)
RETICS/RBC NFR AUTO: 1.5 % (ref 0.7–2.1)
SERVICE CMNT-IMP: ABNORMAL
SODIUM SERPL-SCNC: 140 MMOL/L (ref 136–145)
SPECIMEN EXP DATE BLD: NORMAL

## 2019-06-19 PROCEDURE — 86900 BLOOD TYPING SEROLOGIC ABO: CPT

## 2019-06-19 PROCEDURE — 82962 GLUCOSE BLOOD TEST: CPT

## 2019-06-19 PROCEDURE — 65270000015 HC RM PRIVATE ONCOLOGY

## 2019-06-19 PROCEDURE — 36415 COLL VENOUS BLD VENIPUNCTURE: CPT

## 2019-06-19 PROCEDURE — 85018 HEMOGLOBIN: CPT

## 2019-06-19 PROCEDURE — 85045 AUTOMATED RETICULOCYTE COUNT: CPT

## 2019-06-19 PROCEDURE — 94760 N-INVAS EAR/PLS OXIMETRY 1: CPT

## 2019-06-19 PROCEDURE — 74011250637 HC RX REV CODE- 250/637: Performed by: PODIATRIST

## 2019-06-19 PROCEDURE — 74011250636 HC RX REV CODE- 250/636: Performed by: PODIATRIST

## 2019-06-19 PROCEDURE — 74011250637 HC RX REV CODE- 250/637: Performed by: EMERGENCY MEDICINE

## 2019-06-19 PROCEDURE — 74011250636 HC RX REV CODE- 250/636: Performed by: EMERGENCY MEDICINE

## 2019-06-19 PROCEDURE — 77030037878 HC DRSG MEPILEX >48IN BORD MOLN -B

## 2019-06-19 PROCEDURE — 74011636637 HC RX REV CODE- 636/637: Performed by: INTERNAL MEDICINE

## 2019-06-19 PROCEDURE — 80048 BASIC METABOLIC PNL TOTAL CA: CPT

## 2019-06-19 PROCEDURE — 74011000258 HC RX REV CODE- 258: Performed by: EMERGENCY MEDICINE

## 2019-06-19 PROCEDURE — 77030011256 HC DRSG MEPILEX <16IN NO BORD MOLN -A

## 2019-06-19 RX ORDER — INSULIN LISPRO 100 [IU]/ML
INJECTION, SOLUTION INTRAVENOUS; SUBCUTANEOUS
Status: DISCONTINUED | OUTPATIENT
Start: 2019-06-19 | End: 2019-06-21 | Stop reason: HOSPADM

## 2019-06-19 RX ADMIN — OXYCODONE HYDROCHLORIDE AND ACETAMINOPHEN 1 TABLET: 7.5; 325 TABLET ORAL at 20:11

## 2019-06-19 RX ADMIN — INSULIN LISPRO 2 UNITS: 100 INJECTION, SOLUTION INTRAVENOUS; SUBCUTANEOUS at 12:02

## 2019-06-19 RX ADMIN — Medication 10 ML: at 14:00

## 2019-06-19 RX ADMIN — PIPERACILLIN SODIUM,TAZOBACTAM SODIUM 3.38 G: 3; .375 INJECTION, POWDER, FOR SOLUTION INTRAVENOUS at 21:37

## 2019-06-19 RX ADMIN — Medication 10 ML: at 05:31

## 2019-06-19 RX ADMIN — ASPIRIN 81 MG: 81 TABLET, COATED ORAL at 09:11

## 2019-06-19 RX ADMIN — PIPERACILLIN SODIUM,TAZOBACTAM SODIUM 3.38 G: 3; .375 INJECTION, POWDER, FOR SOLUTION INTRAVENOUS at 13:47

## 2019-06-19 RX ADMIN — Medication 1 CAPSULE: at 09:11

## 2019-06-19 RX ADMIN — PIPERACILLIN SODIUM,TAZOBACTAM SODIUM 3.38 G: 3; .375 INJECTION, POWDER, FOR SOLUTION INTRAVENOUS at 05:30

## 2019-06-19 RX ADMIN — OXYCODONE HYDROCHLORIDE AND ACETAMINOPHEN 1 TABLET: 7.5; 325 TABLET ORAL at 06:31

## 2019-06-19 RX ADMIN — ONDANSETRON 4 MG: 2 INJECTION INTRAMUSCULAR; INTRAVENOUS at 21:37

## 2019-06-19 RX ADMIN — ENOXAPARIN SODIUM 40 MG: 40 INJECTION SUBCUTANEOUS at 20:11

## 2019-06-19 NOTE — PROGRESS NOTES
Podiatry    Subjective: No new complaints. No pain right foot. Patient is a 68 y.o.  male who is being seen for osteomyelitis right calcaneus, wound to bone right heel. Patient Active Problem List    Diagnosis Date Noted    Diabetic foot ulcer ([facility] 2019    Acute osteomyelitis of right foot ([facility] [address] [facility] 2019    Controlled type 2 diabetes mellitus with skin complication, without long-term current use of insulin ([facility] [address] [facility] 2019     Past Medical History:   Diagnosis Date    Diabetes ([facility] [address] [facility]     DVT (deep venous thrombosis) ([facility] [address] [facility] 2019    Right leg stent placed    Thromboembolus [facility])      Past Surgical History:   Procedure Laterality Date    VASCULAR SURGERY PROCEDURE UNLIST  2019    stents placed RLE (2)       History reviewed. No pertinent family history. Social History     Tobacco Use    Smoking status: Never Smoker    Smokeless tobacco: Never Used   Substance Use Topics    Alcohol use: Yes     Alcohol/week: 3.6 oz     Types: 6 Cans of beer per week     Comment: occ     No Known Allergies  Prior to Admission medications    Medication Sig Start Date End Date Taking? Authorizing Provider   metFORMIN (GLUCOPHAGE) 1,000 mg tablet Take 1,000 mg by mouth daily. Other, MD Simón       Review of Systems AO x3. NAD. Objective:     Patient Vitals for the past 8 hrs:   BP Temp Pulse Resp SpO2   19 1549 152/79 98.8 °F (37.1 °C) 66 16 100 %     Temp (24hrs), Av.6 °F (37 °C), Min:98.1 °F (36.7 °C), Max:98.8 °F (37.1 °C)      Physical Exam Lower Extremity  Still erythema, edema and calor right foot, but reduced compared to yesterday. Wound VAC dressing in place, has not been changed. No significant additional drainage in canister. DP and PT palpable right foot    Pathology: Bone margin from most recent debridement is normal cancellous bone. (ie clean margins).     Data Review:   Recent Results (from the past 24 hour(s))   GLUCOSE, POC    Collection Time: 19  6:13 PM   Result Value Ref Range    Glucose (POC) 147 (H) 65 - 100 mg/dL    Performed by Diamond Hubbard    GLUCOSE, POC    Collection Time: 06/18/19 11:41 PM   Result Value Ref Range    Glucose (POC) 116 (H) 65 - 100 mg/dL    Performed by [address] [place] [address] ([facility]) PCT    TYPE & SCREEN    Collection Time: 06/19/19  3:44 AM   Result Value Ref Range    Crossmatch Expiration 06/22/2019     ABO/Rh(D) B POSITIVE     Antibody screen NEG    HGB & HCT    Collection Time: 06/19/19  3:46 AM   Result Value Ref Range    HGB 9.2 (L) 12.1 - 17.0 g/dL    HCT 29.2 (L) 36.6 - 61.1 %   METABOLIC PANEL, BASIC    Collection Time: 06/19/19  3:46 AM   Result Value Ref Range    Sodium 140 136 - 145 mmol/L    Potassium 3.7 3.5 - 5.1 mmol/L    Chloride 106 97 - 108 mmol/L    CO2 27 21 - 32 mmol/L    Anion gap 7 5 - 15 mmol/L    Glucose 99 65 - 100 mg/dL    BUN 15 6 - 20 MG/DL    Creatinine 1.15 0.70 - 1.30 MG/DL    BUN/Creatinine ratio 13 12 - 20      GFR est AA >60 >60 ml/min/1.73m2    GFR est non-AA >60 >60 ml/min/1.73m2    Calcium 8.8 8.5 - 10.1 MG/DL   RETICULOCYTE COUNT    Collection Time: 06/19/19  3:46 AM   Result Value Ref Range    Reticulocyte count 1.5 0.7 - 2.1 %    Absolute Retic Cnt. 0.0459 0.0260 - 0.0950 M/ul   GLUCOSE, POC    Collection Time: 06/19/19  5:32 AM   Result Value Ref Range    Glucose (POC) 104 (H) 65 - 100 mg/dL    Performed by [address] [place] [address] ([facility]) PCT    GLUCOSE, POC    Collection Time: 06/19/19 11:41 AM   Result Value Ref Range    Glucose (POC) 146 (H) 65 - 100 mg/dL    Performed by Tereza Blair    GLUCOSE, POC    Collection Time: 06/19/19  3:57 PM   Result Value Ref Range    Glucose (POC) 115 (H) 65 - 100 mg/dL    Performed by Tereza Blair          Impression:   1. Cellulitis right foot, slightly improved  2. Open wound to bone right heel  3.  Osteomyelitis right calcaneus, S/P debridement      Recommendation:   The clean bone margin is favorable, and the pt could have a shorter course of IV antibiotics, but to be determined by Infectious Disease service. Continue the wound VAC. This should be changed tomorrow since it was not changed today. The bleeding seems to be over, so the full suction of -125 mmHg can be used with black granufoam. Pt will likely need the wound VAC for 2-3 months due to the depth of the wound. Protected weight bearing right foot. I will bring a CAM boot from my office for the patient tomorrow.

## 2019-06-19 NOTE — PROGRESS NOTES
Hospitalist Progress Note    NAME: Mani Myles   :  1950   MRN:  [id number]       Assessment / Plan:    Acute Osteomyelitis of R Foot POA  R foot Cellulitis POA  R Diabetic Foot Ulcer- non healing POA  PAD s/p R leg stent at [place] per pt in 2019   Recent Wound Cx - growing Pseudomonas & enterococcal sp  MRI R foot noted- Acute OM of the calcaneum     Continue zosyn, vanc dced  per ID  Follow Blood Cx and wound cx/OR specimens- growing beta strep-p, ecoli  IP Podiatry consulted-s/p I&D  and   Cont home ASA daily  Appreciate ID input. Final Abx choice/route and duration to be determined -waiting for bone margin biopsy results.    -continue wound vac per podiatry    Addendum:  Path :  Bone, submitted as \"bone margin, right calcaneus osteomyelitis\", biopsy:   Negative for acute osteomyelitis     Wait for final ID and podiatry recommendations. Newly diagnosed DM type 2 POA- in 2019  FS Q AC & HS when eating, hold metformin, follow with ssi, a1c 7.9    CKD 2-3  Stable, follow    Elevated BP  -BP has been a bit high, not on meds at home  -likely stress related. Monitor    Anemia  -chronic + blood loss from surgery. Follow - Hg stable           Code Status: Full  Surrogate Decision Maker: friend Natalie Gomes # [phone]     DVT Prophylaxis: Sq heparin  GI Prophylaxis: not indicated  Baseline: Pt lives with a friend, ambulatory    18.5 - 24.9 Normal weight / Body mass index is 24.01 kg/m². Recommended Disposition: TBD ? rehab for IV abx, wound vac, PT     Subjective:     Chief Complaint / Reason for Physician Visit  Foul smelling foot ulcer     Patient reports some tingling in his right foot but no pain and otherwise feels well. Normal appetite, no shortness of breath no chest pain. Plans are to go to the OR later today like to try to save his foot rather than amputation. He lives alone at home we discussed he may need IV antibiotics and rehab at discharge.      pt w/o c/os. No pain in foot. No sob/cp. On 02 NC since returning from OR    6/14 pt w/o c/os. Off o2 no sob. 6/15 no new c/os. 6/16 no new c/os. Pain controlled    6/17 pt just returned from the OR, now with wound vac, red blood in tubing. Pt w/o c/os. No pain, not LH or dizzy or sob. 6/18 eating lunch. No new complaints. Wound vac in place  6/19 no new complaints. Discussed with RN events overnight. Review of Systems:  Symptom Y/N Comments  Symptom Y/N Comments   Fever/Chills    Chest Pain n    Poor Appetite n   Edema     Cough n   Abdominal Pain n    Sputum    Joint Pain     SOB/BLACK n   Pruritis/Rash     Nausea/vomit n   Tolerating PT/OT     Diarrhea    Tolerating Diet y    Constipation    Other       Could NOT obtain due to:      Objective:     VITALS:   Last 24hrs VS reviewed since prior progress note. Most recent are:  Patient Vitals for the past 24 hrs:   Temp Pulse Resp BP SpO2   06/19/19 0752 98.1 °F (36.7 °C) 66 16 137/61 99 %   06/18/19 2236 98.8 °F (37.1 °C) 75 16 136/86 100 %   06/18/19 1937 98.8 °F (37.1 °C) 62 16 146/76 100 %   06/18/19 1532 98.4 °F (36.9 °C) 60 16 138/72 96 %       Intake/Output Summary (Last 24 hours) at 6/19/2019 1402  Last data filed at 6/19/2019 0752  Gross per 24 hour   Intake    Output 1300 ml   Net -1300 ml        PHYSICAL EXAM:  PHYSICAL EXAM:  General: WD, WN. Alert, cooperative, no acute distress    EENT:  EOMI. Anicteric sclerae. MMM  Resp:  CTA bilaterally, no wheezing or rales. No accessory muscle use  CV:  Regular  rhythm,  No edema  GI:  Soft, Non distended, Non tender. +Bowel sounds  Neurologic:  Alert and oriented X 3, normal speech,   Psych:   Good insight. Not anxious nor agitated  Skin:  No rashes.   No jaundice (+) right foot wound vac dressing        Reviewed most current lab test results and cultures  YES  Reviewed most current radiology test results   YES  Review and summation of old records today    NO  Reviewed patient's current orders and MAR    YES  PMH/SH reviewed - no change compared to H&P  ________________________________________________________________________  Care Plan discussed with:    Comments   Patient y    Family      RN y    Care Manager     Consultant                        Multidiciplinary team rounds were held today with , nursing, pharmacist and clinical coordinator. Patient's plan of care was discussed; medications were reviewed and discharge planning was addressed. ________________________________________________________________________  Total NON critical care TIME:  25   Minutes    Total CRITICAL CARE TIME Spent:   Minutes non procedure based      Comments   >50% of visit spent in counseling and coordination of care     ________________________________________________________________________  Kelsi Murdock MD     Procedures: see electronic medical records for all procedures/Xrays and details which were not copied into this note but were reviewed prior to creation of Plan. LABS:  I reviewed today's most current labs and imaging studies.   Pertinent labs include:  Recent Labs     06/19/19  0346 06/18/19  0146 06/17/19  1524 06/17/19  0543   WBC  --  6.8  --  6.5   HGB 9.2* 8.3* 9.7* 9.5*   HCT 29.2* 25.4* 29.9* 29.7*   PLT  --  316  --  303     Recent Labs     06/19/19  0346 06/18/19  0146 06/17/19  0543    139 139   K 3.7 3.8 3.6    106 106   CO2 27 28 27   GLU 99 149* 112*   BUN 15 12 10   CREA 1.15 1.12 1.05   CA 8.8 8.7 8.8   MG  --  2.1 2.0       Signed: Kelsi Murdock MD

## 2019-06-19 NOTE — PROGRESS NOTES
Bedside report given to [facility]. Kardex, MAR, and current orders reviewed.

## 2019-06-19 NOTE — PROGRESS NOTES
MARSHALL Plan:    A) pt to discharge home w/HH services. B) Follow up PCP appointment @ [facility].            [facility]. SW to fax requested documents to [facility]).         Roque Villa, MSW  [phone]

## 2019-06-19 NOTE — PROGRESS NOTES
15:20 pm Inbound call from [address] [place] [address] to Cecilia powell. Informed [place], patient has a wound vac and possibly will be discharging w/IV-Antibiotics and a PICC Line. Writer informed [facility] representative, pt has elected not to go to a SNF as he desires to go home w/HH. [facility] representative informed writer of the following:    -[facility] won't approve for him to go home w/HH d/t wound vac,. PICC line, and IV-Antibiotic medication. -[facility] will pay for him to go a contract facility. If patient is agreeable fax requested documentation to [facility] Attn: Kulwinder Otero @ [phone] for short stay while on IV-Antibiotics. -'s Preference form, demographics, H&P, labs, physician progressnotes, Infectious dx notes (initial & 3 days worth of notes), microbiology report, therapy notes, orders for wound vac/wound care, and order for IV-Antibiotic. Writer to fax all requested documentation to [address], MSW  [phone]      14:55 pm, Outbound call and voice message left for Cecilia powell @ [phone] [employer]  Contact information left requesting a return phone call.

## 2019-06-19 NOTE — PROGRESS NOTES
Initial Nutrition Assessment:    INTERVENTIONS/RECOMMENDATIONS:   · Meals/Snacks: General/healthful diet  ·     ASSESSMENT:   Chart reviewed, patient medically noted for acute osteomyelitis of right foot and diabetic foot ulcer. PMH type 2 diabetes and DVT. Patient said his appetite has been good and he has no issues eating. Patient has been using the menu, discussed carb consistent diet with patient. Encouraged patient to continue selecting meals and highlighted the importance of protein in wound healing. Patient agreed to order at least one source of protein with each meal. Patient had no additional questions/concerns. Will monitor PO intake and recommend supplements if  needed. Diet Order: Consistent carb  % Eaten:  No data found. Pertinent Medications: [x]Reviewed []Other: Humalog, zosyn   Pertinent Labs: [x]Reviewed []Other  Food Allergies: [x]None []Other   Last BM: 6/16   [x]Active     []Hyperactive  []Hypoactive       [] Absent BS  Skin:    [] Intact   [] Incision  [x] Breakdown  [] Other:    Anthropometrics:   Height: 6' (182.9 cm) Weight: 80.3 kg (177 lb)   IBW (%IBW):   ( ) UBW (%UBW):   (  %)   Last Weight Metrics:  Weight Loss Metrics 6/12/2019 6/11/2019   Today's Wt 177 lb -   BMI - 24.01 kg/m2       BMI: Body mass index is 24.01 kg/m². This BMI is indicative of:   []Underweight    [x]Normal    []Overweight    [] Obesity   [] Extreme Obesity (BMI>40)     Estimated Nutrition Needs (Based on):   2075 Kcals/day(BMR ( 1596) X AF 1.3 ) , 80 g(1g/kg BW ) Protein  Carbohydrate:  At Least 130 g/day  Fluids: 2075 mL/day (1ml/kcal)    NUTRITION DIAGNOSES:   Problem:  Increased nutrient needs      Etiology: related to increased protein needed for healing      Signs/Symptoms: as evidenced by Acute osteomyelitis of right foot and diabetic foot ulcer       NUTRITION INTERVENTIONS:  Meals/Snacks: General/healthful diet   Supplements: Commercial supplement              GOAL:   PO intake >75 % of meals/supplements next 3-5 days     LEARNING NEEDS (Diet, Food/Nutrient-Drug Interaction):    [x] None Identified   [] Identified and Education Provided/Documented   [] Identified and Pt declined/was not appropriate     Cultureal, Worship, OR Ethnic Dietary Needs:    [x] None Identified   [] Identified and Addressed     [x] Interdisciplinary Care Plan Reviewed/Documented    [x] Discharge Planning: Continue consistent  carb diet       MONITORING /EVALUATION:    Food/Nutrient Intake Outcomes:  Total energy intake  Physical Signs/Symptoms Outcomes: Weight/weight change, Electrolyte and renal profile, GI profile, Glucose profile    NUTRITION RISK:    [x] Patient At Nutritional Risk    [] Patient Not At Nutritional Risk    PT SEEN FOR:    []  MD Consult: []Calorie Count      []Diabetic Diet Education        []Diet Education     []Electrolyte Management     []General Nutrition Management and Supplements     []Management of Tube Feeding     []TPN Recommendations    []  RN Referral:  []MST score >=2     []Enteral/Parenteral Nutrition PTA     []Pregnant: Gestational DM or Multigestation     []Pressure Ulcer/Wound Care needs        []  Low BMI  [x]  [facility]      Amador Ricksats  Pager [phone]  Weekend Pager [phone]

## 2019-06-19 NOTE — PROGRESS NOTES
Problem: Patient Education: Go to Patient Education Activity  Goal: Patient/Family Education  Outcome: Progressing Towards Goal     Problem: Falls - Risk of  Goal: *Absence of Falls  Description  Document Pablo Zelaya Fall Risk and appropriate interventions in the flowsheet. Outcome: Progressing Towards Goal     Pt remained free of falls during shift.   Pt is working with PT.

## 2019-06-20 ENCOUNTER — APPOINTMENT (OUTPATIENT)
Dept: GENERAL RADIOLOGY | Age: 69
DRG: 629 | End: 2019-06-20
Attending: INTERNAL MEDICINE
Payer: MEDICARE

## 2019-06-20 LAB
ANION GAP SERPL CALC-SCNC: 7 MMOL/L (ref 5–15)
BUN SERPL-MCNC: 15 MG/DL (ref 6–20)
BUN/CREAT SERPL: 13 (ref 12–20)
CALCIUM SERPL-MCNC: 8.7 MG/DL (ref 8.5–10.1)
CHLORIDE SERPL-SCNC: 106 MMOL/L (ref 97–108)
CO2 SERPL-SCNC: 27 MMOL/L (ref 21–32)
CREAT SERPL-MCNC: 1.15 MG/DL (ref 0.7–1.3)
GLUCOSE BLD STRIP.AUTO-MCNC: 125 MG/DL (ref 65–100)
GLUCOSE BLD STRIP.AUTO-MCNC: 140 MG/DL (ref 65–100)
GLUCOSE BLD STRIP.AUTO-MCNC: 152 MG/DL (ref 65–100)
GLUCOSE BLD STRIP.AUTO-MCNC: 156 MG/DL (ref 65–100)
GLUCOSE SERPL-MCNC: 110 MG/DL (ref 65–100)
POTASSIUM SERPL-SCNC: 3.8 MMOL/L (ref 3.5–5.1)
SERVICE CMNT-IMP: ABNORMAL
SODIUM SERPL-SCNC: 140 MMOL/L (ref 136–145)

## 2019-06-20 PROCEDURE — 74011250637 HC RX REV CODE- 250/637: Performed by: EMERGENCY MEDICINE

## 2019-06-20 PROCEDURE — 74011250636 HC RX REV CODE- 250/636: Performed by: PODIATRIST

## 2019-06-20 PROCEDURE — 65270000015 HC RM PRIVATE ONCOLOGY

## 2019-06-20 PROCEDURE — 77030018786 HC NDL GD F/USND BARD -B

## 2019-06-20 PROCEDURE — 36573 INSJ PICC RS&I 5 YR+: CPT | Performed by: INTERNAL MEDICINE

## 2019-06-20 PROCEDURE — 74011250637 HC RX REV CODE- 250/637: Performed by: PODIATRIST

## 2019-06-20 PROCEDURE — 74011636637 HC RX REV CODE- 636/637: Performed by: INTERNAL MEDICINE

## 2019-06-20 PROCEDURE — 82962 GLUCOSE BLOOD TEST: CPT

## 2019-06-20 PROCEDURE — 76937 US GUIDE VASCULAR ACCESS: CPT

## 2019-06-20 PROCEDURE — 77030019934 HC DRSG VAC ASST KCON -B

## 2019-06-20 PROCEDURE — 74011000258 HC RX REV CODE- 258: Performed by: EMERGENCY MEDICINE

## 2019-06-20 PROCEDURE — 74011250636 HC RX REV CODE- 250/636: Performed by: INTERNAL MEDICINE

## 2019-06-20 PROCEDURE — 74011250636 HC RX REV CODE- 250/636: Performed by: EMERGENCY MEDICINE

## 2019-06-20 PROCEDURE — C1751 CATH, INF, PER/CENT/MIDLINE: HCPCS

## 2019-06-20 PROCEDURE — 74011000258 HC RX REV CODE- 258: Performed by: INTERNAL MEDICINE

## 2019-06-20 PROCEDURE — 71045 X-RAY EXAM CHEST 1 VIEW: CPT

## 2019-06-20 PROCEDURE — 80048 BASIC METABOLIC PNL TOTAL CA: CPT

## 2019-06-20 PROCEDURE — 02HV33Z INSERTION OF INFUSION DEVICE INTO SUPERIOR VENA CAVA, PERCUTANEOUS APPROACH: ICD-10-PCS | Performed by: INTERNAL MEDICINE

## 2019-06-20 PROCEDURE — 77030019952 HC CANSTR VAC ASST KCON -B

## 2019-06-20 PROCEDURE — 94760 N-INVAS EAR/PLS OXIMETRY 1: CPT

## 2019-06-20 PROCEDURE — 36415 COLL VENOUS BLD VENIPUNCTURE: CPT

## 2019-06-20 RX ORDER — HEPARIN 100 UNIT/ML
300 SYRINGE INTRAVENOUS AS NEEDED
Status: DISCONTINUED | OUTPATIENT
Start: 2019-06-20 | End: 2019-06-21 | Stop reason: HOSPADM

## 2019-06-20 RX ORDER — BACITRACIN 500 UNIT/G
1 PACKET (EA) TOPICAL AS NEEDED
Status: DISCONTINUED | OUTPATIENT
Start: 2019-06-20 | End: 2019-06-21 | Stop reason: HOSPADM

## 2019-06-20 RX ADMIN — Medication 10 ML: at 12:35

## 2019-06-20 RX ADMIN — INSULIN LISPRO 2 UNITS: 100 INJECTION, SOLUTION INTRAVENOUS; SUBCUTANEOUS at 12:23

## 2019-06-20 RX ADMIN — PIPERACILLIN SODIUM,TAZOBACTAM SODIUM 3.38 G: 3; .375 INJECTION, POWDER, FOR SOLUTION INTRAVENOUS at 12:33

## 2019-06-20 RX ADMIN — OXYCODONE HYDROCHLORIDE AND ACETAMINOPHEN 1 TABLET: 7.5; 325 TABLET ORAL at 12:32

## 2019-06-20 RX ADMIN — Medication 1 CAPSULE: at 08:52

## 2019-06-20 RX ADMIN — ASPIRIN 81 MG: 81 TABLET, COATED ORAL at 08:52

## 2019-06-20 RX ADMIN — INSULIN LISPRO 2 UNITS: 100 INJECTION, SOLUTION INTRAVENOUS; SUBCUTANEOUS at 17:26

## 2019-06-20 RX ADMIN — MORPHINE SULFATE 2 MG: 2 INJECTION, SOLUTION INTRAMUSCULAR; INTRAVENOUS at 14:53

## 2019-06-20 RX ADMIN — PIPERACILLIN SODIUM,TAZOBACTAM SODIUM 3.38 G: 3; .375 INJECTION, POWDER, FOR SOLUTION INTRAVENOUS at 20:13

## 2019-06-20 RX ADMIN — PIPERACILLIN SODIUM,TAZOBACTAM SODIUM 3.38 G: 3; .375 INJECTION, POWDER, FOR SOLUTION INTRAVENOUS at 05:52

## 2019-06-20 RX ADMIN — Medication 10 ML: at 21:31

## 2019-06-20 RX ADMIN — ENOXAPARIN SODIUM 40 MG: 40 INJECTION SUBCUTANEOUS at 20:10

## 2019-06-20 NOTE — INTERDISCIPLINARY ROUNDS
Oncology Interdisciplinary rounds were held today to discuss patient plan of care and outcomes. The following members were present: Nursing, Physician, Case Management, Pharmacy, and PT/OT Actual Length of Stay: 9 DRG GLOS: 6 Expected Length of Stay: 6d 0h 
 
 
 
               Plan            Discharge Wound vac. Wound care following. IV ABX Patient wants to go home. Patient wants home health.

## 2019-06-20 NOTE — PROGRESS NOTES
Podiatry    Subjective: Pt resting comfortably. Patient is a 68 y.o.  male who is being seen for osteomyelitis right heel. Patient Active Problem List    Diagnosis Date Noted    Di[facility] ulcer ([facility] 2019    [facility]    Controlled type 2 diabetes mellitus with skin complication, without long-term current use of insulin ([facility] 2019     Past Medical History:   Diagnosis Date    [facility]     DVT (deep venous thrombosis) ([facility] 2019    Right leg stent placed    [facility]emb[facility])      Past Surgical History:   Procedure Laterality Date    VASCULAR SURGERY PROCEDURE UNLIST  2019    stents placed RLE (2)       History reviewed. No pertinent family history. Social History     Tobacco Use    Smoking status: Never Smoker    Smokeless tobacco: Never Used   Substance Use Topics    Alcohol use: Yes     Alcohol/week: 3.6 oz     Types: 6 Cans of beer per week     Comment: occ     No Known Allergies  Prior to Admission medications    Medication Sig Start Date End Date Taking? Authorizing Provider   metFORMIN (GLUCOPHAGE) 1,000 mg tablet Take 1,000 mg by mouth daily. Other, MD Simón       Review of Systems AO x3. NAD. Objective:     Patient Vitals for the past 8 hrs:   BP Temp Pulse Resp SpO2   19 1500 140/74 99.1 °F (37.3 °C) 64 12 98 %     Temp (24hrs), Av.3 °F (37.4 °C), Min:98.4 °F (36.9 °C), Max:100.1 °F (37.8 °C)      Physical Exam Lower Extremity  Wound VAC dressing in place, right heel still has erythema and edema, but there is improvement of the remainder of the foot and leg.   DP and PT palpable B  Sensation intact to light touch    Data Review:   Recent Results (from the past 24 hour(s))   GLUCOSE, POC    Collection Time: 19  8:44 PM   Result Value Ref Range    Glucose (POC) 156 (H) 65 - 100 mg/dL    Performed by Mukund Patterson BASIC    Collection Time: 19  4:07 AM Result Value Ref Range    Sodium 140 136 - 145 mmol/L    Potassium 3.8 3.5 - 5.1 mmol/L    Chloride 106 97 - 108 mmol/L    CO2 27 21 - 32 mmol/L    Anion gap 7 5 - 15 mmol/L    Glucose 110 (H) 65 - 100 mg/dL    BUN 15 6 - 20 MG/DL    Creatinine 1.15 0.70 - 1.30 MG/DL    BUN/Creatinine ratio 13 12 - 20      GFR est AA >60 >60 ml/min/1.73m2    GFR est non-AA >60 >60 ml/min/1.73m2    Calcium 8.7 8.5 - 10.1 MG/DL   GLUCOSE, POC    Collection Time: 06/20/19  7:56 AM   Result Value Ref Range    Glucose (POC) 125 (H) 65 - 100 mg/dL    Performed by [email or web address] (PCT)    GLUCOSE, POC    Collection Time: 06/20/19 11:38 AM   Result Value Ref Range    Glucose (POC) 152 (H) 65 - 100 mg/dL    Performed by [employer] (PCT)    GLUCOSE, POC    Collection Time: 06/20/19  5:03 PM   Result Value Ref Range    Glucose (POC) 140 (H) 65 - 100 mg/dL    Performed by Nirav Qualia (PCT)          Impression:   1. Cellulitis right foot, improving  2. Osteomyelitis right calcaneus, S/P debridement  3. Ulcer to bone right heel      Recommendation:   Case discussed with Dr. Jodi Urias earlier today; we agreed that 6 weeks of IV antibiotics is the best option due to the remaining risk of BKA if the current Tx is unsuccessful. Still need to get a CAM boot to the patient, but otherwise the Tx plan is in place: IV antibiotics x6 weeks, Wound VAC until granulation tissue covers all bone, protected weight-bearing right foot.

## 2019-06-20 NOTE — PROGRESS NOTES
0730 - Bedside and Verbal shift change report given to Cherry Conklin RN (oncoming nurse) by [address] [facility] (offgoing nurse). Report included the following information SBAR and Kardex. Wound vac to continuous suction, canister at 325ml sanguinous drainage from right foot. 1530 - Bedside and Verbal shift change report given to Doctor Court Hutchison (oncoming nurse) by Cherry Conklin RN (offgoing nurse). Report included the following information SBAR and Kardex.

## 2019-06-20 NOTE — PROGRESS NOTES
Admissions from [facility] called this CM to ask about IV medications and wound vac. Medical director will need review medication and discharge plans and will provide information regarding acceptance with possible admission if medically stable on 6/21/2019. Bebe Reddell  Tacy Busy, [address] Educator  [place] [place] Facilitator  [phone]

## 2019-06-20 NOTE — PROGRESS NOTES
Infectious Disease Progress Note        Mr Bruce Hoang seen  Tolerating IV antibiotics, denied GI upset, diarrhea , fever, chills, rash  No new symptoms today       Current Facility-Administered Medications:     insulin lispro (HUMALOG) injection, , SubCUTAneous, AC&HS, Omayra Zaragoza MD, Stopped at 06/19/19 1630    enoxaparin (LOVENOX) injection 40 mg, 40 mg, SubCUTAneous, Q24H, Valdo Pinzon DPSHAWN, 40 mg at 06/19/19 2011    lactobac ac& pc-s.therm-b.anim (JENNIFER Q/RISAQUAD), 1 Cap, Oral, DAILY, Laurine Ahumada, MD, 1 Cap at 06/20/19 0852    oxyCODONE-acetaminophen (PERCOCET 7.5) 7.5-325 mg per tablet 1 Tab, 1 Tab, Oral, Q6H PRN, Valdo Pinzon DPM, 1 Tab at 06/19/19 2011    morphine injection 2 mg, 2 mg, IntraVENous, Q3H PRN, Valdo Pinzon DPM, 2 mg at 06/17/19 2122    ondansetron (ZOFRAN) injection 4 mg, 4 mg, IntraVENous, Q4H PRN, Valdo Pinzon DPSHAWN, 4 mg at 06/19/19 2137    piperacillin-tazobactam (ZOSYN) 3.375 g in 0.9% sodium chloride (MBP/ADV) 100 mL, 3.375 g, IntraVENous, Q8H, Rosetta Sosa, , Last Rate: 25 mL/hr at 06/20/19 0552, 3.375 g at 06/20/19 0552    sodium chloride (NS) flush 5-40 mL, 5-40 mL, IntraVENous, Q8H, Indra Pinzon DPM, 10 mL at 06/19/19 1400    sodium chloride (NS) flush 5-40 mL, 5-40 mL, IntraVENous, PRN, Valdo Pinzon DPSHAWN    ondansetron (ZOFRAN) injection 4 mg, 4 mg, IntraVENous, Q4H PRN, Valdo Pinzon DPM    glucose chewable tablet 16 g, 4 Tab, Oral, PRN, Valdo Pinzon DPM    glucagon (GLUCAGEN) injection 1 mg, 1 mg, IntraMUSCular, PRN, Valdo Pinzon DPM    aspirin delayed-release tablet 81 mg, 81 mg, Oral, DAILY, Valdo Pinzon DPM, 81 mg at 06/20/19 8641        Physical Exam:    Vitals:   Patient Vitals for the past 24 hrs:   Temp Pulse Resp BP SpO2   06/20/19 0700 98.4 °F (36.9 °C) 63 12 135/68 97 %   06/19/19 2253 99.7 °F (37.6 °C) 70 16 153/82 94 %   06/19/19 2137 99.1 °F (37.3 °C)       06/19/19 1958 100.1 °F (37.8 °C) 79 18 159/81    06/19/19 1549 98.8 °F (37.1 °C) 66 16 152/79 100 %     · GEN: NAD  · HEENT:no scleral icterus,  no thrush  · CV: S1, S2 heard regularly  · Lungs: Clear to auscultation bilaterally  · Abdomen: soft, non distended, non tender,   · Extremities: no edema  · Neuro: Alert, oriented to self, place and situation, moves all extremities to commands, verbal   · Skin: no rash  MSK: + R foot with wound vac, no erythema seen, no increased warmth felt       Labs:   Recent Results (from the past 24 hour(s))   GLUCOSE, POC    Collection Time: 06/19/19 11:41 AM   Result Value Ref Range    Glucose (POC) 146 (H) 65 - 100 mg/dL    Performed by Mc 64, POC    Collection Time: 06/19/19  3:57 PM   Result Value Ref Range    Glucose (POC) 115 (H) 65 - 100 mg/dL    Performed by King Altagracia    GLUCOSE, POC    Collection Time: 06/19/19  8:44 PM   Result Value Ref Range    Glucose (POC) 156 (H) 65 - 100 mg/dL    Performed by Giorgi Grow    METABOLIC PANEL, BASIC    Collection Time: 06/20/19  4:07 AM   Result Value Ref Range    Sodium 140 136 - 145 mmol/L    Potassium 3.8 3.5 - 5.1 mmol/L    Chloride 106 97 - 108 mmol/L    CO2 27 21 - 32 mmol/L    Anion gap 7 5 - 15 mmol/L    Glucose 110 (H) 65 - 100 mg/dL    BUN 15 6 - 20 MG/DL    Creatinine 1.15 0.70 - 1.30 MG/DL    BUN/Creatinine ratio 13 12 - 20      GFR est AA >60 >60 ml/min/1.73m2    GFR est non-AA >60 >60 ml/min/1.73m2    Calcium 8.7 8.5 - 10.1 MG/DL   GLUCOSE, POC    Collection Time: 06/20/19  7:56 AM   Result Value Ref Range    Glucose (POC) 125 (H) 65 - 100 mg/dL    Performed by Aaron Goddard (PCT)        Microbiology Data:       Blood:6/11/19         Component Value Ref Range & Units Status   Special Requests: NO SPECIAL REQUESTS    Final   Culture result: NO GROWTH 5 DAYS    Final   Result History         Bone 6/12/19  Bone         Component Value Ref Range & Units Status   Special Requests: NO SPECIAL REQUESTS    Final   GRAM STAIN OCCASIONAL WBCS SEEN    Final GRAM STAIN    Final   RARE APPARENT INTRACELLULAR GRAM POSITIVE COCCI IN PAIRS    Culture result: SCANT ESCHERICHIA COLIAbnormal     Final   Culture result: Abnormal     Final   SCANT STREPTOCOCCI, BETA HEMOLYTIC GROUP B Penicillin and ampicillin are drugs of choice for treatment of beta-hemolytic streptococcal infections. Susceptibility testing of penicillins and beta-lactams approved by the FDA for treatment of beta-hemolytic streptococcal infections need not be performed routinely, because nonsusceptible isolates are extremely rare. CLSI 2012   Culture result:   Final   (PLEASE REFER TO [facility] [id number] FOR ANAEROBIC GROWTH)    Susceptibility      Escherichia coli     YASMINE    Amikacin ($) <=16 ug/mL S    Ampicillin ($) >16 ug/mL R    Ampicillin/sulbactam ($) >16/8 ug/mL R    Aztreonam ($$$$) <=4 ug/mL S    Cefazolin ($) <=8 ug/mL S    Cefepime ($$) <=4 ug/mL S    Cefotaxime <=2 ug/mL S    Ceftazidime ($) <=1 ug/mL S    Ceftriaxone ($) <=1 ug/mL S    Cefuroxime ($) <=4 ug/mL S    Ciprofloxacin ($) <=1 ug/mL S    Gentamicin ($) >8 ug/mL R    Imipenem <=1 ug/mL S    Levofloxacin ($) <=2 ug/mL S    Meropenem ($$) <=1 ug/mL S    Piperacillin/Tazobac ($) <=16 ug/mL S    Tobramycin ($) >8 ug/mL R    Trimeth/Sulfa >2/38 ug/mL R          Result History       Component Value Ref Range & Units Status   Special Requests: HEEL   Final   Culture result: Abnormal     Final   SCANT STREPTOCOCCI, BETA HEMOLYTIC GROUP B Penicillin and ampicillin are drugs of choice for treatment of beta-hemolytic streptococcal infections. Susceptibility testing of penicillins and beta-lactams approved by the FDA for treatment of beta-hemolytic streptococcal infections need not be performed routinely, because nonsusceptible isolates are extremely rare.  CLSI 2012   Culture result: Abnormal     Final   LIGHT MIXED ANAEROBIC GRAM NEGATIVE RODS BETA LACTAMASE NEGATIVE                Component Value Ref Range & Units Status   Special Requests: HEEL Preliminary   Culture result: NO FUNGUS ISOLATED 5 DAYS    Preliminary   Result History       Wound foot  6/8/19          Component Value Ref Range & Units Status   Special Requests: NO SPECIAL REQUESTS    Final   GRAM STAIN OCCASIONAL WBCS SEEN    Final   GRAM STAIN 3+ GRAM NEGATIVE RODS    Final   Culture result: Abnormal     Final   MODERATE ENTEROCOCCUS FAECALIS GROUP D    Culture result: Abnormal     Final   LIGHT STREPTOCOCCI, BETA HEMOLYTIC GROUP B . Penicillin and ampicillin are drugs of choice for treatment of beta-hemolytic streptococcal infections. Susceptibility testing of penicillins and beta-lactams approved by the FDA for treatment of beta-hemolytic streptococcal infections need not be performed routinely, because nonsusceptible isolates are extremely rare. CLSI 2012   Culture result: FEW PSEUDOMONAS AERUGINOSAAbnormal     Final   Susceptibility      Enterococcus  faecalis group D Pseudomonas aeruginosa     YASMINE YASMINE    Amikacin ($)   <=16 ug/mL S    Ampicillin ($) <=2 ug/mL S      Aztreonam ($$$$)   <=4 ug/mL S    Cefepime ($$)   <=4 ug/mL S    Ceftazidime ($)   <=1 ug/mL S    Ciprofloxacin ($)   <=1 ug/mL S    Daptomycin ($$$$$) 1 ug/mL S      Gentamicin ($)   <=4 ug/mL S    Imipenem   <=1 ug/mL S    Levofloxacin ($)   <=2 ug/mL S    Linezolid ($$$$$) 2 ug/mL S      Meropenem ($$)   <=1 ug/mL S    Penicillin G ($$) 2 ug/mL S      Piperacillin/Tazobac ($)   <=16 ug/mL S    Tobramycin ($)   <=4 ug/mL S    Vancomycin ($) 2 ug/mL S              Result History         Imaging:     MRI R foot   FINDINGS: Bone marrow:  There is a small erosion of the posterior lateral  inferior calcaneus with underlying enhancement and edema and decreased T1 signal  consistent with acute osteomyelitis.     Joint fluid:  None.     Tendons: Intact.     Muscles: Mild edema in the musculature likely related to diabetic neuropathy.     Neurovascular bundles: Within normal limits.     Articular cartilage:Intact without focal osteochondral lesion.     Soft tissue mass: There is a soft tissue ulcer of the posterior lateral healed  extending to near to the calcaneus. There is nonspecific subcutaneous edema  surrounding the foot and ankle.     IMPRESSION  IMPRESSION:   Ulceration of the posterior lateral heel with acute osteomyelitis in the  posterior lateral inferior calcaneus.       DASIA 6/11/19  Interpretation Summary     · The right resting DASIA is normal.  · The left resting DASIA is mildly decreased. Arterial disease noted at the level of the femoral artery, popliteal and tibial artery on the left side. · Significant disease in the left pedial arch and digits. · The left TBI is decreased to 0.22. · The right TBI is 0.52. X ray R foot 6/8/19     FINDINGS: Three views of the right foot. There is ulceration of the plantar  aspect of the heel. Moderate degenerative changes are seen in the tibiotalar  joint. There is a pes planus deformity with mild degenerative changes in the  midfoot. There is a type II accessory navicular bone. There is a hallux valgus  deformity. No acute fracture or dislocation. No evidence of osteomyelitis. There  is mild soft tissue swelling of the dorsum of the foot.     IMPRESSION  IMPRESSION:   1. Ulceration of the plantar aspect of the heel without evidence of  osteomyelitis. 2. Mild soft tissue swelling of the dorsum of the foot. 3. Degenerative changes. Pathology:     FINAL PATHOLOGIC DIAGNOSIS   Bone, submitted as \"bone margin, right calcaneus osteomyelitis\", biopsy:   Negative for acute osteomyelitis. Assessment / Plan:       Mr Dami Chiu is a 68year old gentleman with hx of DM, long standing wound on R heel area admitted with necrotic wound and osteomyelitis.      1) R foot osteomyelitis:  S/P debridement bone /wound R calcaneous on 6/12/19 and again 6/17/19  Cultures of bone with  Strep, Ecoli, ( mixed anaerobic GNR beta lactamase negative labelled as wound )  Cultures wound with Pseudomonas and E faecalis   Continue Zosyn IV renally dosed   Pathology resulted as bone margin, negative for osteomyelitis  I spoke with Dr Robbie Butler today and per discussion, the sent specimen may not be entirely representative of bone margins  Based on above and that further debridement of bone is not much possible without BKA amputation per Dr Robbie Butler, we will plan on treating for possible residual osteomyelitis  D/W with patient in detail today  D/W about antibiotic side effects, toxicities etc including GI, renal, hematological, ability to lower seizure threshold, risk for CDI again today with patient     Recommedations:  IV Zosyn renally dosed per pharmacy   Check weekly CBC wt diff, CMP, ESR CRP  Ok for PICC   Plan for 6 weeks antibiotics till 7/29/19   F/U with me on 7/9/19 at 11 00 am   DC PICC once antibiotics completed   Probiotics/yogurt encouraged   Offloading pressure per Podiatry   Pain control per primary team       2) DM  A1C 7.9     3) Screening: HIV and Hep C negative     4) DVT px        I will sign off at this time. Thank for the opportunity to participate in the care of this patient. Please contact with questions or concerns.        Rosetta Sosa DO  10:29 AM

## 2019-06-20 NOTE — WOUND CARE
Wound Care Consult: Chart reviewed and patient assessed for the right lateral heel wound post op day # 3. Dr. Santa Khan will see patient later this evening. The wound overall is healing well with healthy granulation tissue noted in about 75% of the wound. There are antibiotic beads in the deepest part of the wound bed so it is difficult to have an exact depth. A few of the beads came out today but most of them were preserved. They are acting as packing for the deepest part of the wound. Pt. Was premedicated for pain of wound care. He now has a PICC line as well. See photo below:  
 
6-20-19 - right lateral heel wound Wound Heel Right One site with adaptic and wound vac  (Active) Dressing Status Removed 6/20/2019  3:20 PM  
Dressing Type Vacuum dressing 6/20/2019  3:20 PM  
Incision Site Well Approximated No 6/17/2019 10:39 AM  
Non-staged Wound Description Full thickness 6/20/2019  3:20 PM  
Pressure Injury Stage 4 6/20/2019  3:20 PM  
Shape oval 6/20/2019  3:20 PM  
Wound Length (cm) 6 cm 6/20/2019  3:20 PM  
Wound Width (cm) 8 cm 6/20/2019  3:20 PM  
Condition of Base Granulation 6/20/2019  3:20 PM  
Condition of Edges Rolled/curled 6/20/2019  3:20 PM  
Epithelialization (%) 0 6/20/2019  3:20 PM  
Assessment Clean;Pink;Red 6/20/2019  3:20 PM  
Drainage Amount Moderate 6/18/2019  4:54 PM  
Drainage Color Sanguinous 6/18/2019  4:54 PM  
Wound Odor None 6/20/2019  3:20 PM  
Kirstie-wound Assessment Maceration;Denuded 6/20/2019  3:20 PM  
Cleansing and Cleansing Agents  Sodium hypochlorite 25 % 6/20/2019  3:20 PM  
Dressing Changed Changed/New 6/20/2019  3:20 PM  
Dressing Type Applied Vacuum dressing 6/20/2019  3:20 PM  
Procedure Tolerated Well 6/20/2019  3:20 PM  
 
Treatment today: Cleansed with Dakin Solution and wiped with gauze. The periwound skin was prepped with Marathon skin protectant as well as the vac drape.   Black Granufoam was packed into the wound and bridged about 4 inches from the wound border so that PT can work with him on mobility. The Vac dressing was suctioned down to 125 mmHg without problems. The canister was changed today to reflect the current drainage from the wound. Plan: Next Dressing change is planned for Monday.   
Elena Meza RN, BSN, [employer]

## 2019-06-20 NOTE — PROGRESS NOTES
Hospitalist Progress Note    NAME: Maritza Velasquez   :  1950   MRN:  [id number]       Assessment / Plan:    Acute Osteomyelitis of R Foot POA  R foot Cellulitis POA  R Diabetic Foot Ulcer- non healing POA  PAD s/p R leg stent at [place] per pt in 2019   Recent Wound Cx - growing Pseudomonas & enterococcal sp  MRI R foot noted- Acute OM of the calcaneus   Cultures of bone with  Strep, Ecoli, ( mixed anaerobic GNR beta lactamase negative labelled as wound ). Cultures wound with Pseudomonas and E faecalis     IP Podiatry consulted-s/p I&D  and  Path :  Bone, submitted as \"bone margin, right calcaneus osteomyelitis\", biopsy:  Negative for acute osteomyelitis     Cont home ASA daily  -continue wound vac per podiatry  -ID recs appreciated:   IV Zosyn renally dosed per pharmacy   Check weekly CBC wt diff, CMP, ESR CRP  Plan for 6 weeks antibiotics till 19   F/U with Dr Dustin Villegas on 19 at 6 00 am   DC PICC once antibiotics completed       Newly diagnosed DM type 2 POA- in 2019  FS Q AC & HS when eating, hold metformin, follow with ssi, a1c 7.9    CKD 2-3  Stable, follow    Elevated BP  -BP has been a bit high, not on meds at home  -likely stress related. Monitor    Anemia  -chronic + blood loss from surgery. Follow - Hg stable           Code Status: Full  Surrogate Decision Maker: friend Natalie Ames # [phone]     DVT Prophylaxis: Sq heparin  GI Prophylaxis: not indicated  Baseline: Pt lives with a friend, ambulatory    18.5 - 24.9 Normal weight / Body mass index is 24.01 kg/m². Recommended Disposition: TBD ? rehab for IV abx, wound vac, PT     Subjective:     Chief Complaint / Reason for Physician Visit  Follow up Right foot OM, DM, HTN, anemia    Review of Systems:  Symptom Y/N Comments  Symptom Y/N Comments   Fever/Chills    Chest Pain n    Poor Appetite n   Edema     Cough n   Abdominal Pain n    Sputum    Joint Pain     SOB/BLACK n   Pruritis/Rash     Nausea/vomit n Tolerating PT/OT     Diarrhea    Tolerating Diet y    Constipation    Other       Could NOT obtain due to:      Objective:     VITALS:   Last 24hrs VS reviewed since prior progress note. Most recent are:  Patient Vitals for the past 24 hrs:   Temp Pulse Resp BP SpO2   06/20/19 0700 98.4 °F (36.9 °C) 63 12 135/68 97 %   06/19/19 2253 99.7 °F (37.6 °C) 70 16 153/82 94 %   06/19/19 2137 99.1 °F (37.3 °C)       06/19/19 1958 100.1 °F (37.8 °C) 79 18 159/81    06/19/19 1549 98.8 °F (37.1 °C) 66 16 152/79 100 %       Intake/Output Summary (Last 24 hours) at 6/20/2019 1214  Last data filed at 6/20/2019 0801  Gross per 24 hour   Intake 150 ml   Output 1850 ml   Net -1700 ml        PHYSICAL EXAM:  PHYSICAL EXAM:  General: WD, WN. Alert, cooperative, no acute distress    EENT:  EOMI. Anicteric sclerae. MMM  Resp:  CTA bilaterally, no wheezing or rales. No accessory muscle use  CV:  Regular  rhythm,  No edema  GI:  Soft, Non distended, Non tender. +Bowel sounds  Neurologic:  Alert and oriented X 3, normal speech,   Psych:   Good insight. Not anxious nor agitated  Skin:  No rashes. No jaundice (+) right foot wound vac dressing        Reviewed most current lab test results and cultures  YES  Reviewed most current radiology test results   YES  Review and summation of old records today    NO  Reviewed patient's current orders and MAR    YES  PMH/ reviewed - no change compared to H&P  ________________________________________________________________________  Care Plan discussed with:    Comments   Patient y    Family      RN y    Care Manager     Consultant                        Multidiciplinary team rounds were held today with , nursing, pharmacist and clinical coordinator. Patient's plan of care was discussed; medications were reviewed and discharge planning was addressed.      ________________________________________________________________________  Total NON critical care TIME:  25   Minutes    Total CRITICAL CARE TIME Spent:   Minutes non procedure based      Comments   >50% of visit spent in counseling and coordination of care     ________________________________________________________________________  Jacques Monroe MD     Procedures: see electronic medical records for all procedures/Xrays and details which were not copied into this note but were reviewed prior to creation of Plan. LABS:  I reviewed today's most current labs and imaging studies.   Pertinent labs include:  Recent Labs     06/19/19  0346 06/18/19  0146 06/17/19  1524   WBC  --  6.8  --    HGB 9.2* 8.3* 9.7*   HCT 29.2* 25.4* 29.9*   PLT  --  316  --      Recent Labs     06/20/19  0407 06/19/19  0346 06/18/19  0146    140 139   K 3.8 3.7 3.8    106 106   CO2 27 27 28   * 99 149*   BUN 15 15 12   CREA 1.15 1.15 1.12   CA 8.7 8.8 8.7   MG  --   --  2.1       Signed: Jacques Monroe MD

## 2019-06-20 NOTE — PROGRESS NOTES
MARSHALL Plan;    A) [facility]F [facility]. 12:05 pm, Outbound call to [facility], [employer] and [employer] [facility]. Spoke w/Laurita. Writer informed Gomez Gonsalez of referral sent on behalf of patient. Gomez Gonsalez voiced patient's referral/supporting documents will be reviewed today for acceptance. Writer instructed to contact tomorrow re: status. Marsahll Serrato, MSW  [phone]    11:00 Faxed supporting documents to [facility], [employer] and [employer]. Will call to verify receipt of fax. Marshall Avelarde, MSW  [phone]        10:00 am, Outbound call to [facility] to [facility]). Writer informed which [place] contract facilities' to send supporting documentation to on behalf of patient. [facility] Attn: Angelika Mcmullen. Fax [phone]. [place] Attn: Janey Reagan. Fax [phone] Attn: Emy Ibarra. Fax [phone].       0900 am Met w/patient and friend/neighbor. Discussed unsafe tentative discharge plan of patient going home w/HH and living alone. Discussed the importance of him being safe and getting the care he actually needs re: wound vac/IV-Antibiotics. [address], and [place] rep spoke directly w/patient. Patient agreed to go to [place] contract facility. [facility] and [facility]. Form signed and in patient's chart. Marshall Rojelio, MSW  [phone]    5477 am, Outbound call to [facility] to [facility]). Requested documents have been recv'd for review. Marshall Serrato, MSW  [phone]      0805 am, Requested documents faxed to [address] [place] [address] to follow up and verify receipt of fax.     Marshall Serrato, MSW  [phone]

## 2019-06-20 NOTE — PROGRESS NOTES
Problem: Falls - Risk of  Goal: *Absence of Falls  Description  Document Charly Cerna Fall Risk and appropriate interventions in the flowsheet.   Outcome: Progressing Towards Goal

## 2019-06-20 NOTE — PROGRESS NOTES
PICC Education: Explained reason and rationale for PICC placement along with providing education in order to make an informed consent including nature, risks, benefits, potential complications, care and maintenance of PICC line. The opportunity for questions or concerns was given. A 'Patient PICC Handbook was also provided. Patient Mary Mane gave consent for PICC procedure to be done at the bedside. Patient  verbalizes understanding at this time. Questions answered regarding care and activity. Procedure:  Time out completed. Pre procedure assessment done. Maximum sterile barrier precautions observed throughout procedure. Lidocaine 1% 5 ml sc given prior to cannulation  Cannulated Basilic vein using ultrasound guidance and modified seldinger technique. Inserted Single lumen 4 fr PICC in  Right arm using, [employer] Tip Location System and 3CG   Patient has sinus rhythm. Tip Positioning System indicating tall P wave and no negative deflection before P wave which would indicate the PICC tip is properly placed in the distal SVC or the CAJ. PICC tip was confirmed by 2 PICC nurses and 3CG printout was placed on patients chart. Blood return verified and flushed with 20ml NS in each port. Sterile dressing applied with Biopatch, Stat loc, occlusive dressing per protocol. Curios caps applied to each port. Reason for access :  Long Term antibiotics ending 7/29/19. Complications related to insertion;  none. Patient tolerated procedure well with minimal blood loss. PICC procedure performed by: Heidy Dotson RN, BSN  Assisted by: Melly De León RN, [facility]  Right  arm circumference: 30 cm. Catheter internal length:  43 cm  Catheter external length: 0 cm mayte  PICC catheter occupies 13% of vein  Brand of catheter [employer] SOLO POWER PICC,  REF: # [id number]   LOT: # [id number]    EXP: 2020-07-31. Primary nurse Rj Dennis RN, notified PICC line may be used and to hang new infusion tubing prior to use. Analisa Benitez RN, BSN, [facility]    Vascular Access Nurse

## 2019-06-20 NOTE — WOUND CARE
Wound Care Team to change the wound vac at approximately 1:00 today. Discussed with nursing regarding the need for anticipatory pain meds.   
Amy Nath RN, BSN, [employer]

## 2019-06-21 VITALS
HEIGHT: 72 IN | DIASTOLIC BLOOD PRESSURE: 75 MMHG | SYSTOLIC BLOOD PRESSURE: 152 MMHG | RESPIRATION RATE: 16 BRPM | BODY MASS INDEX: 23.98 KG/M2 | HEART RATE: 68 BPM | OXYGEN SATURATION: 99 % | WEIGHT: 177 LBS | TEMPERATURE: 98.4 F

## 2019-06-21 LAB
ANION GAP SERPL CALC-SCNC: 5 MMOL/L (ref 5–15)
BUN SERPL-MCNC: 13 MG/DL (ref 6–20)
BUN/CREAT SERPL: 12 (ref 12–20)
CALCIUM SERPL-MCNC: 8.6 MG/DL (ref 8.5–10.1)
CHLORIDE SERPL-SCNC: 105 MMOL/L (ref 97–108)
CO2 SERPL-SCNC: 29 MMOL/L (ref 21–32)
CREAT SERPL-MCNC: 1.13 MG/DL (ref 0.7–1.3)
GLUCOSE BLD STRIP.AUTO-MCNC: 124 MG/DL (ref 65–100)
GLUCOSE BLD STRIP.AUTO-MCNC: 152 MG/DL (ref 65–100)
GLUCOSE SERPL-MCNC: 112 MG/DL (ref 65–100)
POTASSIUM SERPL-SCNC: 3.9 MMOL/L (ref 3.5–5.1)
SERVICE CMNT-IMP: ABNORMAL
SERVICE CMNT-IMP: ABNORMAL
SODIUM SERPL-SCNC: 139 MMOL/L (ref 136–145)

## 2019-06-21 PROCEDURE — 74011250637 HC RX REV CODE- 250/637: Performed by: PODIATRIST

## 2019-06-21 PROCEDURE — 77030018836 HC SOL IRR NACL ICUM -A

## 2019-06-21 PROCEDURE — 74011250637 HC RX REV CODE- 250/637: Performed by: INTERNAL MEDICINE

## 2019-06-21 PROCEDURE — 74011636637 HC RX REV CODE- 636/637: Performed by: INTERNAL MEDICINE

## 2019-06-21 PROCEDURE — 74011250636 HC RX REV CODE- 250/636: Performed by: INTERNAL MEDICINE

## 2019-06-21 PROCEDURE — 74011000258 HC RX REV CODE- 258: Performed by: INTERNAL MEDICINE

## 2019-06-21 PROCEDURE — 74011250637 HC RX REV CODE- 250/637: Performed by: EMERGENCY MEDICINE

## 2019-06-21 PROCEDURE — 82962 GLUCOSE BLOOD TEST: CPT

## 2019-06-21 PROCEDURE — 94760 N-INVAS EAR/PLS OXIMETRY 1: CPT

## 2019-06-21 PROCEDURE — 80048 BASIC METABOLIC PNL TOTAL CA: CPT

## 2019-06-21 PROCEDURE — 36415 COLL VENOUS BLD VENIPUNCTURE: CPT

## 2019-06-21 RX ORDER — INSULIN LISPRO 100 [IU]/ML
INJECTION, SOLUTION INTRAVENOUS; SUBCUTANEOUS
Qty: 1 VIAL | Refills: 0 | Status: SHIPPED
Start: 2019-06-21

## 2019-06-21 RX ORDER — OXYCODONE AND ACETAMINOPHEN 7.5; 325 MG/1; MG/1
1 TABLET ORAL
Qty: 20 TAB | Refills: 0 | Status: SHIPPED | OUTPATIENT
Start: 2019-06-21 | End: 2019-06-24

## 2019-06-21 RX ORDER — ENOXAPARIN SODIUM 100 MG/ML
40 INJECTION SUBCUTANEOUS EVERY 24 HOURS
Qty: 30 SYRINGE | Refills: 0 | Status: SHIPPED
Start: 2019-06-21

## 2019-06-21 RX ORDER — BACITRACIN 500 UNIT/G
1 PACKET (EA) TOPICAL AS NEEDED
Status: SHIPPED | COMMUNITY
Start: 2019-06-21

## 2019-06-21 RX ORDER — ASPIRIN 81 MG/1
81 TABLET ORAL DAILY
Status: SHIPPED | COMMUNITY
Start: 2019-06-22

## 2019-06-21 RX ORDER — METFORMIN HYDROCHLORIDE 500 MG/1
1000 TABLET ORAL DAILY
Status: DISCONTINUED | OUTPATIENT
Start: 2019-06-21 | End: 2019-06-21 | Stop reason: HOSPADM

## 2019-06-21 RX ADMIN — PIPERACILLIN SODIUM,TAZOBACTAM SODIUM 3.38 G: 3; .375 INJECTION, POWDER, FOR SOLUTION INTRAVENOUS at 13:08

## 2019-06-21 RX ADMIN — Medication 1 CAPSULE: at 09:33

## 2019-06-21 RX ADMIN — ASPIRIN 81 MG: 81 TABLET, COATED ORAL at 09:33

## 2019-06-21 RX ADMIN — METFORMIN HYDROCHLORIDE 1000 MG: 500 TABLET ORAL at 11:30

## 2019-06-21 RX ADMIN — PIPERACILLIN SODIUM,TAZOBACTAM SODIUM 3.38 G: 3; .375 INJECTION, POWDER, FOR SOLUTION INTRAVENOUS at 06:50

## 2019-06-21 RX ADMIN — OXYCODONE HYDROCHLORIDE AND ACETAMINOPHEN 1 TABLET: 7.5; 325 TABLET ORAL at 09:33

## 2019-06-21 RX ADMIN — INSULIN LISPRO 2 UNITS: 100 INJECTION, SOLUTION INTRAVENOUS; SUBCUTANEOUS at 11:30

## 2019-06-21 NOTE — DISCHARGE SUMMARY
Hospitalist Discharge Summary     Patient ID:  Humberto Weiss  [id number]  84 y.o.  1950    PCP on record: Other, MD Simón    Admit date: 6/11/2019  Discharge date and time: 6/21/2019      DISCHARGE DIAGNOSIS:    R Diabetic Foot Ulcer complicated by cellulitis and acute Osteomyelitis of right calcaneus POA  PAD s/p R leg stent at [place] per pt in 5/2019   DM type 2 POA  CKD 2-3  Anemia      CONSULTATIONS:  IP CONSULT TO INFECTIOUS DISEASES    Excerpted HPI from H&P of Gutierrez Gotti MD:  CHIEF COMPLAINT: Foul smelling  Non healing R foot ulcer x 2 month     HISTORY OF PRESENT ILLNESS:     Genny Hickman is a 68 y.o.  male who presents with above complains from home ambulatory sent from [facility] [facility] today. Pt had been seen for the 1st time at [facility] office today for a non healing R foot Diabetic ulcer - sent to ER for further management. Pt was seen recently in ER on 6/8 & was DC home with OP follow up without any antibiotics per pt-- pt was found to be growing pseudomonas & enterococcal sp on wound Cx from 6/8/2019 & pt claims wound started smelling bad recently.     Pt was found to be non toxic in ER with unremarkable blood work but MRI R foot +ve for acute Osteomyelitis    ______________________________________________________________________  DISCHARGE SUMMARY/HOSPITAL COURSE:  for full details see H&P, daily progress notes, labs, consult notes. R Diabetic Foot Ulcer complicated by cellulitis and acute Osteomyelitis of right calcaneus POA  PAD s/p R leg stent at [place] per pt in 5/2019   Recent Wound Cx 6/8- growing Pseudomonas & enterococcal sp  MRI R foot noted- Acute OM of the calcaneus   Cultures of bone with  Strep, Ecoli, ( mixed anaerobic GNR beta lactamase negative labelled as wound ).  Cultures wound with Pseudomonas and E faecalis      IP Podiatry consulted-s/p I&D 6/12 and 6/17 Path :  Bone, submitted as \"bone margin, right calcaneus osteomyelitis\", biopsy:  Negative for acute osteomyelitis      Cont home ASA daily     -Podiatry recommendations appreciated:  Need to get a CAM boot to the patient, IV antibiotics x6 weeks, Wound VAC until granulation tissue covers all bone, protected weight-bearing right foot.     -ID recs appreciated:   IV Zosyn renally dosed per pharmacy   Check weekly CBC wt diff, CMP, ESR CRP  Plan for 6 weeks antibiotics till 7/29/19   F/U with Dr Austin Segura on 7/9/19 at 11 00 am   DC PICC once antibiotics completed      DC planning to [facility] once bed available     Newly diagnosed DM type 2 POA- in march 2019  - a1c 7.9  -restart metformin with SSI prn     CKD 2-3  Stable     Elevated BP  -BP has been a bit high, not on meds at home  -likely stress related.      Anemia  -chronic + blood loss from surgery. Follow - Hg stable     _______________________________________________________________________  Patient seen and examined by me on discharge day. Pertinent Findings:  Gen:    Not in distress  Chest: Clear lungs  CVS:   Regular rhythm. No edema  Abd:  Soft, not distended, not tender  Neuro:  Alert, Oriented x 4, grossly non focal exam  _______________________________________________________________________  DISCHARGE MEDICATIONS:   Current Discharge Medication List      START taking these medications    Details   aspirin delayed-release 81 mg tablet Take 1 Tab by mouth daily. bacitracin 500 unit/gram ointment Apply 1 Packet to affected area as needed (CVAD removal). enoxaparin (LOVENOX) 40 mg/0.4 mL 0.4 mL by SubCUTAneous route every twenty-four (24) hours. Qty: 30 Syringe, Refills: 0      insulin lispro (HUMALOG) 100 unit/mL injection AC (before meals), Q6H, and Q4H CORRECTIONAL SCALE only For Blood Sugar (mg/dl) of :             140-199=2 units            200-249=3 units  250-299=5 units  300-349=7 units  350 or greater = Call MD  Give in addition to basal medications.   Do Not Hold for NPO    BEDTIME CORRECTIONAL sliding scale w  Qty: 1 Vial, Refills: 0      L. acidoph & paracasei- S therm- Bifido (JENNIFER-Q/RISAQUAD) 8 billion cell cap cap Take 1 Cap by mouth daily. oxyCODONE-acetaminophen (PERCOCET 7.5) 7.5-325 mg per tablet Take 1 Tab by mouth every six (6) hours as needed for Pain for up to 3 days. Max Daily Amount: 4 Tabs. Qty: 20 Tab, Refills: 0    Associated Diagnoses: Acute osteomyelitis of right foot (HCC)      piperacillin-tazobactam 3.375 gram 3.375 g, ADDaptor 1 Device IVPB 3.375 g by IntraVENous route every eight (8) hours for 38 days. Qty: 60 Dose, Refills: 0         CONTINUE these medications which have NOT CHANGED    Details   metFORMIN (GLUCOPHAGE) 1,000 mg tablet Take 1,000 mg by mouth daily. My Recommended Diet, Activity, Wound Care, and follow-up labs are listed in the patient's Discharge Insturctions which I have personally completed and reviewed.   Risk of deterioration: Low    Condition at Discharge:  Stable  _____________________________________________________________________    Disposition  IP Rehab  ____________________________________________________________________    Care Plan discussed with:   Patient, Family, RN, Care Manager, Consultant    ____________________________________________________________________    Code Status: Full Code  ____________________________________________________________________      Condition at Discharge:  Stable  _____________________________________________________________________  Follow up with:   PCP : Simón Barr MD  Follow-up Information     Follow up With Specialties Details Why Contact Info    Simón Barr MD    Patient can only remember the practice name and not the physician                Total time in minutes spent coordinating this discharge (includes going over instructions, follow-up, prescriptions, and preparing report for sign off to her PCP) :  35 minutes    Signed:  Octavia Payan MD

## 2019-06-21 NOTE — WOUND CARE
nurse notified that patient is being discharged to [facility] today for continued care. Spoke with staff nurse who is familiar with wound VAC discharges to SNF's/other facilties. Staff nurse will remove wound vac dressing, leave Mepitel in place over abx beads, pack NS moist to dry for discharge. [facility]. Thank you Steph from [facility].  
 
Bay Lugo RN, [employer]

## 2019-06-21 NOTE — PROGRESS NOTES
Problem: Falls - Risk of  Goal: *Absence of Falls  Description  Document Alek Rose Fall Risk and appropriate interventions in the flowsheet.   Outcome: Progressing Towards Goal

## 2019-06-21 NOTE — DISCHARGE INSTRUCTIONS
HOSPITALIST DISCHARGE INSTRUCTIONS    NAME: Everardo Barnett   :  1950   MRN:  [id number]     Date/Time:  2019 1:28 PM    ADMIT DATE: 2019   DISCHARGE DATE: 2019     Attending Physician: Florencio Buck MD      Medications: Per above medication reconciliation. Pain Management: per above medications    Recommended diet: Diabetic Diet    Recommended activity: PT/OT Eval and Treat  CAM boot   Protected weight-bearing right foot. Wound care: Wound Vac change per routine. Continue Wound VAC until granulation tissue covers all bone or per Dr Dilip Aponte    Indwelling devices:  PICC line placed 2019    Supplemental Oxygen: None    Required Lab work: Per SNF routine    Glucose management:  Accucheck ACHS with sliding scale per SNF protocol    Code status: Full    -ID recs appreciated:   IV Zosyn renally dosed per pharmacy. Plan for 6 weeks antibiotics till 19     Check weekly CBC wt diff, CMP, ESR CRP. Fax results to Dr Barney Grider office    F/U with Dr Kerline Mackay on 19 at 11 00 am     DC PICC once antibiotics completed     Outside physician follow up: Follow-up Information     Follow up With Specialties Details Why Contact Info    Other, MD Simón    Patient can only remember the practice name and not the physician                 Skilled nursing facility/ SNF MD responsible for above on discharge. Information obtained by :  I understand that if any problems occur once I am at home I am to contact my physician. I understand and acknowledge receipt of the instructions indicated above.                                                                                                                                            Physician's or R.N.'s Signature                                                                  Date/Time                                                                                                                                              Patient or Repres

## 2019-06-21 NOTE — PROGRESS NOTES
MARSHALL Plan:    A) D/C to [facility]). Medicare pt has received, reviewed, and signed 2nd IM letter informing them of their right to appeal the discharge. Signed copy has been placed on pt bedside chart. Renetta Davis, MSW  [phone]      12:35 pm, Outbound call to Luis Carlos Hou [phone] @ [employer] [facility]. PT is going to Room 126. RN to call report to [phone]. Renetta Davis, MSW  [phone]    11:40 am, [facility] referral sent. Awaiting acceptance. Pt accepted for transport. Scheduled p/u time is 15:00 pm.      Renetta Davis, MSW  [phone]      11:29 am,  Inbound call from Luis Carlos Hou ([employer]). SW informed pt has been accepted. Wound Vac equipment has been ordered. Writer to contact Luis Carlos Hou x 1 hour for room assignment. [facility] referral to be sent. Renetta Davis, MSW  [phone]    MELANY faxed updated information to [phone]       Renetta Davis, MSW  [phone]

## 2019-06-21 NOTE — PROGRESS NOTES
[facility]                                                                        68 y.o.   male    [address]   Room: [facility] 1 MEDICAL ONCOLOGY  [facility] :  [facility] [address]   [address] [address]  Dept: [phone]  Loc: [phone]                    SITUATION     Admitted:  6/11/2019         Attending Provider:  Epi Reid MD       Consultations:  IP CONSULT TO INFECTIOUS DISEASES    PCP:  Elizabeth Mark MD   None    Treatment Team: Attending Provider: Epi Reid MD; Utilization Review: Krystal Murphy RN; Care Manager: Criselda Morfin    Admitting Dx:  Acute osteomyelitis of right foot ([facility] [address] [M86.171]  Diabetic foot ulcer ([facility] [address] [W99.100, L97.509]       Principal Problem: <principal problem not specified>    4 Days Post-Op of   Procedure(s):  DEBRIDEMENT WOUND AND BONE RIGHT HEEL APPLICATION OF WOUND VAC INSERTION OF ABSORBABLE ANTIBIOTIC BEADS   BY: Richard Knapp DPM             ON: 6/17/2019                  Code Status: Full Code                Advance Directives:   Advance Care Planning 6/12/2019   Confirm Advance Directive None    (Send w/patient)   Not Received       Isolation:  There are currently no Active Isolations       MDRO: No current active infections    Pain Medications given:  Percocet    Last dose: 6/21/2019 at  0933         BACKGROUND     Allergies:  No Known Allergies    Past Medical History:   Diagnosis Date    Diabetes ([facility] [address]     DVT (deep venous thrombosis) ([facility] [address] 05/2019    Right leg stent placed    Thromboembolus [facility])        Past Surgical History:   Procedure Laterality Date    VASCULAR SURGERY PROCEDURE UNLIST  05/2019    stents placed RLE (2)       Medications Prior to Admission   Medication Sig    metFORMIN (GLUCOPHAGE) 1,000 mg tablet Take 1,000 mg by mouth daily.        Hard scripts included in transfer packet yes    Vaccinations: There is no immunization history on file for this patient.          ASSESSMENT                Temp: 98.7 °F (37.1 °C) (06/21/19 0742) Pulse (Heart Rate): 62 (06/21/19 0742)     Resp Rate: 16 (06/21/19 0742)           BP: 144/67 (06/21/19 0742)     O2 Sat (%): 95 % (06/21/19 0742)     Weight: 80.3 kg (177 lb)    Height: 6' (182.9 cm) (06/12/19 1724)       If above not within 1 hour of discharge:    BP:_____  P:____  R:____ T:_____ O2 Sat: ___%  O2: ______    Active Orders   Diet    DIET DIABETIC CONSISTENT CARB Regular         Orientation: oriented to time, place, person and situation     Active Behaviors: None                                   Active Lines/Drains:  (Peg Tube / Segura / CL or S/L?): yes PICC line RUE    Urinary Status: Voiding     Last BM: Last Bowel Movement Date: 06/21/19     Skin Integrity: Wound (add Wound LDA)             Mobility: No limitations   Weight Bearing Status: PWB (Partial Weight Bearing %)      Gait Training  Assistive Device: Walker, rolling, Gait belt  Ambulation - Level of Assistance: Contact guard assistance, Minimal assistance, Assist x1  Distance (ft): 150 Feet (ft)         Lab Results   Component Value Date/Time    Glucose 112 (H) 06/21/2019 04:47 AM    Hemoglobin A1c 7.9 (H) 06/12/2019 05:23 AM    HGB 9.2 (L) 06/19/2019 03:46 AM    HGB 8.3 (L) 06/18/2019 01:46 AM        RECOMMENDATION     See After Visit Summary (AVS) for:  · Discharge instructions  · After [address]   · Special equipment needed (entered pre-discharge by Care Management)  · Medication Reconciliation    · Follow up Appointment(s)         Report given/sent by:  Arjun Rider                    Verbal report given to: Sandie Ganser         Estimated discharge time:  6/21/2019 at 1500

## 2019-06-21 NOTE — PROGRESS NOTES
Hospitalist Progress Note    NAME: Chandni Kessler   :  1950   MRN:  [id number]       Assessment / Plan:    R Diabetic Foot Ulcer complicated by cellulitis and acute Osteomyelitis of right calcaneus POA  PAD s/p R leg stent at [place] per pt in 2019   Recent Wound Cx - growing Pseudomonas & enterococcal sp  MRI R foot noted- Acute OM of the calcaneus   Cultures of bone with  Strep, Ecoli, ( mixed anaerobic GNR beta lactamase negative labelled as wound ). Cultures wound with Pseudomonas and E faecalis     IP Podiatry consulted-s/p I&D  and  Path :  Bone, submitted as \"bone margin, right calcaneus osteomyelitis\", biopsy:  Negative for acute osteomyelitis     Cont home ASA daily    -Podiatry recommendations appreciated:  Need to get a CAM boot to the patient, IV antibiotics x6 weeks, Wound VAC until granulation tissue covers all bone, protected weight-bearing right foot. -ID recs appreciated:   IV Zosyn renally dosed per pharmacy   Check weekly CBC wt diff, CMP, ESR CRP  Plan for 6 weeks antibiotics till 19   F/U with Dr Sergo Osborn on 19 at 11 00 am   DC PICC once antibiotics completed     DC planning to [place] rehab center once bed available    Newly diagnosed DM type 2 POA- in 2019  - a1c 7.9  -restart metformin. Continue SSI prn    CKD 2-3  Stable, follow    Elevated BP  -BP has been a bit high, not on meds at home  -likely stress related. Monitor    Anemia  -chronic + blood loss from surgery. Follow - Hg stable           Code Status: Full  Surrogate Decision Maker: friend Natalie Maguire # [phone]     DVT Prophylaxis: Sq heparin  GI Prophylaxis: not indicated  Baseline: Pt lives with a friend, ambulatory    18.5 - 24.9 Normal weight / Body mass index is 24.01 kg/m². Recommended Disposition: TBD ? rehab for IV abx, wound vac, PT     Subjective:     Chief Complaint / Reason for Physician Visit  Follow up Right foot OM, DM, HTN, anemia    Review of Systems:  Symptom Y/N Comments Symptom Y/N Comments   Fever/Chills    Chest Pain n    Poor Appetite n   Edema     Cough n   Abdominal Pain n    Sputum    Joint Pain     SOB/BLACK n   Pruritis/Rash     Nausea/vomit n   Tolerating PT/OT     Diarrhea    Tolerating Diet y    Constipation    Other       Could NOT obtain due to:      Objective:     VITALS:   Last 24hrs VS reviewed since prior progress note. Most recent are:  Patient Vitals for the past 24 hrs:   Temp Pulse Resp BP SpO2   06/21/19 0742 98.7 °F (37.1 °C) 62 16 144/67 95 %   06/20/19 2340 98.9 °F (37.2 °C) 61 16 140/73 97 %   06/20/19 1945 98.5 °F (36.9 °C) 68 15 147/81 99 %   06/20/19 1500 99.1 °F (37.3 °C) 64 12 140/74 98 %       Intake/Output Summary (Last 24 hours) at 6/21/2019 0942  Last data filed at 6/21/2019 8454  Gross per 24 hour   Intake 1100 ml   Output 800 ml   Net 300 ml        PHYSICAL EXAM:  PHYSICAL EXAM:  General: WD, WN. Alert, cooperative, no acute distress    EENT:  EOMI. Anicteric sclerae. MMM  Resp:  CTA bilaterally, no wheezing or rales. No accessory muscle use  CV:  Regular  rhythm,  No edema  GI:  Soft, Non distended, Non tender. +Bowel sounds  Neurologic:  Alert and oriented X 3, normal speech,   Psych:   Good insight. Not anxious nor agitated  Skin:  No rashes. No jaundice (+) right foot wound vac dressing        Reviewed most current lab test results and cultures  YES  Reviewed most current radiology test results   YES  Review and summation of old records today    NO  Reviewed patient's current orders and MAR    YES  PMH/ reviewed - no change compared to H&P  ________________________________________________________________________  Care Plan discussed with:    Comments   Patient y    Family      RN y    Care Manager     Consultant                        Multidiciplinary team rounds were held today with , nursing, pharmacist and clinical coordinator.   Patient's plan of care was discussed; medications were reviewed and discharge planning was addressed. ________________________________________________________________________  Total NON critical care TIME:  25   Minutes    Total CRITICAL CARE TIME Spent:   Minutes non procedure based      Comments   >50% of visit spent in counseling and coordination of care     ________________________________________________________________________  Fernando Mccoy MD     Procedures: see electronic medical records for all procedures/Xrays and details which were not copied into this note but were reviewed prior to creation of Plan. LABS:  I reviewed today's most current labs and imaging studies.   Pertinent labs include:  Recent Labs     06/19/19  0346   HGB 9.2*   HCT 29.2*     Recent Labs     06/21/19  0447 06/20/19  0407 06/19/19  0346    140 140   K 3.9 3.8 3.7    106 106   CO2 29 27 27   * 110* 99   BUN 13 15 15   CREA 1.13 1.15 1.15   CA 8.6 8.7 8.8       Signed: Fernando Mccoy MD

## 2019-06-27 NOTE — PROGRESS NOTES
6/27/2019    [facility]  requested discharge summary.   Faxed to:  [phone] CM  Ext [phone]

## 2019-07-22 LAB
BACTERIA SPEC CULT: NORMAL
SERVICE CMNT-IMP: NORMAL

## 2019-07-25 LAB
ACID FAST STN SPEC: NEGATIVE
MYCOBACTERIUM SPEC QL CULT: NEGATIVE
SPECIMEN PREPARATION: NORMAL
SPECIMEN SOURCE: NORMAL

## 2022-03-18 PROBLEM — M86.171 ACUTE OSTEOMYELITIS OF RIGHT FOOT (HCC): Status: ACTIVE | Noted: 2019-06-11

## 2022-03-18 PROBLEM — E11.628 CONTROLLED TYPE 2 DIABETES MELLITUS WITH SKIN COMPLICATION, WITHOUT LONG-TERM CURRENT USE OF INSULIN (HCC): Status: ACTIVE | Noted: 2019-06-11

## 2022-03-20 PROBLEM — L97.509 DIABETIC FOOT ULCER (HCC): Status: ACTIVE | Noted: 2019-06-11

## 2022-03-20 PROBLEM — E11.621 DIABETIC FOOT ULCER (HCC): Status: ACTIVE | Noted: 2019-06-11

## 2022-12-15 NOTE — PROGRESS NOTES
Bedside and Verbal shift change report given to Doretha Jimenez rn (oncoming nurse) by Brandee Carvajal (offgoing nurse). Report included the following information SBAR, Procedure Summary, Intake/Output, MAR and Recent Results. History/Exam/Medical Decision Making

## 2025-02-25 ENCOUNTER — APPOINTMENT (OUTPATIENT)
Facility: HOSPITAL | Age: 75
End: 2025-02-25
Payer: MEDICARE

## 2025-02-25 ENCOUNTER — HOSPITAL ENCOUNTER (EMERGENCY)
Facility: HOSPITAL | Age: 75
Discharge: HOME | End: 2025-02-25
Attending: EMERGENCY MEDICINE
Payer: MEDICARE

## 2025-02-25 VITALS
HEART RATE: 79 BPM | TEMPERATURE: 97 F | SYSTOLIC BLOOD PRESSURE: 148 MMHG | DIASTOLIC BLOOD PRESSURE: 72 MMHG | RESPIRATION RATE: 18 BRPM | OXYGEN SATURATION: 95 %

## 2025-02-25 DIAGNOSIS — T14.8XXA: Primary | ICD-10-CM

## 2025-02-25 PROCEDURE — 70450 CT HEAD/BRAIN W/O DYE: CPT

## 2025-02-25 PROCEDURE — 51701 INSERT BLADDER CATHETER: CPT

## 2025-02-25 PROCEDURE — 99284 EMERGENCY DEPT VISIT MOD MDM: CPT

## 2025-02-25 PROCEDURE — 6370000000 HC RX 637 (ALT 250 FOR IP): Performed by: EMERGENCY MEDICINE

## 2025-02-25 PROCEDURE — 72125 CT NECK SPINE W/O DYE: CPT

## 2025-02-25 PROCEDURE — 73590 X-RAY EXAM OF LOWER LEG: CPT

## 2025-02-25 RX ORDER — IBUPROFEN 600 MG/1
600 TABLET, FILM COATED ORAL
Status: COMPLETED | OUTPATIENT
Start: 2025-02-25 | End: 2025-02-25

## 2025-02-25 RX ADMIN — IBUPROFEN 600 MG: 600 TABLET, FILM COATED ORAL at 08:01

## 2025-02-25 ASSESSMENT — PAIN SCALES - GENERAL: PAINLEVEL_OUTOF10: 10

## 2025-02-25 NOTE — ED NOTES
Pt states he wants to leave right leg prothesis off has all stump covers and attachments in his la calling friend via cell phone-pt alert given d/c paper no questions states Dr. Valladares covered all questions-  aware if pt request get up call charge or triage to assist- patient also insturcted

## 2025-02-25 NOTE — ED NOTES
From home lives alone bilateral amputee, protheses removed no obvious deformity, states pain is at tip on stump, noted old abrasion posterior right stump

## 2025-02-25 NOTE — DISCHARGE INSTRUCTIONS
Thank You!    It was a pleasure taking care of you in our Emergency Department today. We know that when you come to our Emergency Department, you are entrusting us with your health, comfort, and safety. Our physicians and nurses honor that trust, and truly appreciate the opportunity to care for you and your loved ones.      We also value your feedback. If you receive a survey about your Emergency Department experience today, please fill it out.  We care about our patients' feedback, and we listen to what you have to say.  Thank you.    Shiv Valladares MD  ________________________________________________________________________  I have included a copy of your lab results and/or radiologic studies from today's visit so you can have them easily available at your follow-up visit. We hope you feel better and please do not hesitate to contact the ED if you have any questions at all!    No results found for this or any previous visit (from the past 12 hour(s)).  XR TIBIA FIBULA RIGHT (2 VIEWS)   Final Result   Soft tissue swelling of below knee amputation stump with no fracture   or other acute findings.      Electronically signed by Jesus Baez      CT CERVICAL SPINE WO CONTRAST   Final Result   1. No acute bony abnormality      2. Extensive multilevel degenerative change detailed by level above         Electronically signed by Zhao Sweet      CT HEAD WO CONTRAST   Final Result   No acute findings.      Electronically signed by Jesus Baez          The exam and treatment you received in the Emergency Department were for an urgent problem and are not intended as complete care. It is important that you follow up with a doctor, nurse practitioner, or physician assistant for ongoing care. If your symptoms become worse or you do not improve as expected and you are unable to reach your usual health care provider, you should return to the Emergency Department. We are available 24 hours a day.    Please take your discharge

## 2025-02-25 NOTE — ED PROVIDER NOTES
[facility] EMERGENCY DEPARTMENT  EMERGENCY DEPARTMENT ENCOUNTER       Pt Name: John Plascencia  MRN: [id number]  Birthdate 1950  Date of evaluation: 2/25/2025  Provider: Shiv Valladares MD   PCP: No primary care provider on file.  Note Started: 7:38 AM 2/25/25     CHIEF COMPLAINT       Chief Complaint   Patient presents with    Fall     Pt arrives via EMS to triage post mechanical GLF, pt has bilaterally lower leg prosthetics and c/o right knee pain. Denies LOC, denies hitting head        HISTORY OF PRESENT ILLNESS: 1 or more elements      History From: Patient, History limited by: None     John Plascencia is a 74 y.o. male with a history of diabetes, status post bilateral lower extremity BKA who presents following mechanical ground-level fall.  He reports he was getting out of his recliner when he tripped and fell.  He has been having pain in his right lower extremity stump since then.  Mild pain in the right knee, no hip pain.  He denies head trauma.  No chest pain no shortness of breath.     Nursing Notes were all reviewed and agreed with or any disagreements were addressed in the HPI.     REVIEW OF SYSTEMS        Positives and Pertinent negatives as per HPI.    PAST HISTORY     Past Medical History:  Past Medical History:   Diagnosis Date    Diabetes (HCC)     DVT (deep venous thrombosis) (HCC) 05/2019    Right leg stent placed    Thromboembolus (HCC)        Past Surgical History:  Past Surgical History:   Procedure Laterality Date    VASCULAR SURGERY  05/2019    stents placed RLE (2)       Family History:  History reviewed. No pertinent family history.    Social History:  Social History     Tobacco Use    Smoking status: Never    Smokeless tobacco: Never   Substance Use Topics    Alcohol use: Yes     Alcohol/week: 6.0 standard drinks of alcohol    Drug use: Never       Allergies:  No Known Allergies    CURRENT MEDICATIONS      Previous Medications    No medications on file       SCREENINGS

## 2025-02-28 ENCOUNTER — HOSPITAL ENCOUNTER (EMERGENCY)
Facility: HOSPITAL | Age: 75
Discharge: HOME | End: 2025-02-28
Attending: STUDENT IN AN ORGANIZED HEALTH CARE EDUCATION/TRAINING PROGRAM
Payer: MEDICARE

## 2025-02-28 ENCOUNTER — APPOINTMENT (OUTPATIENT)
Facility: HOSPITAL | Age: 75
End: 2025-02-28
Payer: MEDICARE

## 2025-02-28 VITALS
HEART RATE: 90 BPM | BODY MASS INDEX: 26.41 KG/M2 | OXYGEN SATURATION: 99 % | HEIGHT: 72 IN | RESPIRATION RATE: 15 BRPM | WEIGHT: 195 LBS | SYSTOLIC BLOOD PRESSURE: 173 MMHG | DIASTOLIC BLOOD PRESSURE: 97 MMHG | TEMPERATURE: 98.4 F

## 2025-02-28 DIAGNOSIS — N18.9 CHRONIC KIDNEY DISEASE, UNSPECIFIED CKD STAGE: ICD-10-CM

## 2025-02-28 DIAGNOSIS — R53.83 FATIGUE, UNSPECIFIED TYPE: Primary | ICD-10-CM

## 2025-02-28 LAB
ALBUMIN SERPL-MCNC: 3.7 G/DL (ref 3.5–5)
ALBUMIN/GLOB SERPL: 0.6 (ref 1.1–2.2)
ALP SERPL-CCNC: 116 U/L (ref 45–117)
ALT SERPL-CCNC: 46 U/L (ref 12–78)
AMORPH CRY URNS QL MICRO: ABNORMAL
ANION GAP SERPL CALC-SCNC: 4 MMOL/L (ref 2–12)
APPEARANCE UR: CLEAR
AST SERPL-CCNC: 186 U/L (ref 15–37)
BACTERIA URNS QL MICRO: NEGATIVE /HPF
BASOPHILS # BLD: 0.01 K/UL (ref 0–0.1)
BASOPHILS NFR BLD: 0.1 % (ref 0–1)
BILIRUB SERPL-MCNC: 1.8 MG/DL (ref 0.2–1)
BILIRUB UR QL: NEGATIVE
BUN SERPL-MCNC: 37 MG/DL (ref 6–20)
BUN/CREAT SERPL: 25 (ref 12–20)
CALCIUM SERPL-MCNC: 9.9 MG/DL (ref 8.5–10.1)
CHLORIDE SERPL-SCNC: 105 MMOL/L (ref 97–108)
CO2 SERPL-SCNC: 28 MMOL/L (ref 21–32)
COLOR UR: ABNORMAL
CREAT SERPL-MCNC: 1.46 MG/DL (ref 0.7–1.3)
DIFFERENTIAL METHOD BLD: ABNORMAL
EOSINOPHIL # BLD: 0.18 K/UL (ref 0–0.4)
EOSINOPHIL NFR BLD: 2.7 % (ref 0–7)
EPITH CASTS URNS QL MICRO: ABNORMAL /LPF
ERYTHROCYTE [DISTWIDTH] IN BLOOD BY AUTOMATED COUNT: 12.4 % (ref 11.5–14.5)
FLUAV RNA SPEC QL NAA+PROBE: NOT DETECTED
FLUBV RNA SPEC QL NAA+PROBE: NOT DETECTED
GLOBULIN SER CALC-MCNC: 5.8 G/DL (ref 2–4)
GLUCOSE SERPL-MCNC: 140 MG/DL (ref 65–100)
GLUCOSE UR STRIP.AUTO-MCNC: NEGATIVE MG/DL
GRAN CASTS URNS QL MICRO: ABNORMAL /LPF
HCT VFR BLD AUTO: 40.6 % (ref 36.6–50.3)
HGB BLD-MCNC: 13.3 G/DL (ref 12.1–17)
HGB UR QL STRIP: ABNORMAL
HYALINE CASTS URNS QL MICRO: ABNORMAL /LPF (ref 0–5)
IMM GRANULOCYTES # BLD AUTO: 0.02 K/UL (ref 0–0.04)
IMM GRANULOCYTES NFR BLD AUTO: 0.3 % (ref 0–0.5)
KETONES UR QL STRIP.AUTO: ABNORMAL MG/DL
LEUKOCYTE ESTERASE UR QL STRIP.AUTO: NEGATIVE
LYMPHOCYTES # BLD: 0.67 K/UL (ref 0.8–3.5)
LYMPHOCYTES NFR BLD: 9.8 % (ref 12–49)
MAGNESIUM SERPL-MCNC: 2.2 MG/DL (ref 1.6–2.4)
MCH RBC QN AUTO: 29.8 PG (ref 26–34)
MCHC RBC AUTO-ENTMCNC: 32.8 G/DL (ref 30–36.5)
MCV RBC AUTO: 90.8 FL (ref 80–99)
MONOCYTES # BLD: 0.79 K/UL (ref 0–1)
MONOCYTES NFR BLD: 11.6 % (ref 5–13)
NEUTS SEG # BLD: 5.13 K/UL (ref 1.8–8)
NEUTS SEG NFR BLD: 75.5 % (ref 32–75)
NITRITE UR QL STRIP.AUTO: NEGATIVE
NRBC # BLD: 0 K/UL (ref 0–0.01)
NRBC BLD-RTO: 0 PER 100 WBC
PH UR STRIP: 5 (ref 5–8)
PLATELET # BLD AUTO: 225 K/UL (ref 150–400)
PMV BLD AUTO: 10.7 FL (ref 8.9–12.9)
POTASSIUM SERPL-SCNC: 4.2 MMOL/L (ref 3.5–5.1)
PROT SERPL-MCNC: 9.5 G/DL (ref 6.4–8.2)
PROT UR STRIP-MCNC: 100 MG/DL
RBC # BLD AUTO: 4.47 M/UL (ref 4.1–5.7)
RBC #/AREA URNS HPF: ABNORMAL /HPF (ref 0–5)
RBC MORPH BLD: ABNORMAL
SARS-COV-2 RNA RESP QL NAA+PROBE: NOT DETECTED
SODIUM SERPL-SCNC: 137 MMOL/L (ref 136–145)
SOURCE: NORMAL
SP GR UR REFRACTOMETRY: 1.02
TROPONIN I SERPL HS-MCNC: 23 NG/L (ref 0–76)
TSH SERPL DL<=0.05 MIU/L-ACNC: 1.7 UIU/ML (ref 0.36–3.74)
URINE CULTURE IF INDICATED: ABNORMAL
UROBILINOGEN UR QL STRIP.AUTO: 1 EU/DL (ref 0.2–1)
WBC # BLD AUTO: 6.8 K/UL (ref 4.1–11.1)
WBC URNS QL MICRO: ABNORMAL /HPF (ref 0–4)

## 2025-02-28 PROCEDURE — 93005 ELECTROCARDIOGRAM TRACING: CPT | Performed by: STUDENT IN AN ORGANIZED HEALTH CARE EDUCATION/TRAINING PROGRAM

## 2025-02-28 PROCEDURE — 99285 EMERGENCY DEPT VISIT HI MDM: CPT

## 2025-02-28 PROCEDURE — 36415 COLL VENOUS BLD VENIPUNCTURE: CPT

## 2025-02-28 PROCEDURE — 84443 ASSAY THYROID STIM HORMONE: CPT

## 2025-02-28 PROCEDURE — 81001 URINALYSIS AUTO W/SCOPE: CPT

## 2025-02-28 PROCEDURE — 85025 COMPLETE CBC W/AUTO DIFF WBC: CPT

## 2025-02-28 PROCEDURE — 71045 X-RAY EXAM CHEST 1 VIEW: CPT

## 2025-02-28 PROCEDURE — 2580000003 HC RX 258: Performed by: STUDENT IN AN ORGANIZED HEALTH CARE EDUCATION/TRAINING PROGRAM

## 2025-02-28 PROCEDURE — 83735 ASSAY OF MAGNESIUM: CPT

## 2025-02-28 PROCEDURE — 87636 SARSCOV2 & INF A&B AMP PRB: CPT

## 2025-02-28 PROCEDURE — 80053 COMPREHEN METABOLIC PANEL: CPT

## 2025-02-28 PROCEDURE — 84484 ASSAY OF TROPONIN QUANT: CPT

## 2025-02-28 RX ORDER — 0.9 % SODIUM CHLORIDE 0.9 %
1000 INTRAVENOUS SOLUTION INTRAVENOUS ONCE
Status: COMPLETED | OUTPATIENT
Start: 2025-02-28 | End: 2025-02-28

## 2025-02-28 RX ADMIN — SODIUM CHLORIDE 1000 ML: 0.9 INJECTION, SOLUTION INTRAVENOUS at 14:27

## 2025-02-28 ASSESSMENT — PAIN - FUNCTIONAL ASSESSMENT: PAIN_FUNCTIONAL_ASSESSMENT: NONE - DENIES PAIN

## 2025-02-28 NOTE — ED PROVIDER NOTES
[facility] EMERGENCY DEPARTMENT  EMERGENCY DEPARTMENT ENCOUNTER       Pt Name: John Plascencia  MRN: [id number]  Birthdate 1950  Date of evaluation: 2/28/2025  Provider: Kj Muniz MD   PCP: No primary care provider on file.  Note Started: 12:13 PM EST 2/28/25     CHIEF COMPLAINT       Chief Complaint   Patient presents with    Fatigue     Patient with increased weakness over the past 2 days.  Patient also reports a decreased appetite.  Bilateral BKA.         HISTORY OF PRESENT ILLNESS: 1 or more elements      History From: patient, ems, History limited by: ams?     John Plascencia is a 74 y.o. male presenting with weakness.  History very limited as patient notes he doesn't know what going on, story change.  Notes he has a lot of problems but unable to tell any of them specifically.  He notes his neighbor checked on him today and called 911.  Unsure reason.  Per EMS - he has been fatigued for 2 days with decreased appetite.  Patient denies abdominal pain, cp, sob, cough, dizziness, NV, fever.         Please See MDM for Additional Details of the HPI/PMH  Nursing Notes were all reviewed and agreed with or any disagreements were addressed in the HPI.     REVIEW OF SYSTEMS        Positives and Pertinent negatives as per HPI.    PAST HISTORY     Past Medical History:  Past Medical History:   Diagnosis Date    Diabetes (HCC)     DVT (deep venous thrombosis) (HCC) 05/2019    Right leg stent placed    Thromboembolus (HCC)        Past Surgical History:  Past Surgical History:   Procedure Laterality Date    VASCULAR SURGERY  05/2019    stents placed RLE (2)       Family History:  No family history on file.    Social History:  Social History     Tobacco Use    Smoking status: Never    Smokeless tobacco: Never   Substance Use Topics    Alcohol use: Yes     Alcohol/week: 6.0 standard drinks of alcohol    Drug use: Never       Allergies:  No Known Allergies    CURRENT MEDICATIONS      Previous  Medications    No medications on file       SCREENINGS               No data recorded            PHYSICAL EXAM      ED Triage Vitals [02/28/25 1144]   Encounter Vitals Group      BP (!) 162/90      Systolic BP Percentile       Diastolic BP Percentile       Pulse 81      Respirations 16      Temp 98.4 °F (36.9 °C)      Temp Source Oral      SpO2 100 %      Weight - Scale 88.5 kg (195 lb)      Height 1.829 m (6')      Head Circumference       Peak Flow       Pain Score       Pain Loc       Pain Education       Exclude from Growth Chart         Physical Exam  Vitals and nursing note reviewed.   Constitutional:       Comments: Unkempt, malodorous   Eyes:      Extraocular Movements: Extraocular movements intact.      Pupils: Pupils are equal, round, and reactive to light.   Cardiovascular:      Rate and Rhythm: Normal rate and regular rhythm.   Pulmonary:      Effort: Pulmonary effort is normal.   Abdominal:      General: Abdomen is flat.   Musculoskeletal:      Comments: BL BKA   Skin:     General: Skin is warm.   Neurological:      Motor: No weakness.   Psychiatric:         Mood and Affect: Mood normal.          DIAGNOSTIC RESULTS   LABS:     Recent Results (from the past 24 hour(s))   EKG 12 Lead    Collection Time: 02/28/25 12:13 PM   Result Value Ref Range    Ventricular Rate 86 BPM    Atrial Rate 86 BPM    P-R Interval 320 ms    QRS Duration 94 ms    Q-T Interval 346 ms    QTc Calculation (Bazett) 414 ms    P Axis 11 degrees    R Axis 33 degrees    T Axis -38 degrees    Diagnosis       Sinus rhythm with 1st degree AV block  T wave abnormality, consider anterior ischemia  When compared with ECG of 11-JUN-2019 18:58,  Nonspecific T wave abnormality, worse in Inferior leads  Nonspecific T wave abnormality now evident in Lateral leads     CBC with Auto Differential    Collection Time: 02/28/25 12:39 PM   Result Value Ref Range    WBC 6.8 4.1 - 11.1 K/uL    RBC 4.47 4.10 - 5.70 M/uL    Hemoglobin 13.3 12.1 - 17.0 g/dL

## 2025-03-01 LAB
EKG ATRIAL RATE: 86 BPM
EKG DIAGNOSIS: NORMAL
EKG P AXIS: 11 DEGREES
EKG P-R INTERVAL: 320 MS
EKG Q-T INTERVAL: 346 MS
EKG QRS DURATION: 94 MS
EKG QTC CALCULATION (BAZETT): 414 MS
EKG R AXIS: 33 DEGREES
EKG T AXIS: -38 DEGREES
EKG VENTRICULAR RATE: 86 BPM

## 2025-03-01 NOTE — ED NOTES
Discharge medications reviewed with the patient and friend and appropriate educational materials and side effects teaching were provided. Patient transported via [facility].

## 2025-03-04 ENCOUNTER — CARE COORDINATION (OUTPATIENT)
Dept: OTHER | Facility: CLINIC | Age: 75
End: 2025-03-04

## 2025-03-07 ENCOUNTER — CARE COORDINATION (OUTPATIENT)
Dept: OTHER | Facility: CLINIC | Age: 75
End: 2025-03-07

## 2025-03-07 NOTE — CARE COORDINATION
Care Transitions Note    Initial Call - Call within 2 business days of discharge: Yes    Attempted to reach patient for transitions of care follow up. Unable to reach patient.    Outreach Attempts:   HIPAA compliant voicemail left for patient.     Patient: John Plascencia    Patient : 1950   MRN: [id number]    Reason for Admission: Right femur fracture, Failure to Thrive  Discharge Date: 3/6/25   RURS: No data recorded    Was this an external facility discharge? Yes. Discharge Date: 25. Facility Name: [facility]    Follow Up Appointment:   Patient does not have a follow up appointment scheduled at time of call.  CTN will address with patient if able to engage.    Initial outreach to patient after recent IP hospitalization. HIPAA compliant voicemail message left with request for return call. CTN will continue to follow.    Plan for follow-up on next business day.      ALONA Ortez RN  Ambulatory Care Manager  Phone: [phone]  Email: [email or web address]

## 2025-03-10 ENCOUNTER — CARE COORDINATION (OUTPATIENT)
Dept: OTHER | Facility: CLINIC | Age: 75
End: 2025-03-10

## 2025-03-10 NOTE — CARE COORDINATION
Care Transitions Note    Initial Call - Call within 2 business days of discharge: Yes    Attempted to reach patient for transitions of care follow up. Unable to reach patient.    Outreach Attempts:   Multiple attempts to contact patient at phone numbers on file.   HIPAA compliant voicemail left for patient.     Patient: John Plascencia    Patient : 1950   MRN: [id number]    Reason for Admission: Right femur fracture, Failure to Thrive   Discharge Date: 3/6/25   RURS: No data recorded    Was this an external facility discharge? Yes. Discharge Date: 25. Facility Name: [facility]    Follow Up Appointment:   Patient does not have a follow up appointment scheduled at time of call.  CTN will discuss with patient if able to engage.    Second attempt for initial SCOOTER outreach after recent IP hospitalization. HIPAA compliant voicemail message left with request for return call. CTN will continue to follow.     Plan for follow-up call in 2-5 days     ALONA Ortez RN  Ambulatory Care Manager  Phone: [phone]  Email: [email or web address]

## 2025-03-20 ENCOUNTER — CARE COORDINATION (OUTPATIENT)
Dept: OTHER | Facility: CLINIC | Age: 75
End: 2025-03-20

## 2025-03-20 NOTE — CARE COORDINATION
Care Transitions Note    Final Call     Attempted to reach patient for transitions of care follow up.  Unable to reach patient.      Outreach Attempts:   Multiple attempts to contact patient at phone numbers on file.   Unable to leave message.     Patient closed (unable to reach patient) from the Care Transitions program on 3/20/25.      Handoff:   Patient referred back to [facility] Gianluca for continued management.     Care Summary Note: CTN attempted to outreach patient while covering for CTN Gianluca. Patient unavailable on both numbers and unable to leave a message. Patient will transition into HR program.                Michelle PYLE RN  Ambulatory    [phone]  [email or web address]

## 2025-03-31 ENCOUNTER — CARE COORDINATION (OUTPATIENT)
Dept: OTHER | Facility: CLINIC | Age: 75
End: 2025-03-31

## 2025-03-31 NOTE — CARE COORDINATION
Care Transitions Note    Chart reviewed for CM outreach. Patient was discharged from [facility] on 3/28/25 to [facility]. Patient is currently admitted to [facility]. CTN will monitor chart for discharge and outreach at that time.    Dea Quinonez -[phone]

## 2025-04-04 ENCOUNTER — CARE COORDINATION (OUTPATIENT)
Dept: OTHER | Facility: CLINIC | Age: 75
End: 2025-04-04

## 2025-04-04 NOTE — CARE COORDINATION
Care Transitions Note    CTN called [facility] as patient was recently admitted from his SNF on 3/31/25.  confirmed patient is still admitted to the hospital at this time. CTN will continue to monitor.           Michelle PYLE RN  [phone]

## 2025-04-09 ENCOUNTER — CARE COORDINATION (OUTPATIENT)
Dept: OTHER | Facility: CLINIC | Age: 75
End: 2025-04-09

## 2025-04-09 NOTE — CARE COORDINATION
Care Transitions Note    Chart reviewed for SCOOTER outreach. CTN called [facility], patient remains admitted to hospital at this time. CTN will continue to monitor.    eDa Quinonez -[phone]

## 2025-04-11 ENCOUNTER — CARE COORDINATION (OUTPATIENT)
Dept: OTHER | Facility: CLINIC | Age: 75
End: 2025-04-11

## 2025-04-11 NOTE — CARE COORDINATION
Care Transitions Note    Chart reviewed for SCOOTER outreach. Patient remains admitted to [facility]. CTN will refer to [facility] for chart monitoring.    Dea Quinonez -[phone]

## 2025-04-24 ENCOUNTER — HOSPITAL ENCOUNTER (OUTPATIENT)
Facility: HOSPITAL | Age: 75
Discharge: HOME | End: 2025-04-27
Attending: HOSPITALIST | Admitting: HOSPITALIST

## 2025-04-24 ENCOUNTER — CARE COORDINATION (OUTPATIENT)
Dept: OTHER | Facility: CLINIC | Age: 75
End: 2025-04-24

## 2025-04-24 LAB
ALBUMIN SERPL-MCNC: 1.7 G/DL (ref 3.5–5)
ALBUMIN/GLOB SERPL: 0.3 (ref 1.1–2.2)
ALP SERPL-CCNC: 642 U/L (ref 45–117)
ALT SERPL-CCNC: 99 U/L (ref 12–78)
ANION GAP SERPL CALC-SCNC: 3 MMOL/L (ref 2–12)
AST SERPL-CCNC: 88 U/L (ref 15–37)
BILIRUB SERPL-MCNC: 0.3 MG/DL (ref 0.2–1)
BUN SERPL-MCNC: 22 MG/DL (ref 6–20)
BUN/CREAT SERPL: 27 (ref 12–20)
CALCIUM SERPL-MCNC: 8.1 MG/DL (ref 8.5–10.1)
CHLORIDE SERPL-SCNC: 106 MMOL/L (ref 97–108)
CO2 SERPL-SCNC: 28 MMOL/L (ref 21–32)
CREAT SERPL-MCNC: 0.82 MG/DL (ref 0.7–1.3)
ERYTHROCYTE [DISTWIDTH] IN BLOOD BY AUTOMATED COUNT: 16.9 % (ref 11.5–14.5)
EST. AVERAGE GLUCOSE BLD GHB EST-MCNC: 126 MG/DL
GLOBULIN SER CALC-MCNC: 5.1 G/DL (ref 2–4)
GLUCOSE SERPL-MCNC: 127 MG/DL (ref 65–100)
HBA1C MFR BLD: 6 % (ref 4–5.6)
HCT VFR BLD AUTO: 26.8 % (ref 36.6–50.3)
HGB BLD-MCNC: 8.4 G/DL (ref 12.1–17)
MAGNESIUM SERPL-MCNC: 1.6 MG/DL (ref 1.6–2.4)
MCH RBC QN AUTO: 28.1 PG (ref 26–34)
MCHC RBC AUTO-ENTMCNC: 31.3 G/DL (ref 30–36.5)
MCV RBC AUTO: 89.6 FL (ref 80–99)
NRBC # BLD: 0 K/UL (ref 0–0.01)
NRBC BLD-RTO: 0 PER 100 WBC
PHOSPHATE SERPL-MCNC: 2.7 MG/DL (ref 2.6–4.7)
PLATELET # BLD AUTO: 301 K/UL (ref 150–400)
PMV BLD AUTO: 10.8 FL (ref 8.9–12.9)
POTASSIUM SERPL-SCNC: 4.3 MMOL/L (ref 3.5–5.1)
PROT SERPL-MCNC: 6.8 G/DL (ref 6.4–8.2)
RBC # BLD AUTO: 2.99 M/UL (ref 4.1–5.7)
SODIUM SERPL-SCNC: 137 MMOL/L (ref 136–145)
WBC # BLD AUTO: 6.4 K/UL (ref 4.1–11.1)

## 2025-04-24 PROCEDURE — 84100 ASSAY OF PHOSPHORUS: CPT

## 2025-04-24 PROCEDURE — 85027 COMPLETE CBC AUTOMATED: CPT

## 2025-04-24 PROCEDURE — 80053 COMPREHEN METABOLIC PANEL: CPT

## 2025-04-24 PROCEDURE — 83735 ASSAY OF MAGNESIUM: CPT

## 2025-04-24 PROCEDURE — 83036 HEMOGLOBIN GLYCOSYLATED A1C: CPT

## 2025-04-24 NOTE — CARE COORDINATION
Chart reviewed for CM outreach. Patient admitted to [facility] and not [facility]. SCOOTER and HRCM programs closed.    Dea Quinonez -[phone]

## 2025-04-26 LAB
ANION GAP SERPL CALC-SCNC: 5 MMOL/L (ref 2–12)
BUN SERPL-MCNC: 19 MG/DL (ref 6–20)
BUN/CREAT SERPL: 21 (ref 12–20)
CALCIUM SERPL-MCNC: 8.8 MG/DL (ref 8.5–10.1)
CHLORIDE SERPL-SCNC: 104 MMOL/L (ref 97–108)
CO2 SERPL-SCNC: 29 MMOL/L (ref 21–32)
COMMENT:: NORMAL
CREAT SERPL-MCNC: 0.92 MG/DL (ref 0.7–1.3)
GLUCOSE SERPL-MCNC: 89 MG/DL (ref 65–100)
MAGNESIUM SERPL-MCNC: 1.9 MG/DL (ref 1.6–2.4)
PHOSPHATE SERPL-MCNC: 3.2 MG/DL (ref 2.6–4.7)
POTASSIUM SERPL-SCNC: 4.2 MMOL/L (ref 3.5–5.1)
SODIUM SERPL-SCNC: 138 MMOL/L (ref 136–145)
SPECIMEN HOLD: NORMAL

## 2025-04-26 PROCEDURE — 83735 ASSAY OF MAGNESIUM: CPT

## 2025-04-26 PROCEDURE — 84100 ASSAY OF PHOSPHORUS: CPT

## 2025-04-26 PROCEDURE — 80048 BASIC METABOLIC PNL TOTAL CA: CPT

## 2025-04-28 LAB
ALBUMIN SERPL-MCNC: 1.9 G/DL (ref 3.5–5)
ALBUMIN/GLOB SERPL: 0.4 (ref 1.1–2.2)
ALP SERPL-CCNC: 608 U/L (ref 45–117)
ALT SERPL-CCNC: 62 U/L (ref 12–78)
ANION GAP SERPL CALC-SCNC: 6 MMOL/L (ref 2–12)
AST SERPL-CCNC: 61 U/L (ref 15–37)
BILIRUB SERPL-MCNC: 0.4 MG/DL (ref 0.2–1)
BUN SERPL-MCNC: 18 MG/DL (ref 6–20)
BUN/CREAT SERPL: 17 (ref 12–20)
CALCIUM SERPL-MCNC: 8.8 MG/DL (ref 8.5–10.1)
CHLORIDE SERPL-SCNC: 105 MMOL/L (ref 97–108)
CO2 SERPL-SCNC: 27 MMOL/L (ref 21–32)
CREAT SERPL-MCNC: 1.07 MG/DL (ref 0.7–1.3)
ERYTHROCYTE [DISTWIDTH] IN BLOOD BY AUTOMATED COUNT: 16.4 % (ref 11.5–14.5)
GLOBULIN SER CALC-MCNC: 5.3 G/DL (ref 2–4)
GLUCOSE SERPL-MCNC: 145 MG/DL (ref 65–100)
HCT VFR BLD AUTO: 28.4 % (ref 36.6–50.3)
HGB BLD-MCNC: 9 G/DL (ref 12.1–17)
MAGNESIUM SERPL-MCNC: 1.9 MG/DL (ref 1.6–2.4)
MCH RBC QN AUTO: 28.4 PG (ref 26–34)
MCHC RBC AUTO-ENTMCNC: 31.7 G/DL (ref 30–36.5)
MCV RBC AUTO: 89.6 FL (ref 80–99)
NRBC # BLD: 0 K/UL (ref 0–0.01)
NRBC BLD-RTO: 0 PER 100 WBC
PHOSPHATE SERPL-MCNC: 3.1 MG/DL (ref 2.6–4.7)
PLATELET # BLD AUTO: 418 K/UL (ref 150–400)
PMV BLD AUTO: 10.4 FL (ref 8.9–12.9)
POTASSIUM SERPL-SCNC: 4.2 MMOL/L (ref 3.5–5.1)
PROT SERPL-MCNC: 7.2 G/DL (ref 6.4–8.2)
RBC # BLD AUTO: 3.17 M/UL (ref 4.1–5.7)
SODIUM SERPL-SCNC: 138 MMOL/L (ref 136–145)
WBC # BLD AUTO: 5.2 K/UL (ref 4.1–11.1)

## 2025-04-28 PROCEDURE — 83735 ASSAY OF MAGNESIUM: CPT

## 2025-04-28 PROCEDURE — 80053 COMPREHEN METABOLIC PANEL: CPT

## 2025-04-28 PROCEDURE — 84100 ASSAY OF PHOSPHORUS: CPT

## 2025-04-28 PROCEDURE — 85027 COMPLETE CBC AUTOMATED: CPT

## 2025-04-29 LAB
ANION GAP SERPL CALC-SCNC: 7 MMOL/L (ref 2–12)
BUN SERPL-MCNC: 26 MG/DL (ref 6–20)
BUN/CREAT SERPL: 23 (ref 12–20)
CALCIUM SERPL-MCNC: 8.7 MG/DL (ref 8.5–10.1)
CHLORIDE SERPL-SCNC: 104 MMOL/L (ref 97–108)
CO2 SERPL-SCNC: 27 MMOL/L (ref 21–32)
CREAT SERPL-MCNC: 1.11 MG/DL (ref 0.7–1.3)
GLUCOSE SERPL-MCNC: 151 MG/DL (ref 65–100)
MAGNESIUM SERPL-MCNC: 1.9 MG/DL (ref 1.6–2.4)
PHOSPHATE SERPL-MCNC: 2 MG/DL (ref 2.6–4.7)
POTASSIUM SERPL-SCNC: 4.6 MMOL/L (ref 3.5–5.1)
SODIUM SERPL-SCNC: 138 MMOL/L (ref 136–145)

## 2025-04-29 PROCEDURE — 83735 ASSAY OF MAGNESIUM: CPT

## 2025-04-29 PROCEDURE — 84100 ASSAY OF PHOSPHORUS: CPT

## 2025-04-29 PROCEDURE — 80048 BASIC METABOLIC PNL TOTAL CA: CPT

## 2025-05-01 LAB
ALBUMIN SERPL-MCNC: 1.9 G/DL (ref 3.5–5)
ALBUMIN/GLOB SERPL: 0.3 (ref 1.1–2.2)
ALP SERPL-CCNC: 640 U/L (ref 45–117)
ALT SERPL-CCNC: 99 U/L (ref 12–78)
ANION GAP SERPL CALC-SCNC: 9 MMOL/L (ref 2–12)
AST SERPL-CCNC: 106 U/L (ref 15–37)
BILIRUB SERPL-MCNC: 0.3 MG/DL (ref 0.2–1)
BUN SERPL-MCNC: 33 MG/DL (ref 6–20)
BUN/CREAT SERPL: 26 (ref 12–20)
CALCIUM SERPL-MCNC: 8.6 MG/DL (ref 8.5–10.1)
CHLORIDE SERPL-SCNC: 105 MMOL/L (ref 97–108)
CO2 SERPL-SCNC: 24 MMOL/L (ref 21–32)
CREAT SERPL-MCNC: 1.28 MG/DL (ref 0.7–1.3)
CRP SERPL-MCNC: 6.09 MG/DL (ref 0–0.3)
ERYTHROCYTE [SEDIMENTATION RATE] IN BLOOD: 127 MM/HR (ref 0–20)
GLOBULIN SER CALC-MCNC: 5.5 G/DL (ref 2–4)
GLUCOSE SERPL-MCNC: 181 MG/DL (ref 65–100)
MAGNESIUM SERPL-MCNC: 1.9 MG/DL (ref 1.6–2.4)
PHOSPHATE SERPL-MCNC: 2.1 MG/DL (ref 2.6–4.7)
POTASSIUM SERPL-SCNC: 4.8 MMOL/L (ref 3.5–5.1)
PROT SERPL-MCNC: 7.4 G/DL (ref 6.4–8.2)
SODIUM SERPL-SCNC: 138 MMOL/L (ref 136–145)

## 2025-05-01 PROCEDURE — 83735 ASSAY OF MAGNESIUM: CPT

## 2025-05-01 PROCEDURE — 80053 COMPREHEN METABOLIC PANEL: CPT

## 2025-05-01 PROCEDURE — 85652 RBC SED RATE AUTOMATED: CPT

## 2025-05-01 PROCEDURE — 84100 ASSAY OF PHOSPHORUS: CPT

## 2025-05-01 PROCEDURE — 86140 C-REACTIVE PROTEIN: CPT

## 2025-05-03 LAB
ANION GAP SERPL CALC-SCNC: 5 MMOL/L (ref 2–12)
BUN SERPL-MCNC: 32 MG/DL (ref 6–20)
BUN/CREAT SERPL: 30 (ref 12–20)
CALCIUM SERPL-MCNC: 8.7 MG/DL (ref 8.5–10.1)
CHLORIDE SERPL-SCNC: 106 MMOL/L (ref 97–108)
CO2 SERPL-SCNC: 26 MMOL/L (ref 21–32)
CREAT SERPL-MCNC: 1.07 MG/DL (ref 0.7–1.3)
GLUCOSE SERPL-MCNC: 156 MG/DL (ref 65–100)
MAGNESIUM SERPL-MCNC: 1.8 MG/DL (ref 1.6–2.4)
PHOSPHATE SERPL-MCNC: 2.5 MG/DL (ref 2.6–4.7)
POTASSIUM SERPL-SCNC: 4.6 MMOL/L (ref 3.5–5.1)
SODIUM SERPL-SCNC: 137 MMOL/L (ref 136–145)

## 2025-05-03 PROCEDURE — 80048 BASIC METABOLIC PNL TOTAL CA: CPT

## 2025-05-03 PROCEDURE — 83735 ASSAY OF MAGNESIUM: CPT

## 2025-05-03 PROCEDURE — 84100 ASSAY OF PHOSPHORUS: CPT

## 2025-05-05 LAB
ALBUMIN SERPL-MCNC: 1.9 G/DL (ref 3.5–5)
ALBUMIN/GLOB SERPL: 0.3 (ref 1.1–2.2)
ALP SERPL-CCNC: 567 U/L (ref 45–117)
ALT SERPL-CCNC: 93 U/L (ref 12–78)
ANION GAP SERPL CALC-SCNC: 2 MMOL/L (ref 2–12)
AST SERPL-CCNC: 87 U/L (ref 15–37)
BILIRUB SERPL-MCNC: 0.3 MG/DL (ref 0.2–1)
BUN SERPL-MCNC: 29 MG/DL (ref 6–20)
BUN/CREAT SERPL: 28 (ref 12–20)
CALCIUM SERPL-MCNC: 9.4 MG/DL (ref 8.5–10.1)
CHLORIDE SERPL-SCNC: 104 MMOL/L (ref 97–108)
CO2 SERPL-SCNC: 30 MMOL/L (ref 21–32)
CREAT SERPL-MCNC: 1.05 MG/DL (ref 0.7–1.3)
ERYTHROCYTE [DISTWIDTH] IN BLOOD BY AUTOMATED COUNT: 17 % (ref 11.5–14.5)
GLOBULIN SER CALC-MCNC: 5.5 G/DL (ref 2–4)
GLUCOSE SERPL-MCNC: 140 MG/DL (ref 65–100)
HCT VFR BLD AUTO: 28.4 % (ref 36.6–50.3)
HGB BLD-MCNC: 8.8 G/DL (ref 12.1–17)
MAGNESIUM SERPL-MCNC: 2 MG/DL (ref 1.6–2.4)
MCH RBC QN AUTO: 28.3 PG (ref 26–34)
MCHC RBC AUTO-ENTMCNC: 31 G/DL (ref 30–36.5)
MCV RBC AUTO: 91.3 FL (ref 80–99)
NRBC # BLD: 0 K/UL (ref 0–0.01)
NRBC BLD-RTO: 0 PER 100 WBC
PHOSPHATE SERPL-MCNC: 4.4 MG/DL (ref 2.6–4.7)
PLATELET # BLD AUTO: 471 K/UL (ref 150–400)
PMV BLD AUTO: 10.6 FL (ref 8.9–12.9)
POTASSIUM SERPL-SCNC: 4.8 MMOL/L (ref 3.5–5.1)
PROT SERPL-MCNC: 7.4 G/DL (ref 6.4–8.2)
RBC # BLD AUTO: 3.11 M/UL (ref 4.1–5.7)
SODIUM SERPL-SCNC: 136 MMOL/L (ref 136–145)
WBC # BLD AUTO: 7.3 K/UL (ref 4.1–11.1)

## 2025-05-06 LAB
ANION GAP SERPL CALC-SCNC: 4 MMOL/L (ref 2–12)
BUN SERPL-MCNC: 31 MG/DL (ref 6–20)
BUN/CREAT SERPL: 31 (ref 12–20)
CALCIUM SERPL-MCNC: 9.3 MG/DL (ref 8.5–10.1)
CHLORIDE SERPL-SCNC: 104 MMOL/L (ref 97–108)
CO2 SERPL-SCNC: 29 MMOL/L (ref 21–32)
CREAT SERPL-MCNC: 1 MG/DL (ref 0.7–1.3)
GLUCOSE SERPL-MCNC: 133 MG/DL (ref 65–100)
MAGNESIUM SERPL-MCNC: 2 MG/DL (ref 1.6–2.4)
PHOSPHATE SERPL-MCNC: 3.7 MG/DL (ref 2.6–4.7)
POTASSIUM SERPL-SCNC: 4.7 MMOL/L (ref 3.5–5.1)
SODIUM SERPL-SCNC: 137 MMOL/L (ref 136–145)

## 2025-05-08 LAB
ALBUMIN SERPL-MCNC: 2.4 G/DL (ref 3.5–5)
ALBUMIN/GLOB SERPL: 0.4 (ref 1.1–2.2)
ALP SERPL-CCNC: 629 U/L (ref 45–117)
ALT SERPL-CCNC: 107 U/L (ref 12–78)
ANION GAP SERPL CALC-SCNC: 6 MMOL/L (ref 2–12)
AST SERPL-CCNC: 90 U/L (ref 15–37)
BILIRUB SERPL-MCNC: 0.3 MG/DL (ref 0.2–1)
BUN SERPL-MCNC: 30 MG/DL (ref 6–20)
BUN/CREAT SERPL: 27 (ref 12–20)
CALCIUM SERPL-MCNC: 9.3 MG/DL (ref 8.5–10.1)
CHLORIDE SERPL-SCNC: 104 MMOL/L (ref 97–108)
CO2 SERPL-SCNC: 27 MMOL/L (ref 21–32)
CREAT SERPL-MCNC: 1.1 MG/DL (ref 0.7–1.3)
GLOBULIN SER CALC-MCNC: 5.7 G/DL (ref 2–4)
GLUCOSE SERPL-MCNC: 134 MG/DL (ref 65–100)
MAGNESIUM SERPL-MCNC: 2 MG/DL (ref 1.6–2.4)
PHOSPHATE SERPL-MCNC: 4.3 MG/DL (ref 2.6–4.7)
POTASSIUM SERPL-SCNC: 5 MMOL/L (ref 3.5–5.1)
PROT SERPL-MCNC: 8.1 G/DL (ref 6.4–8.2)
SODIUM SERPL-SCNC: 137 MMOL/L (ref 136–145)

## 2025-05-12 LAB
ALBUMIN SERPL-MCNC: 2.4 G/DL (ref 3.5–5)
ALBUMIN/GLOB SERPL: 0.4 (ref 1.1–2.2)
ALP SERPL-CCNC: 570 U/L (ref 45–117)
ALT SERPL-CCNC: 77 U/L (ref 12–78)
ANION GAP SERPL CALC-SCNC: 4 MMOL/L (ref 2–12)
AST SERPL-CCNC: 75 U/L (ref 15–37)
BILIRUB SERPL-MCNC: 0.4 MG/DL (ref 0.2–1)
BUN SERPL-MCNC: 36 MG/DL (ref 6–20)
BUN/CREAT SERPL: 29 (ref 12–20)
CALCIUM SERPL-MCNC: 9 MG/DL (ref 8.5–10.1)
CHLORIDE SERPL-SCNC: 105 MMOL/L (ref 97–108)
CO2 SERPL-SCNC: 27 MMOL/L (ref 21–32)
CREAT SERPL-MCNC: 1.23 MG/DL (ref 0.7–1.3)
ERYTHROCYTE [DISTWIDTH] IN BLOOD BY AUTOMATED COUNT: 16.6 % (ref 11.5–14.5)
GLOBULIN SER CALC-MCNC: 5.5 G/DL (ref 2–4)
GLUCOSE SERPL-MCNC: 116 MG/DL (ref 65–100)
HCT VFR BLD AUTO: 27.9 % (ref 36.6–50.3)
HGB BLD-MCNC: 8.8 G/DL (ref 12.1–17)
MAGNESIUM SERPL-MCNC: 2.2 MG/DL (ref 1.6–2.4)
MCH RBC QN AUTO: 28.5 PG (ref 26–34)
MCHC RBC AUTO-ENTMCNC: 31.5 G/DL (ref 30–36.5)
MCV RBC AUTO: 90.3 FL (ref 80–99)
NRBC # BLD: 0 K/UL (ref 0–0.01)
NRBC BLD-RTO: 0 PER 100 WBC
PHOSPHATE SERPL-MCNC: 3.6 MG/DL (ref 2.6–4.7)
PLATELET # BLD AUTO: 423 K/UL (ref 150–400)
PMV BLD AUTO: 10.6 FL (ref 8.9–12.9)
POTASSIUM SERPL-SCNC: 4.5 MMOL/L (ref 3.5–5.1)
PROT SERPL-MCNC: 7.9 G/DL (ref 6.4–8.2)
RBC # BLD AUTO: 3.09 M/UL (ref 4.1–5.7)
SODIUM SERPL-SCNC: 136 MMOL/L (ref 136–145)
WBC # BLD AUTO: 6 K/UL (ref 4.1–11.1)

## 2025-05-13 LAB
ANION GAP SERPL CALC-SCNC: 6 MMOL/L (ref 2–12)
BUN SERPL-MCNC: 32 MG/DL (ref 6–20)
BUN/CREAT SERPL: 28 (ref 12–20)
CALCIUM SERPL-MCNC: 9.4 MG/DL (ref 8.5–10.1)
CHLORIDE SERPL-SCNC: 106 MMOL/L (ref 97–108)
CO2 SERPL-SCNC: 26 MMOL/L (ref 21–32)
CREAT SERPL-MCNC: 1.14 MG/DL (ref 0.7–1.3)
GLUCOSE SERPL-MCNC: 97 MG/DL (ref 65–100)
MAGNESIUM SERPL-MCNC: 2.2 MG/DL (ref 1.6–2.4)
PHOSPHATE SERPL-MCNC: 3.5 MG/DL (ref 2.6–4.7)
POTASSIUM SERPL-SCNC: 5 MMOL/L (ref 3.5–5.1)
SODIUM SERPL-SCNC: 138 MMOL/L (ref 136–145)

## 2025-05-14 LAB
ANION GAP SERPL CALC-SCNC: 4 MMOL/L (ref 2–12)
BUN SERPL-MCNC: 38 MG/DL (ref 6–20)
BUN/CREAT SERPL: 34 (ref 12–20)
CALCIUM SERPL-MCNC: 9.5 MG/DL (ref 8.5–10.1)
CHLORIDE SERPL-SCNC: 107 MMOL/L (ref 97–108)
CO2 SERPL-SCNC: 26 MMOL/L (ref 21–32)
CREAT SERPL-MCNC: 1.13 MG/DL (ref 0.7–1.3)
GLUCOSE SERPL-MCNC: 85 MG/DL (ref 65–100)
POTASSIUM SERPL-SCNC: 4.6 MMOL/L (ref 3.5–5.1)
SODIUM SERPL-SCNC: 137 MMOL/L (ref 136–145)

## 2025-05-15 LAB
ALBUMIN SERPL-MCNC: 2.4 G/DL (ref 3.5–5)
ALBUMIN/GLOB SERPL: 0.4 (ref 1.1–2.2)
ALP SERPL-CCNC: 647 U/L (ref 45–117)
ALT SERPL-CCNC: 102 U/L (ref 12–78)
ANION GAP SERPL CALC-SCNC: 9 MMOL/L (ref 2–12)
APPEARANCE UR: CLEAR
AST SERPL-CCNC: 80 U/L (ref 15–37)
BACTERIA URNS QL MICRO: ABNORMAL /HPF
BILIRUB SERPL-MCNC: 0.3 MG/DL (ref 0.2–1)
BILIRUB UR QL: NEGATIVE
BUN SERPL-MCNC: 54 MG/DL (ref 6–20)
BUN/CREAT SERPL: 45 (ref 12–20)
CALCIUM SERPL-MCNC: 9.6 MG/DL (ref 8.5–10.1)
CHLORIDE SERPL-SCNC: 110 MMOL/L (ref 97–108)
CO2 SERPL-SCNC: 23 MMOL/L (ref 21–32)
COLOR UR: ABNORMAL
CREAT SERPL-MCNC: 1.19 MG/DL (ref 0.7–1.3)
CRP SERPL-MCNC: 2.33 MG/DL (ref 0–0.3)
EPITH CASTS URNS QL MICRO: ABNORMAL /LPF
ERYTHROCYTE [SEDIMENTATION RATE] IN BLOOD: 128 MM/HR (ref 0–20)
GLOBULIN SER CALC-MCNC: 6 G/DL (ref 2–4)
GLUCOSE SERPL-MCNC: 94 MG/DL (ref 65–100)
GLUCOSE UR STRIP.AUTO-MCNC: NEGATIVE MG/DL
HGB UR QL STRIP: NEGATIVE
KETONES UR QL STRIP.AUTO: NEGATIVE MG/DL
LEUKOCYTE ESTERASE UR QL STRIP.AUTO: NEGATIVE
MAGNESIUM SERPL-MCNC: 2.3 MG/DL (ref 1.6–2.4)
NITRITE UR QL STRIP.AUTO: NEGATIVE
PH UR STRIP: 5 (ref 5–8)
PHOSPHATE SERPL-MCNC: 3.8 MG/DL (ref 2.6–4.7)
POTASSIUM SERPL-SCNC: 4.4 MMOL/L (ref 3.5–5.1)
PROT SERPL-MCNC: 8.4 G/DL (ref 6.4–8.2)
PROT UR STRIP-MCNC: 30 MG/DL
RBC #/AREA URNS HPF: ABNORMAL /HPF (ref 0–5)
SODIUM SERPL-SCNC: 142 MMOL/L (ref 136–145)
SP GR UR REFRACTOMETRY: 1.03 (ref 1–1.03)
URINE CULTURE IF INDICATED: ABNORMAL
UROBILINOGEN UR QL STRIP.AUTO: 0.2 EU/DL (ref 0.2–1)
WBC URNS QL MICRO: ABNORMAL /HPF (ref 0–4)

## 2025-05-16 LAB
ALBUMIN SERPL-MCNC: 2.4 G/DL (ref 3.5–5)
ALBUMIN/GLOB SERPL: 0.4 (ref 1.1–2.2)
ALP SERPL-CCNC: 640 U/L (ref 45–117)
ALT SERPL-CCNC: 108 U/L (ref 12–78)
ANION GAP SERPL CALC-SCNC: 6 MMOL/L (ref 2–12)
AST SERPL-CCNC: 98 U/L (ref 15–37)
BILIRUB SERPL-MCNC: 0.2 MG/DL (ref 0.2–1)
BUN SERPL-MCNC: 48 MG/DL (ref 6–20)
BUN/CREAT SERPL: 44 (ref 12–20)
CALCIUM SERPL-MCNC: 9.6 MG/DL (ref 8.5–10.1)
CHLORIDE SERPL-SCNC: 111 MMOL/L (ref 97–108)
CO2 SERPL-SCNC: 25 MMOL/L (ref 21–32)
CREAT SERPL-MCNC: 1.1 MG/DL (ref 0.7–1.3)
ERYTHROCYTE [DISTWIDTH] IN BLOOD BY AUTOMATED COUNT: 16.9 % (ref 11.5–14.5)
GLOBULIN SER CALC-MCNC: 5.9 G/DL (ref 2–4)
GLUCOSE SERPL-MCNC: 137 MG/DL (ref 65–100)
HCT VFR BLD AUTO: 31 % (ref 36.6–50.3)
HGB BLD-MCNC: 9.5 G/DL (ref 12.1–17)
LACTATE SERPL-SCNC: 2 MMOL/L (ref 0.4–2)
MCH RBC QN AUTO: 28.3 PG (ref 26–34)
MCHC RBC AUTO-ENTMCNC: 30.6 G/DL (ref 30–36.5)
MCV RBC AUTO: 92.3 FL (ref 80–99)
NRBC # BLD: 0 K/UL (ref 0–0.01)
NRBC BLD-RTO: 0 PER 100 WBC
PLATELET # BLD AUTO: 401 K/UL (ref 150–400)
PMV BLD AUTO: 10.3 FL (ref 8.9–12.9)
POTASSIUM SERPL-SCNC: 4.3 MMOL/L (ref 3.5–5.1)
PROT SERPL-MCNC: 8.3 G/DL (ref 6.4–8.2)
RBC # BLD AUTO: 3.36 M/UL (ref 4.1–5.7)
SODIUM SERPL-SCNC: 142 MMOL/L (ref 136–145)
WBC # BLD AUTO: 6.7 K/UL (ref 4.1–11.1)

## 2025-05-17 LAB
ANION GAP SERPL CALC-SCNC: 6 MMOL/L (ref 2–12)
BUN SERPL-MCNC: 39 MG/DL (ref 6–20)
BUN/CREAT SERPL: 39 (ref 12–20)
CALCIUM SERPL-MCNC: 9.5 MG/DL (ref 8.5–10.1)
CHLORIDE SERPL-SCNC: 107 MMOL/L (ref 97–108)
CO2 SERPL-SCNC: 27 MMOL/L (ref 21–32)
CREAT SERPL-MCNC: 1 MG/DL (ref 0.7–1.3)
GLUCOSE SERPL-MCNC: 94 MG/DL (ref 65–100)
MAGNESIUM SERPL-MCNC: 2.2 MG/DL (ref 1.6–2.4)
PHOSPHATE SERPL-MCNC: 3.7 MG/DL (ref 2.6–4.7)
POTASSIUM SERPL-SCNC: 4.7 MMOL/L (ref 3.5–5.1)
SODIUM SERPL-SCNC: 140 MMOL/L (ref 136–145)

## 2025-05-19 LAB
ALBUMIN SERPL-MCNC: 2.5 G/DL (ref 3.5–5)
ALBUMIN/GLOB SERPL: 0.4 (ref 1.1–2.2)
ALP SERPL-CCNC: 660 U/L (ref 45–117)
ALT SERPL-CCNC: 106 U/L (ref 12–78)
ANION GAP SERPL CALC-SCNC: 9 MMOL/L (ref 2–12)
AST SERPL-CCNC: 97 U/L (ref 15–37)
BILIRUB SERPL-MCNC: 0.3 MG/DL (ref 0.2–1)
BUN SERPL-MCNC: 36 MG/DL (ref 6–20)
BUN/CREAT SERPL: 31 (ref 12–20)
CALCIUM SERPL-MCNC: 9.6 MG/DL (ref 8.5–10.1)
CHLORIDE SERPL-SCNC: 107 MMOL/L (ref 97–108)
CO2 SERPL-SCNC: 24 MMOL/L (ref 21–32)
CREAT SERPL-MCNC: 1.15 MG/DL (ref 0.7–1.3)
ERYTHROCYTE [DISTWIDTH] IN BLOOD BY AUTOMATED COUNT: 16.5 % (ref 11.5–14.5)
GLOBULIN SER CALC-MCNC: 6 G/DL (ref 2–4)
GLUCOSE SERPL-MCNC: 71 MG/DL (ref 65–100)
HCT VFR BLD AUTO: 30.3 % (ref 36.6–50.3)
HGB BLD-MCNC: 9.6 G/DL (ref 12.1–17)
MAGNESIUM SERPL-MCNC: 2.2 MG/DL (ref 1.6–2.4)
MCH RBC QN AUTO: 28.9 PG (ref 26–34)
MCHC RBC AUTO-ENTMCNC: 31.7 G/DL (ref 30–36.5)
MCV RBC AUTO: 91.3 FL (ref 80–99)
NRBC # BLD: 0 K/UL (ref 0–0.01)
NRBC BLD-RTO: 0 PER 100 WBC
PHOSPHATE SERPL-MCNC: 4.3 MG/DL (ref 2.6–4.7)
PLATELET # BLD AUTO: 388 K/UL (ref 150–400)
PMV BLD AUTO: 10.3 FL (ref 8.9–12.9)
POTASSIUM SERPL-SCNC: 4.9 MMOL/L (ref 3.5–5.1)
PROT SERPL-MCNC: 8.5 G/DL (ref 6.4–8.2)
RBC # BLD AUTO: 3.32 M/UL (ref 4.1–5.7)
SODIUM SERPL-SCNC: 140 MMOL/L (ref 136–145)
WBC # BLD AUTO: 6.7 K/UL (ref 4.1–11.1)

## 2025-06-01 ENCOUNTER — APPOINTMENT (OUTPATIENT)
Facility: HOSPITAL | Age: 75
End: 2025-06-01
Payer: MEDICARE

## 2025-06-01 ENCOUNTER — HOSPITAL ENCOUNTER (EMERGENCY)
Facility: HOSPITAL | Age: 75
Discharge: HOME | End: 2025-06-02
Attending: EMERGENCY MEDICINE
Payer: MEDICARE

## 2025-06-01 DIAGNOSIS — L03.317 CELLULITIS OF BUTTOCK: Primary | ICD-10-CM

## 2025-06-01 DIAGNOSIS — Z93.3 COLOSTOMY IN PLACE (HCC): ICD-10-CM

## 2025-06-01 LAB
ALBUMIN SERPL-MCNC: 2.5 G/DL (ref 3.5–5)
ALBUMIN/GLOB SERPL: 0.5 (ref 1.1–2.2)
ALP SERPL-CCNC: 459 U/L (ref 45–117)
ALT SERPL-CCNC: 51 U/L (ref 12–78)
ANION GAP SERPL CALC-SCNC: 6 MMOL/L (ref 2–12)
APPEARANCE UR: CLEAR
AST SERPL-CCNC: 38 U/L (ref 15–37)
BACTERIA URNS QL MICRO: NEGATIVE /HPF
BASOPHILS # BLD: 0.04 K/UL (ref 0–0.1)
BASOPHILS NFR BLD: 0.6 % (ref 0–1)
BILIRUB SERPL-MCNC: 0.2 MG/DL (ref 0.2–1)
BILIRUB UR QL: NEGATIVE
BUN SERPL-MCNC: 40 MG/DL (ref 6–20)
BUN/CREAT SERPL: 42 (ref 12–20)
CALCIUM SERPL-MCNC: 9.1 MG/DL (ref 8.5–10.1)
CHLORIDE SERPL-SCNC: 106 MMOL/L (ref 97–108)
CO2 SERPL-SCNC: 28 MMOL/L (ref 21–32)
COLOR UR: NORMAL
CREAT SERPL-MCNC: 0.95 MG/DL (ref 0.7–1.3)
DIFFERENTIAL METHOD BLD: ABNORMAL
EOSINOPHIL # BLD: 0.15 K/UL (ref 0–0.4)
EOSINOPHIL NFR BLD: 2.1 % (ref 0–7)
EPITH CASTS URNS QL MICRO: NORMAL /LPF
ERYTHROCYTE [DISTWIDTH] IN BLOOD BY AUTOMATED COUNT: 16.2 % (ref 11.5–14.5)
GLOBULIN SER CALC-MCNC: 5.3 G/DL (ref 2–4)
GLUCOSE BLD STRIP.AUTO-MCNC: 111 MG/DL (ref 65–117)
GLUCOSE SERPL-MCNC: 100 MG/DL (ref 65–100)
GLUCOSE UR STRIP.AUTO-MCNC: NEGATIVE MG/DL
HCT VFR BLD AUTO: 29.1 % (ref 36.6–50.3)
HGB BLD-MCNC: 9.2 G/DL (ref 12.1–17)
HGB UR QL STRIP: NEGATIVE
HYALINE CASTS URNS QL MICRO: NORMAL /LPF (ref 0–2)
IMM GRANULOCYTES # BLD AUTO: 0.02 K/UL (ref 0–0.04)
IMM GRANULOCYTES NFR BLD AUTO: 0.3 % (ref 0–0.5)
KETONES UR QL STRIP.AUTO: NEGATIVE MG/DL
LACTATE BLD-SCNC: 0.87 MMOL/L (ref 0.4–2)
LEUKOCYTE ESTERASE UR QL STRIP.AUTO: NEGATIVE
LYMPHOCYTES # BLD: 2.66 K/UL (ref 0.8–3.5)
LYMPHOCYTES NFR BLD: 38.1 % (ref 12–49)
MCH RBC QN AUTO: 29 PG (ref 26–34)
MCHC RBC AUTO-ENTMCNC: 31.6 G/DL (ref 30–36.5)
MCV RBC AUTO: 91.8 FL (ref 80–99)
MONOCYTES # BLD: 0.55 K/UL (ref 0–1)
MONOCYTES NFR BLD: 7.9 % (ref 5–13)
NEUTS SEG # BLD: 3.57 K/UL (ref 1.8–8)
NEUTS SEG NFR BLD: 51 % (ref 32–75)
NITRITE UR QL STRIP.AUTO: NEGATIVE
NRBC # BLD: 0 K/UL (ref 0–0.01)
NRBC BLD-RTO: 0 PER 100 WBC
PH UR STRIP: 5.5 (ref 5–8)
PLATELET # BLD AUTO: 376 K/UL (ref 150–400)
PMV BLD AUTO: 9.8 FL (ref 8.9–12.9)
POTASSIUM SERPL-SCNC: 4.1 MMOL/L (ref 3.5–5.1)
PROCALCITONIN SERPL-MCNC: 0.1 NG/ML
PROT SERPL-MCNC: 7.8 G/DL (ref 6.4–8.2)
PROT UR STRIP-MCNC: NEGATIVE MG/DL
RBC # BLD AUTO: 3.17 M/UL (ref 4.1–5.7)
RBC #/AREA URNS HPF: NORMAL /HPF (ref 0–5)
SERVICE CMNT-IMP: NORMAL
SODIUM SERPL-SCNC: 140 MMOL/L (ref 136–145)
SP GR UR REFRACTOMETRY: 1.02
TROPONIN I SERPL HS-MCNC: 11 NG/L (ref 0–76)
URINE CULTURE IF INDICATED: NORMAL
UROBILINOGEN UR QL STRIP.AUTO: 0.2 EU/DL (ref 0.2–1)
WBC # BLD AUTO: 7 K/UL (ref 4.1–11.1)
WBC URNS QL MICRO: NORMAL /HPF (ref 0–4)

## 2025-06-01 PROCEDURE — 96367 TX/PROPH/DG ADDL SEQ IV INF: CPT

## 2025-06-01 PROCEDURE — 84145 PROCALCITONIN (PCT): CPT

## 2025-06-01 PROCEDURE — 87040 BLOOD CULTURE FOR BACTERIA: CPT

## 2025-06-01 PROCEDURE — 6360000004 HC RX CONTRAST MEDICATION: Performed by: EMERGENCY MEDICINE

## 2025-06-01 PROCEDURE — 82962 GLUCOSE BLOOD TEST: CPT

## 2025-06-01 PROCEDURE — 87154 CUL TYP ID BLD PTHGN 6+ TRGT: CPT

## 2025-06-01 PROCEDURE — 85025 COMPLETE CBC W/AUTO DIFF WBC: CPT

## 2025-06-01 PROCEDURE — 81001 URINALYSIS AUTO W/SCOPE: CPT

## 2025-06-01 PROCEDURE — 87077 CULTURE AEROBIC IDENTIFY: CPT

## 2025-06-01 PROCEDURE — 99285 EMERGENCY DEPT VISIT HI MDM: CPT

## 2025-06-01 PROCEDURE — 2580000003 HC RX 258: Performed by: EMERGENCY MEDICINE

## 2025-06-01 PROCEDURE — 83605 ASSAY OF LACTIC ACID: CPT

## 2025-06-01 PROCEDURE — 6360000002 HC RX W HCPCS: Performed by: EMERGENCY MEDICINE

## 2025-06-01 PROCEDURE — 84484 ASSAY OF TROPONIN QUANT: CPT

## 2025-06-01 PROCEDURE — 72193 CT PELVIS W/DYE: CPT

## 2025-06-01 PROCEDURE — 93005 ELECTROCARDIOGRAM TRACING: CPT | Performed by: EMERGENCY MEDICINE

## 2025-06-01 PROCEDURE — 80053 COMPREHEN METABOLIC PANEL: CPT

## 2025-06-01 PROCEDURE — 36415 COLL VENOUS BLD VENIPUNCTURE: CPT

## 2025-06-01 PROCEDURE — 96365 THER/PROPH/DIAG IV INF INIT: CPT

## 2025-06-01 PROCEDURE — 87186 SC STD MICRODIL/AGAR DIL: CPT

## 2025-06-01 RX ORDER — SULFAMETHOXAZOLE AND TRIMETHOPRIM 800; 160 MG/1; MG/1
1 TABLET ORAL 2 TIMES DAILY
Qty: 20 TABLET | Refills: 0 | Status: SHIPPED | OUTPATIENT
Start: 2025-06-01 | End: 2025-06-11

## 2025-06-01 RX ORDER — VANCOMYCIN 1.25 G/250ML
1750 INJECTION, SOLUTION INTRAVENOUS ONCE
Status: DISCONTINUED | OUTPATIENT
Start: 2025-06-01 | End: 2025-06-01

## 2025-06-01 RX ORDER — CEPHALEXIN 500 MG/1
500 CAPSULE ORAL 3 TIMES DAILY
Qty: 21 CAPSULE | Refills: 0 | Status: SHIPPED | OUTPATIENT
Start: 2025-06-01 | End: 2025-06-08

## 2025-06-01 RX ORDER — IOPAMIDOL 755 MG/ML
100 INJECTION, SOLUTION INTRAVASCULAR
Status: COMPLETED | OUTPATIENT
Start: 2025-06-01 | End: 2025-06-01

## 2025-06-01 RX ADMIN — SODIUM CHLORIDE 1500 MG: 0.9 INJECTION, SOLUTION INTRAVENOUS at 23:04

## 2025-06-01 RX ADMIN — PIPERACILLIN AND TAZOBACTAM 4500 MG: 4; .5 INJECTION, POWDER, LYOPHILIZED, FOR SOLUTION INTRAVENOUS at 22:17

## 2025-06-01 RX ADMIN — IOPAMIDOL 100 ML: 755 INJECTION, SOLUTION INTRAVENOUS at 22:38

## 2025-06-01 ASSESSMENT — LIFESTYLE VARIABLES
HOW MANY STANDARD DRINKS CONTAINING ALCOHOL DO YOU HAVE ON A TYPICAL DAY: PATIENT DOES NOT DRINK
HOW OFTEN DO YOU HAVE A DRINK CONTAINING ALCOHOL: NEVER

## 2025-06-01 NOTE — ED PROVIDER NOTES
[facility] EMERGENCY DEPARTMENT  EMERGENCY DEPARTMENT ENCOUNTER       Pt Name: John Plascencia  MRN: [id number]  Birthdate 1950  Date of evaluation: 6/1/2025  Provider: Shiv Valladares MD   PCP: No primary care provider on file.  Note Started: 7:17 AM 6/1/25     CHIEF COMPLAINT       Chief Complaint   Patient presents with    Colostomy Problem      Pt BIBEMS from home with c/o colostomy problem. Pt was released from rehab on Thursday and says he was not sent with replacement colostomy bags. Today the bag exploded. Pt has bed sores and family is concerned for infection d/t the leakage from the bag.      HISTORY OF PRESENT ILLNESS: 1 or more elements      History From: Patient, History limited by: None     John Plascencia is a 75 y.o. male who presents to the Emergency Department due to a burst colostomy bag. The patient recently underwent colostomy surgery on Thursday, just a few days prior to this visit.    The patient reports that his colostomy bag burst today while he was at home. He denies any pain in the belly, blood in the stool, vomiting, or nausea. The stool is described as light brown in color. Mr. Pruitt states that he has been feeling well otherwise since the surgery and has been doing okay.    Further history obtained, patient has a large sacral wound which has had increased discharge.  He was discharged from rehab on Thursday, he does have follow-up wound care scheduled tomorrow for his sacral wound.    Nursing Notes were all reviewed and agreed with or any disagreements were addressed in the HPI.     REVIEW OF SYSTEMS        Positives and Pertinent negatives as per HPI.    PAST HISTORY     Past Medical History:  Past Medical History:   Diagnosis Date    Diabetes (HCC)     DVT (deep venous thrombosis) (HCC) 05/2019    Right leg stent placed    Thromboembolus (HCC)        Past Surgical History:  Past Surgical History:   Procedure Laterality Date    VASCULAR SURGERY  05/2019    stents placed

## 2025-06-02 VITALS
DIASTOLIC BLOOD PRESSURE: 70 MMHG | SYSTOLIC BLOOD PRESSURE: 159 MMHG | TEMPERATURE: 98.7 F | BODY MASS INDEX: 20.24 KG/M2 | HEART RATE: 80 BPM | WEIGHT: 149.25 LBS | RESPIRATION RATE: 14 BRPM | OXYGEN SATURATION: 99 %

## 2025-06-02 LAB
ACB COMPLEX DNA BLD POS QL NAA+NON-PROBE: NOT DETECTED
ACCESSION NUMBER, LLC1M: ABNORMAL
B FRAGILIS DNA BLD POS QL NAA+NON-PROBE: NOT DETECTED
BIOFIRE TEST COMMENT: ABNORMAL
C ALBICANS DNA BLD POS QL NAA+NON-PROBE: NOT DETECTED
C AURIS DNA BLD POS QL NAA+NON-PROBE: NOT DETECTED
C GATTII+NEOFOR DNA BLD POS QL NAA+N-PRB: NOT DETECTED
C GLABRATA DNA BLD POS QL NAA+NON-PROBE: NOT DETECTED
C KRUSEI DNA BLD POS QL NAA+NON-PROBE: NOT DETECTED
C PARAP DNA BLD POS QL NAA+NON-PROBE: NOT DETECTED
C TROPICLS DNA BLD POS QL NAA+NON-PROBE: NOT DETECTED
E CLOAC COMP DNA BLD POS NAA+NON-PROBE: NOT DETECTED
E COLI DNA BLD POS QL NAA+NON-PROBE: NOT DETECTED
E FAECALIS DNA BLD POS QL NAA+NON-PROBE: DETECTED
E FAECIUM DNA BLD POS QL NAA+NON-PROBE: NOT DETECTED
EKG ATRIAL RATE: 87 BPM
EKG DIAGNOSIS: NORMAL
EKG P AXIS: 69 DEGREES
EKG P-R INTERVAL: 276 MS
EKG Q-T INTERVAL: 362 MS
EKG QRS DURATION: 84 MS
EKG QTC CALCULATION (BAZETT): 435 MS
EKG R AXIS: 36 DEGREES
EKG T AXIS: 59 DEGREES
EKG VENTRICULAR RATE: 87 BPM
ENTEROBACTERALES DNA BLD POS NAA+N-PRB: NOT DETECTED
GP B STREP DNA BLD POS QL NAA+NON-PROBE: NOT DETECTED
HAEM INFLU DNA BLD POS QL NAA+NON-PROBE: NOT DETECTED
K OXYTOCA DNA BLD POS QL NAA+NON-PROBE: NOT DETECTED
KLEBSIELLA SP DNA BLD POS QL NAA+NON-PRB: NOT DETECTED
KLEBSIELLA SP DNA BLD POS QL NAA+NON-PRB: NOT DETECTED
L MONOCYTOG DNA BLD POS QL NAA+NON-PROBE: NOT DETECTED
N MEN DNA BLD POS QL NAA+NON-PROBE: NOT DETECTED
P AERUGINOSA DNA BLD POS NAA+NON-PROBE: NOT DETECTED
PROTEUS SP DNA BLD POS QL NAA+NON-PROBE: NOT DETECTED
RESISTANT GENE TARGETS: ABNORMAL
S AUREUS DNA BLD POS QL NAA+NON-PROBE: NOT DETECTED
S AUREUS+CONS DNA BLD POS NAA+NON-PROBE: NOT DETECTED
S EPIDERMIDIS DNA BLD POS QL NAA+NON-PRB: NOT DETECTED
S LUGDUNENSIS DNA BLD POS QL NAA+NON-PRB: NOT DETECTED
S MALTOPHILIA DNA BLD POS QL NAA+NON-PRB: NOT DETECTED
S MARCESCENS DNA BLD POS NAA+NON-PROBE: NOT DETECTED
S PNEUM DNA BLD POS QL NAA+NON-PROBE: NOT DETECTED
S PYO DNA BLD POS QL NAA+NON-PROBE: NOT DETECTED
SALMONELLA DNA BLD POS QL NAA+NON-PROBE: NOT DETECTED
STREPTOCOCCUS DNA BLD POS NAA+NON-PROBE: NOT DETECTED
VANA+VANB ISLT/SPM QL: NOT DETECTED

## 2025-06-02 PROCEDURE — 96366 THER/PROPH/DIAG IV INF ADDON: CPT

## 2025-06-02 NOTE — ED NOTES
23:29 Assumed care of pt. White board updated. Plan of care discussed. Call bell in reach. Will continue to monitor.

## 2025-06-03 ENCOUNTER — RESULTS FOLLOW-UP (OUTPATIENT)
Facility: HOSPITAL | Age: 75
End: 2025-06-03

## 2025-06-03 NOTE — RESULT ENCOUNTER NOTE
6/2/2025   I was able to speak with the patient's daughter and the patient over the phone regarding these positive blood culture results.  We discussed the recommended course of returning to the hospital for reassessment and redraw blood cultures.  They report that he feels better, has no complaints, they deny fever or any new symptoms.  They report that they have picked up but not started the antibiotics.  I discussed with them they should take these medications as prescribed but it would be recommended that they return to the hospital.  They would like to watch and wait at home.  They report they have been in and out of the hospital over the past week. we discussed that if he develops any new, worsening or no improvement of his symptoms he needs to return immediately to the hospital.   Corey Snider MD

## 2025-06-07 LAB
BACTERIA SPEC CULT: ABNORMAL
BACTERIA SPEC CULT: NORMAL
SERVICE CMNT-IMP: ABNORMAL
SERVICE CMNT-IMP: NORMAL

## 2025-06-25 NOTE — PROGRESS NOTES
Abdomen , soft, nontender, nondistended , no guarding or rigidity , no masses palpable , normal bowel sounds , Liver and Spleen,  no hepatosplenomegaly , liver nontender Problem: Falls - Risk of  Goal: *Absence of Falls  Description  Document Breanne Schneider Fall Risk and appropriate interventions in the flowsheet.   Outcome: Progressing Towards Goal     Problem: Patient Education: Go to Patient Education Activity  Goal: Patient/Family Education  Outcome: Progressing Towards Goal

## (undated) DEVICE — STERILE POLYISOPRENE POWDER-FREE SURGICAL GLOVES: Brand: PROTEXIS

## (undated) DEVICE — STOCKINETTE: Brand: DEROYAL

## (undated) DEVICE — SWAB CULT LIQ STUART AGR AERB MOD IN BRK SGL RAYON TIP PLAS 220099] BECTON DICKINSON MICRO]

## (undated) DEVICE — ZIMMER® STERILE DISPOSABLE TOURNIQUET CUFF WITH PROTECTIVE SLEEVE AND PLC, DUAL PORT, SINGLE BLADDER, 18 IN. (46 CM)

## (undated) DEVICE — GOWN,SIRUS,NONRNF,SETINSLV,XL,20/CS: Brand: MEDLINE

## (undated) DEVICE — CANISTER WND VAC ASST CLSR --

## (undated) DEVICE — DRAPE SHT 3 QTR PROXIMA 53X77 --

## (undated) DEVICE — PACK,BASIC,SIRUS,V: Brand: MEDLINE

## (undated) DEVICE — DRSG VAC ASST CLSR GRNUFM MED -- 18X12.5X3.2CM

## (undated) DEVICE — PRECISION THIN (9.0 X 0.38 X 31.0MM)

## (undated) DEVICE — NEEDLE HYPO 25GA L1.5IN BVL ORIENTED ECLIPSE

## (undated) DEVICE — ZIMMER® STERILE DISPOSABLE TOURNIQUET CUFF WITH PROTECTIVE SLEEVE AND PLC, DUAL PORT, SINGLE BLADDER, 34 IN. (86 CM)

## (undated) DEVICE — CURITY NON-ADHERENT STRIPS: Brand: CURITY

## (undated) DEVICE — SPONGE GZ W4XL4IN COT 12 PLY TYP VII WVN C FLD DSGN

## (undated) DEVICE — SUTURE NONABSORBABLE MONOFILAMENT 5-0 PS-2 18 IN BLK ETHILON 1666H

## (undated) DEVICE — SUTURE VCRL SZ 4-0 L27IN ABSRB UD L19MM FS-2 3/8 CIR REV J422H

## (undated) DEVICE — PADDING CST 6IN STERILE --

## (undated) DEVICE — DRAPE,EXTREMITY,89X128,STERILE: Brand: MEDLINE

## (undated) DEVICE — STRAP,POSITIONING,KNEE/BODY,FOAM,4X60": Brand: MEDLINE

## (undated) DEVICE — HANDPIECE SET WITH COAXIAL HIGH FLOW TIP AND SUCTION TUBE: Brand: INTERPULSE

## (undated) DEVICE — CULTURETTE SGL EVAC TUBE PALL -- 100/CA

## (undated) DEVICE — SYR 10ML LUER LOK 1/5ML GRAD --

## (undated) DEVICE — SUT SLK 2-0SH 30IN BLK --

## (undated) DEVICE — HANDLE LT SNAP ON ULT DURABLE LENS FOR TRUMPF ALC DISPOSABLE

## (undated) DEVICE — BULB SYRINGE, IRRIGATION WITH PROTECTIVE CAP, 60 CC, INDIVIDUALLY WRAPPED: Brand: DOVER

## (undated) DEVICE — INFECTION CONTROL KIT SYS

## (undated) DEVICE — TRAY PREP DRY W/ PREM GLV 2 APPL 6 SPNG 2 UNDPD 1 OVERWRAP

## (undated) DEVICE — KENDALL SCD EXPRESS SLEEVES, KNEE LENGTH, MEDIUM: Brand: KENDALL SCD

## (undated) DEVICE — SURGIFOAM SPNG SZ 100

## (undated) DEVICE — SOL IRRIGATION INJ NACL 0.9% 500ML BTL

## (undated) DEVICE — BANDAGE COMPR SELF ADH 5 YDX4 IN TAN STRL PREMIERPRO LF

## (undated) DEVICE — GAUZE,PACKING STRIP,PLAIN,1"X5YD,STRL,LF: Brand: CURAD

## (undated) DEVICE — REM POLYHESIVE ADULT PATIENT RETURN ELECTRODE: Brand: VALLEYLAB

## (undated) DEVICE — PAD,ABDOMINAL,5"X9",ST,LF,25/BX: Brand: MEDLINE INDUSTRIES, INC.

## (undated) DEVICE — CAP PROTCT PIN 0.045INX0.89MM --

## (undated) DEVICE — NEEDLE HYPO 18GA L1.5IN PNK S STL HUB POLYPR SHLD REG BVL

## (undated) DEVICE — Device